# Patient Record
Sex: MALE | Race: WHITE | NOT HISPANIC OR LATINO | Employment: FULL TIME | URBAN - NONMETROPOLITAN AREA
[De-identification: names, ages, dates, MRNs, and addresses within clinical notes are randomized per-mention and may not be internally consistent; named-entity substitution may affect disease eponyms.]

---

## 2019-12-12 RX ORDER — NAPROXEN SODIUM 220 MG
220 TABLET ORAL DAILY
COMMUNITY
End: 2020-09-11 | Stop reason: HOSPADM

## 2019-12-12 RX ORDER — IRBESARTAN AND HYDROCHLOROTHIAZIDE 300; 12.5 MG/1; MG/1
1 TABLET, FILM COATED ORAL DAILY
COMMUNITY
End: 2020-09-11 | Stop reason: HOSPADM

## 2019-12-12 RX ORDER — AMLODIPINE BESYLATE 10 MG/1
10 TABLET ORAL NIGHTLY
COMMUNITY
End: 2020-09-11 | Stop reason: HOSPADM

## 2019-12-20 ENCOUNTER — HOSPITAL ENCOUNTER (OUTPATIENT)
Facility: HOSPITAL | Age: 59
Setting detail: HOSPITAL OUTPATIENT SURGERY
Discharge: HOME | End: 2019-12-20
Attending: INTERNAL MEDICINE | Admitting: INTERNAL MEDICINE

## 2019-12-20 ENCOUNTER — ANESTHESIA (OUTPATIENT)
Dept: GASTROENTEROLOGY | Facility: HOSPITAL | Age: 59
End: 2019-12-20

## 2019-12-20 ENCOUNTER — ANESTHESIA EVENT (OUTPATIENT)
Dept: GASTROENTEROLOGY | Facility: HOSPITAL | Age: 59
End: 2019-12-20

## 2019-12-20 VITALS
WEIGHT: 214 LBS | SYSTOLIC BLOOD PRESSURE: 126 MMHG | HEIGHT: 72 IN | OXYGEN SATURATION: 96 % | DIASTOLIC BLOOD PRESSURE: 78 MMHG | BODY MASS INDEX: 28.99 KG/M2 | TEMPERATURE: 97.8 F | HEART RATE: 91 BPM | RESPIRATION RATE: 20 BRPM

## 2019-12-20 DIAGNOSIS — R19.5 POSITIVE OCCULT STOOL BLOOD TEST: ICD-10-CM

## 2019-12-20 DIAGNOSIS — Z12.11 SCREEN FOR COLON CANCER: ICD-10-CM

## 2019-12-20 PROCEDURE — 25010000002 PROPOFOL 10 MG/ML EMULSION: Performed by: NURSE ANESTHETIST, CERTIFIED REGISTERED

## 2019-12-20 PROCEDURE — 88305 TISSUE EXAM BY PATHOLOGIST: CPT | Performed by: PATHOLOGY

## 2019-12-20 PROCEDURE — 88305 TISSUE EXAM BY PATHOLOGIST: CPT | Performed by: INTERNAL MEDICINE

## 2019-12-20 RX ORDER — PROPOFOL 10 MG/ML
VIAL (ML) INTRAVENOUS AS NEEDED
Status: DISCONTINUED | OUTPATIENT
Start: 2019-12-20 | End: 2019-12-20 | Stop reason: SURG

## 2019-12-20 RX ORDER — LIDOCAINE HYDROCHLORIDE 20 MG/ML
INJECTION, SOLUTION INTRAVENOUS AS NEEDED
Status: DISCONTINUED | OUTPATIENT
Start: 2019-12-20 | End: 2019-12-20 | Stop reason: SURG

## 2019-12-20 RX ORDER — DEXTROSE AND SODIUM CHLORIDE 5; .45 G/100ML; G/100ML
30 INJECTION, SOLUTION INTRAVENOUS CONTINUOUS PRN
Status: DISCONTINUED | OUTPATIENT
Start: 2019-12-20 | End: 2019-12-20 | Stop reason: HOSPADM

## 2019-12-20 RX ADMIN — PROPOFOL 20 MG: 10 INJECTION, EMULSION INTRAVENOUS at 14:28

## 2019-12-20 RX ADMIN — PROPOFOL 70 MG: 10 INJECTION, EMULSION INTRAVENOUS at 14:24

## 2019-12-20 RX ADMIN — DEXTROSE AND SODIUM CHLORIDE 30 ML/HR: 5; 450 INJECTION, SOLUTION INTRAVENOUS at 13:44

## 2019-12-20 RX ADMIN — PROPOFOL 20 MG: 10 INJECTION, EMULSION INTRAVENOUS at 14:29

## 2019-12-20 RX ADMIN — PROPOFOL 40 MG: 10 INJECTION, EMULSION INTRAVENOUS at 14:33

## 2019-12-20 RX ADMIN — PROPOFOL 20 MG: 10 INJECTION, EMULSION INTRAVENOUS at 14:38

## 2019-12-20 RX ADMIN — PROPOFOL 50 MG: 10 INJECTION, EMULSION INTRAVENOUS at 14:25

## 2019-12-20 RX ADMIN — LIDOCAINE HYDROCHLORIDE 60 MG: 20 INJECTION, SOLUTION INTRAVENOUS at 14:32

## 2019-12-20 RX ADMIN — PROPOFOL 20 MG: 10 INJECTION, EMULSION INTRAVENOUS at 14:27

## 2019-12-20 RX ADMIN — PROPOFOL 20 MG: 10 INJECTION, EMULSION INTRAVENOUS at 14:31

## 2019-12-20 RX ADMIN — PROPOFOL 20 MG: 10 INJECTION, EMULSION INTRAVENOUS at 14:32

## 2019-12-20 RX ADMIN — PROPOFOL 20 MG: 10 INJECTION, EMULSION INTRAVENOUS at 14:30

## 2019-12-20 RX ADMIN — PROPOFOL 20 MG: 10 INJECTION, EMULSION INTRAVENOUS at 14:35

## 2019-12-20 NOTE — ANESTHESIA POSTPROCEDURE EVALUATION
Patient: Mark Sharma    Procedure Summary     Date:  12/20/19 Room / Location:  [facility] ENDOSCOPY 2 / [facility] ENDOSCOPY    Anesthesia Start:  1419 Anesthesia Stop:  1445    Procedure:  COLONOSCOPY (N/A ) Diagnosis:       Screen for colon cancer      Positive occult stool blood test      (Screen for colon cancer [Z12.11])      (Positive occult stool blood test [R19.5])    Surgeon:  Dion Ambrocio DO Provider:  Collin Carter CRNA    Anesthesia Type:  MAC ASA Status:  2          Anesthesia Type: MAC    Vitals  No vitals data found for the desired time range.          Post Anesthesia Care and Evaluation    Patient location during evaluation: bedside  Patient participation: complete - patient participated  Level of consciousness: awake  Pain score: 0  Pain management: adequate  Airway patency: patent  Anesthetic complications: No anesthetic complications  PONV Status: none  Cardiovascular status: acceptable  Respiratory status: acceptable and spontaneous ventilation  Hydration status: acceptable

## 2019-12-20 NOTE — ANESTHESIA PREPROCEDURE EVALUATION
Anesthesia Evaluation     Patient summary reviewed   NPO Solid Status: > 8 hours  NPO Liquid Status: > 4 hours           Airway   Mallampati: II  TM distance: >3 FB  Neck ROM: full  No difficulty expected  Dental      Pulmonary - normal exam   Cardiovascular - normal exam    (+) hypertension,       Neuro/Psych  GI/Hepatic/Renal/Endo      Musculoskeletal     Abdominal    Substance History      OB/GYN          Other                        Anesthesia Plan    ASA 2     MAC     intravenous induction     Anesthetic plan, all risks, benefits, and alternatives have been provided, discussed and informed consent has been obtained with: patient.

## 2019-12-23 LAB
LAB AP CASE REPORT: NORMAL
PATH REPORT.FINAL DX SPEC: NORMAL
PATH REPORT.GROSS SPEC: NORMAL

## 2020-09-05 ENCOUNTER — APPOINTMENT (OUTPATIENT)
Dept: CARDIOLOGY | Facility: HOSPITAL | Age: 60
End: 2020-09-05

## 2020-09-05 ENCOUNTER — HOSPITAL ENCOUNTER (INPATIENT)
Facility: HOSPITAL | Age: 60
LOS: 6 days | Discharge: LEFT BEFORE DISCHARGE | End: 2020-09-11
Attending: FAMILY MEDICINE | Admitting: HOSPITALIST

## 2020-09-05 ENCOUNTER — APPOINTMENT (OUTPATIENT)
Dept: GENERAL RADIOLOGY | Facility: HOSPITAL | Age: 60
End: 2020-09-05

## 2020-09-05 DIAGNOSIS — J18.9 PNEUMONIA DUE TO ORGANISM: ICD-10-CM

## 2020-09-05 DIAGNOSIS — I35.0 AORTIC STENOSIS, SEVERE: ICD-10-CM

## 2020-09-05 DIAGNOSIS — Z74.09 IMPAIRED FUNCTIONAL MOBILITY, BALANCE, GAIT, AND ENDURANCE: ICD-10-CM

## 2020-09-05 DIAGNOSIS — J96.01 ACUTE RESPIRATORY FAILURE WITH HYPOXIA (HCC): Primary | ICD-10-CM

## 2020-09-05 DIAGNOSIS — Z78.9 IMPAIRED MOBILITY AND ADLS: ICD-10-CM

## 2020-09-05 DIAGNOSIS — Z74.09 IMPAIRED MOBILITY AND ADLS: ICD-10-CM

## 2020-09-05 DIAGNOSIS — J44.1 COPD EXACERBATION (HCC): ICD-10-CM

## 2020-09-05 DIAGNOSIS — R13.10 DYSPHAGIA, UNSPECIFIED TYPE: ICD-10-CM

## 2020-09-05 LAB
ALBUMIN SERPL-MCNC: 4.2 G/DL (ref 3.5–5.2)
ALBUMIN/GLOB SERPL: 1.8 G/DL
ALP SERPL-CCNC: 81 U/L (ref 39–117)
ALT SERPL W P-5'-P-CCNC: 25 U/L (ref 1–41)
ANION GAP SERPL CALCULATED.3IONS-SCNC: 12 MMOL/L (ref 5–15)
ARTERIAL PATENCY WRIST A: ABNORMAL
ARTERIAL PATENCY WRIST A: POSITIVE
AST SERPL-CCNC: 26 U/L (ref 1–40)
ATMOSPHERIC PRESS: 754 MMHG
ATMOSPHERIC PRESS: 755 MMHG
BACTERIA UR QL AUTO: ABNORMAL /HPF
BASE EXCESS BLDA CALC-SCNC: -6.7 MMOL/L (ref 0–2)
BASE EXCESS BLDA CALC-SCNC: -7.7 MMOL/L (ref 0–2)
BASOPHILS # BLD AUTO: 0.08 10*3/MM3 (ref 0–0.2)
BASOPHILS NFR BLD AUTO: 0.7 % (ref 0–1.5)
BDY SITE: ABNORMAL
BDY SITE: ABNORMAL
BH CV ECHO MEAS - AI DEC SLOPE: 116 CM/SEC^2
BH CV ECHO MEAS - AI MAX PG: 36.2 MMHG
BH CV ECHO MEAS - AI MAX VEL: 301 CM/SEC
BH CV ECHO MEAS - AI P1/2T: 760 MSEC
BH CV ECHO MEAS - AO MAX PG (FULL): 63.4 MMHG
BH CV ECHO MEAS - AO MAX PG: 66.3 MMHG
BH CV ECHO MEAS - AO MEAN PG (FULL): 39 MMHG
BH CV ECHO MEAS - AO MEAN PG: 41 MMHG
BH CV ECHO MEAS - AO V2 MAX: 407 CM/SEC
BH CV ECHO MEAS - AO V2 MEAN: 303 CM/SEC
BH CV ECHO MEAS - AO V2 VTI: 91.4 CM
BH CV ECHO MEAS - AVA(I,A): 0.74 CM^2
BH CV ECHO MEAS - AVA(I,D): 0.74 CM^2
BH CV ECHO MEAS - AVA(V,A): 0.72 CM^2
BH CV ECHO MEAS - AVA(V,D): 0.72 CM^2
BH CV ECHO MEAS - BSA(HAYCOCK): 2.2 M^2
BH CV ECHO MEAS - BSA: 2.1 M^2
BH CV ECHO MEAS - BZI_BMI: 27.3 KILOGRAMS/M^2
BH CV ECHO MEAS - BZI_METRIC_HEIGHT: 182.9 CM
BH CV ECHO MEAS - BZI_METRIC_WEIGHT: 91.2 KG
BH CV ECHO MEAS - EDV(CUBED): 142.2 ML
BH CV ECHO MEAS - EDV(MOD-SP2): 53.4 ML
BH CV ECHO MEAS - EDV(MOD-SP4): 112 ML
BH CV ECHO MEAS - EDV(TEICH): 130.7 ML
BH CV ECHO MEAS - EF(CUBED): 45.6 %
BH CV ECHO MEAS - EF(MOD-SP2): 22.3 %
BH CV ECHO MEAS - EF(MOD-SP4): 32.8 %
BH CV ECHO MEAS - EF(TEICH): 37.8 %
BH CV ECHO MEAS - ESV(CUBED): 77.3 ML
BH CV ECHO MEAS - ESV(MOD-SP2): 41.5 ML
BH CV ECHO MEAS - ESV(MOD-SP4): 75.3 ML
BH CV ECHO MEAS - ESV(TEICH): 81.3 ML
BH CV ECHO MEAS - FS: 18.4 %
BH CV ECHO MEAS - IVS/LVPW: 0.84
BH CV ECHO MEAS - IVSD: 1.2 CM
BH CV ECHO MEAS - LA DIMENSION: 3.2 CM
BH CV ECHO MEAS - LV DIASTOLIC VOL/BSA (35-75): 52.5 ML/M^2
BH CV ECHO MEAS - LV MASS(C)D: 287.8 GRAMS
BH CV ECHO MEAS - LV MASS(C)DI: 134.8 GRAMS/M^2
BH CV ECHO MEAS - LV MAX PG: 2.9 MMHG
BH CV ECHO MEAS - LV MEAN PG: 2 MMHG
BH CV ECHO MEAS - LV SYSTOLIC VOL/BSA (12-30): 35.3 ML/M^2
BH CV ECHO MEAS - LV V1 MAX: 84.9 CM/SEC
BH CV ECHO MEAS - LV V1 MEAN: 56.5 CM/SEC
BH CV ECHO MEAS - LV V1 VTI: 19.6 CM
BH CV ECHO MEAS - LVIDD: 5.2 CM
BH CV ECHO MEAS - LVIDS: 4.3 CM
BH CV ECHO MEAS - LVLD AP2: 8.2 CM
BH CV ECHO MEAS - LVLD AP4: 9.1 CM
BH CV ECHO MEAS - LVLS AP2: 8.4 CM
BH CV ECHO MEAS - LVLS AP4: 9.2 CM
BH CV ECHO MEAS - LVOT AREA (M): 3.5 CM^2
BH CV ECHO MEAS - LVOT AREA: 3.5 CM^2
BH CV ECHO MEAS - LVOT DIAM: 2.1 CM
BH CV ECHO MEAS - LVPWD: 1.4 CM
BH CV ECHO MEAS - MV A MAX VEL: 115 CM/SEC
BH CV ECHO MEAS - MV DEC SLOPE: 530 CM/SEC^2
BH CV ECHO MEAS - MV E MAX VEL: 77.6 CM/SEC
BH CV ECHO MEAS - MV E/A: 0.67
BH CV ECHO MEAS - MV P1/2T MAX VEL: 88 CM/SEC
BH CV ECHO MEAS - MV P1/2T: 48.6 MSEC
BH CV ECHO MEAS - MVA P1/2T LCG: 2.5 CM^2
BH CV ECHO MEAS - MVA(P1/2T): 4.5 CM^2
BH CV ECHO MEAS - PA MAX PG (FULL): 0.56 MMHG
BH CV ECHO MEAS - PA MAX PG: 3.3 MMHG
BH CV ECHO MEAS - PA V2 MAX: 90.9 CM/SEC
BH CV ECHO MEAS - RAP SYSTOLE: 10 MMHG
BH CV ECHO MEAS - RV MAX PG: 2.7 MMHG
BH CV ECHO MEAS - RV MEAN PG: 2 MMHG
BH CV ECHO MEAS - RV V1 MAX: 82.9 CM/SEC
BH CV ECHO MEAS - RV V1 MEAN: 60 CM/SEC
BH CV ECHO MEAS - RV V1 VTI: 17 CM
BH CV ECHO MEAS - RVDD: 2.5 CM
BH CV ECHO MEAS - RVSP: 37.2 MMHG
BH CV ECHO MEAS - SI(CUBED): 30.4 ML/M^2
BH CV ECHO MEAS - SI(LVOT): 31.8 ML/M^2
BH CV ECHO MEAS - SI(MOD-SP2): 5.6 ML/M^2
BH CV ECHO MEAS - SI(MOD-SP4): 17.2 ML/M^2
BH CV ECHO MEAS - SI(TEICH): 23.1 ML/M^2
BH CV ECHO MEAS - SV(CUBED): 64.9 ML
BH CV ECHO MEAS - SV(LVOT): 67.9 ML
BH CV ECHO MEAS - SV(MOD-SP2): 11.9 ML
BH CV ECHO MEAS - SV(MOD-SP4): 36.7 ML
BH CV ECHO MEAS - SV(TEICH): 49.4 ML
BH CV ECHO MEAS - TR MAX VEL: 261 CM/SEC
BILIRUB SERPL-MCNC: 1.5 MG/DL (ref 0–1.2)
BILIRUB UR QL STRIP: NEGATIVE
BUN SERPL-MCNC: 12 MG/DL (ref 6–20)
BUN/CREAT SERPL: 11.3 (ref 7–25)
CALCIUM SPEC-SCNC: 8.6 MG/DL (ref 8.6–10.5)
CHLORIDE SERPL-SCNC: 103 MMOL/L (ref 98–107)
CLARITY UR: CLEAR
CO2 SERPL-SCNC: 25 MMOL/L (ref 22–29)
COLOR UR: YELLOW
CREAT SERPL-MCNC: 1.06 MG/DL (ref 0.76–1.27)
D-LACTATE SERPL-SCNC: 1 MMOL/L (ref 0.5–2)
D-LACTATE SERPL-SCNC: 2.2 MMOL/L (ref 0.5–2)
DEPRECATED RDW RBC AUTO: 43.8 FL (ref 37–54)
EOSINOPHIL # BLD AUTO: 0.19 10*3/MM3 (ref 0–0.4)
EOSINOPHIL NFR BLD AUTO: 1.5 % (ref 0.3–6.2)
ERYTHROCYTE [DISTWIDTH] IN BLOOD BY AUTOMATED COUNT: 12.4 % (ref 12.3–15.4)
GAS FLOW AIRWAY: 15 LPM
GFR SERPL CREATININE-BSD FRML MDRD: 72 ML/MIN/1.73
GLOBULIN UR ELPH-MCNC: 2.4 GM/DL
GLUCOSE SERPL-MCNC: 191 MG/DL (ref 65–99)
GLUCOSE UR STRIP-MCNC: NEGATIVE MG/DL
HBA1C MFR BLD: 5.3 % (ref 4.8–5.6)
HCO3 BLDA-SCNC: 21 MMOL/L (ref 20–26)
HCO3 BLDA-SCNC: 21.2 MMOL/L (ref 20–26)
HCT VFR BLD AUTO: 45.5 % (ref 37.5–51)
HGB BLD-MCNC: 16 G/DL (ref 13–17.7)
HGB UR QL STRIP.AUTO: ABNORMAL
HOLD SPECIMEN: NORMAL
HOLD SPECIMEN: NORMAL
HYALINE CASTS UR QL AUTO: ABNORMAL /LPF
IMM GRANULOCYTES # BLD AUTO: 0.04 10*3/MM3 (ref 0–0.05)
IMM GRANULOCYTES NFR BLD AUTO: 0.3 % (ref 0–0.5)
INHALED O2 CONCENTRATION: 100 %
INHALED O2 CONCENTRATION: 100 %
KETONES UR QL STRIP: ABNORMAL
LACTATE HOLD SPECIMEN: NORMAL
LEUKOCYTE ESTERASE UR QL STRIP.AUTO: NEGATIVE
LYMPHOCYTES # BLD AUTO: 3.26 10*3/MM3 (ref 0.7–3.1)
LYMPHOCYTES NFR BLD AUTO: 26.5 % (ref 19.6–45.3)
Lab: ABNORMAL
Lab: ABNORMAL
MAXIMAL PREDICTED HEART RATE: 161 BPM
MCH RBC QN AUTO: 33.4 PG (ref 26.6–33)
MCHC RBC AUTO-ENTMCNC: 35.2 G/DL (ref 31.5–35.7)
MCV RBC AUTO: 95 FL (ref 79–97)
MODALITY: ABNORMAL
MODALITY: ABNORMAL
MONOCYTES # BLD AUTO: 0.82 10*3/MM3 (ref 0.1–0.9)
MONOCYTES NFR BLD AUTO: 6.7 % (ref 5–12)
NEUTROPHILS NFR BLD AUTO: 64.3 % (ref 42.7–76)
NEUTROPHILS NFR BLD AUTO: 7.9 10*3/MM3 (ref 1.7–7)
NITRITE UR QL STRIP: NEGATIVE
NRBC BLD AUTO-RTO: 0 /100 WBC (ref 0–0.2)
NT-PROBNP SERPL-MCNC: 3740 PG/ML (ref 0–900)
PCO2 BLDA: 50.6 MM HG (ref 35–45)
PCO2 BLDA: 53.7 MM HG (ref 35–45)
PEEP RESPIRATORY: 5 CM[H2O]
PH BLDA: 7.2 PH UNITS (ref 7.35–7.45)
PH BLDA: 7.23 PH UNITS (ref 7.35–7.45)
PH UR STRIP.AUTO: 5.5 [PH] (ref 5–9)
PLATELET # BLD AUTO: 256 10*3/MM3 (ref 140–450)
PMV BLD AUTO: 11.6 FL (ref 6–12)
PO2 BLDA: 64.6 MM HG (ref 83–108)
PO2 BLDA: 95.1 MM HG (ref 83–108)
POTASSIUM SERPL-SCNC: 3.4 MMOL/L (ref 3.5–5.2)
PROT SERPL-MCNC: 6.6 G/DL (ref 6–8.5)
PROT UR QL STRIP: NEGATIVE
RBC # BLD AUTO: 4.79 10*6/MM3 (ref 4.14–5.8)
RBC # UR: ABNORMAL /HPF
REF LAB TEST METHOD: ABNORMAL
SAO2 % BLDCOA: 86.6 % (ref 94–99)
SAO2 % BLDCOA: 95.9 % (ref 94–99)
SARS-COV-2 N GENE RESP QL NAA+PROBE: NOT DETECTED
SET MECH RESP RATE: 20
SODIUM SERPL-SCNC: 140 MMOL/L (ref 136–145)
SP GR UR STRIP: 1.02 (ref 1–1.03)
SQUAMOUS #/AREA URNS HPF: ABNORMAL /HPF
STRESS TARGET HR: 137 BPM
TROPONIN T SERPL-MCNC: 0.12 NG/ML (ref 0–0.03)
TROPONIN T SERPL-MCNC: 0.22 NG/ML (ref 0–0.03)
TROPONIN T SERPL-MCNC: <0.01 NG/ML (ref 0–0.03)
UROBILINOGEN UR QL STRIP: ABNORMAL
VENTILATOR MODE: ABNORMAL
VENTILATOR MODE: AC
VT ON VENT VENT: 550 ML
WBC # BLD AUTO: 12.29 10*3/MM3 (ref 3.4–10.8)
WBC UR QL AUTO: ABNORMAL /HPF
WHOLE BLOOD HOLD SPECIMEN: NORMAL

## 2020-09-05 PROCEDURE — 94799 UNLISTED PULMONARY SVC/PX: CPT

## 2020-09-05 PROCEDURE — 25010000002 FUROSEMIDE PER 20 MG: Performed by: FAMILY MEDICINE

## 2020-09-05 PROCEDURE — 0BH17EZ INSERTION OF ENDOTRACHEAL AIRWAY INTO TRACHEA, VIA NATURAL OR ARTIFICIAL OPENING: ICD-10-PCS | Performed by: FAMILY MEDICINE

## 2020-09-05 PROCEDURE — 25010000002 CEFTRIAXONE: Performed by: FAMILY MEDICINE

## 2020-09-05 PROCEDURE — 94002 VENT MGMT INPAT INIT DAY: CPT

## 2020-09-05 PROCEDURE — 25010000002 METHYLPREDNISOLONE PER 125 MG: Performed by: FAMILY MEDICINE

## 2020-09-05 PROCEDURE — 25010000002 FUROSEMIDE PER 20 MG: Performed by: HOSPITALIST

## 2020-09-05 PROCEDURE — 25010000002 MIDAZOLAM PER 1 MG: Performed by: FAMILY MEDICINE

## 2020-09-05 PROCEDURE — 5A1945Z RESPIRATORY VENTILATION, 24-96 CONSECUTIVE HOURS: ICD-10-PCS | Performed by: INTERNAL MEDICINE

## 2020-09-05 PROCEDURE — 87635 SARS-COV-2 COVID-19 AMP PRB: CPT | Performed by: FAMILY MEDICINE

## 2020-09-05 PROCEDURE — 93306 TTE W/DOPPLER COMPLETE: CPT

## 2020-09-05 PROCEDURE — 87040 BLOOD CULTURE FOR BACTERIA: CPT | Performed by: FAMILY MEDICINE

## 2020-09-05 PROCEDURE — 84484 ASSAY OF TROPONIN QUANT: CPT | Performed by: INTERNAL MEDICINE

## 2020-09-05 PROCEDURE — 25010000002 MIDAZOLAM PER 1 MG

## 2020-09-05 PROCEDURE — 31500 INSERT EMERGENCY AIRWAY: CPT

## 2020-09-05 PROCEDURE — 93010 ELECTROCARDIOGRAM REPORT: CPT | Performed by: INTERNAL MEDICINE

## 2020-09-05 PROCEDURE — 87070 CULTURE OTHR SPECIMN AEROBIC: CPT | Performed by: HOSPITALIST

## 2020-09-05 PROCEDURE — 94760 N-INVAS EAR/PLS OXIMETRY 1: CPT

## 2020-09-05 PROCEDURE — 81001 URINALYSIS AUTO W/SCOPE: CPT | Performed by: HOSPITALIST

## 2020-09-05 PROCEDURE — 25010000002 ENOXAPARIN PER 10 MG: Performed by: INTERNAL MEDICINE

## 2020-09-05 PROCEDURE — 83605 ASSAY OF LACTIC ACID: CPT | Performed by: FAMILY MEDICINE

## 2020-09-05 PROCEDURE — 71045 X-RAY EXAM CHEST 1 VIEW: CPT

## 2020-09-05 PROCEDURE — 82803 BLOOD GASES ANY COMBINATION: CPT

## 2020-09-05 PROCEDURE — 25010000002 MIDAZOLAM 50 MG/10ML SOLUTION: Performed by: INTERNAL MEDICINE

## 2020-09-05 PROCEDURE — 25010000002 ONDANSETRON PER 1 MG

## 2020-09-05 PROCEDURE — 93306 TTE W/DOPPLER COMPLETE: CPT | Performed by: INTERNAL MEDICINE

## 2020-09-05 PROCEDURE — 93005 ELECTROCARDIOGRAM TRACING: CPT | Performed by: FAMILY MEDICINE

## 2020-09-05 PROCEDURE — 85025 COMPLETE CBC W/AUTO DIFF WBC: CPT | Performed by: FAMILY MEDICINE

## 2020-09-05 PROCEDURE — 84484 ASSAY OF TROPONIN QUANT: CPT | Performed by: FAMILY MEDICINE

## 2020-09-05 PROCEDURE — C9803 HOPD COVID-19 SPEC COLLECT: HCPCS

## 2020-09-05 PROCEDURE — 36600 WITHDRAWAL OF ARTERIAL BLOOD: CPT

## 2020-09-05 PROCEDURE — 25010000002 PROPOFOL 10 MG/ML EMULSION: Performed by: FAMILY MEDICINE

## 2020-09-05 PROCEDURE — 87205 SMEAR GRAM STAIN: CPT | Performed by: HOSPITALIST

## 2020-09-05 PROCEDURE — 83036 HEMOGLOBIN GLYCOSYLATED A1C: CPT | Performed by: INTERNAL MEDICINE

## 2020-09-05 PROCEDURE — 80053 COMPREHEN METABOLIC PANEL: CPT | Performed by: FAMILY MEDICINE

## 2020-09-05 PROCEDURE — 99291 CRITICAL CARE FIRST HOUR: CPT

## 2020-09-05 PROCEDURE — 94640 AIRWAY INHALATION TREATMENT: CPT

## 2020-09-05 PROCEDURE — 84484 ASSAY OF TROPONIN QUANT: CPT | Performed by: HOSPITALIST

## 2020-09-05 PROCEDURE — 83880 ASSAY OF NATRIURETIC PEPTIDE: CPT | Performed by: FAMILY MEDICINE

## 2020-09-05 PROCEDURE — 25010000002 SUCCINYLCHOLINE PER 20 MG: Performed by: FAMILY MEDICINE

## 2020-09-05 PROCEDURE — 25010000002 AZITHROMYCIN 500 MG/250 ML: Performed by: FAMILY MEDICINE

## 2020-09-05 RX ORDER — ALBUTEROL SULFATE 90 UG/1
2 AEROSOL, METERED RESPIRATORY (INHALATION)
Status: DISCONTINUED | OUTPATIENT
Start: 2020-09-05 | End: 2020-09-08

## 2020-09-05 RX ORDER — FUROSEMIDE 10 MG/ML
40 INJECTION INTRAMUSCULAR; INTRAVENOUS ONCE
Status: COMPLETED | OUTPATIENT
Start: 2020-09-05 | End: 2020-09-05

## 2020-09-05 RX ORDER — ONDANSETRON 2 MG/ML
INJECTION INTRAMUSCULAR; INTRAVENOUS
Status: COMPLETED
Start: 2020-09-05 | End: 2020-09-05

## 2020-09-05 RX ORDER — SODIUM CHLORIDE, SODIUM LACTATE, POTASSIUM CHLORIDE, CALCIUM CHLORIDE 600; 310; 30; 20 MG/100ML; MG/100ML; MG/100ML; MG/100ML
30 INJECTION, SOLUTION INTRAVENOUS CONTINUOUS
Status: DISCONTINUED | OUTPATIENT
Start: 2020-09-05 | End: 2020-09-09

## 2020-09-05 RX ORDER — ALBUTEROL SULFATE 2.5 MG/3ML
7.5 SOLUTION RESPIRATORY (INHALATION) CONTINUOUS
Status: DISCONTINUED | OUTPATIENT
Start: 2020-09-05 | End: 2020-09-05

## 2020-09-05 RX ORDER — SODIUM CHLORIDE 0.9 % (FLUSH) 0.9 %
10 SYRINGE (ML) INJECTION AS NEEDED
Status: DISCONTINUED | OUTPATIENT
Start: 2020-09-05 | End: 2020-09-11 | Stop reason: HOSPADM

## 2020-09-05 RX ORDER — IPRATROPIUM BROMIDE AND ALBUTEROL SULFATE 2.5; .5 MG/3ML; MG/3ML
SOLUTION RESPIRATORY (INHALATION)
Status: COMPLETED
Start: 2020-09-05 | End: 2020-09-05

## 2020-09-05 RX ORDER — PANTOPRAZOLE SODIUM 40 MG/1
40 TABLET, DELAYED RELEASE ORAL DAILY
COMMUNITY

## 2020-09-05 RX ORDER — FAMOTIDINE 10 MG/ML
20 INJECTION, SOLUTION INTRAVENOUS EVERY 12 HOURS SCHEDULED
Status: DISCONTINUED | OUTPATIENT
Start: 2020-09-05 | End: 2020-09-11 | Stop reason: HOSPADM

## 2020-09-05 RX ORDER — MIDAZOLAM HYDROCHLORIDE 1 MG/ML
INJECTION INTRAMUSCULAR; INTRAVENOUS
Status: COMPLETED
Start: 2020-09-05 | End: 2020-09-05

## 2020-09-05 RX ORDER — IPRATROPIUM BROMIDE AND ALBUTEROL SULFATE 2.5; .5 MG/3ML; MG/3ML
3 SOLUTION RESPIRATORY (INHALATION) ONCE
Status: DISCONTINUED | OUTPATIENT
Start: 2020-09-05 | End: 2020-09-05

## 2020-09-05 RX ORDER — ONDANSETRON 2 MG/ML
4 INJECTION INTRAMUSCULAR; INTRAVENOUS ONCE
Status: COMPLETED | OUTPATIENT
Start: 2020-09-05 | End: 2020-09-05

## 2020-09-05 RX ORDER — SUCCINYLCHOLINE CHLORIDE 20 MG/ML
100 INJECTION INTRAMUSCULAR; INTRAVENOUS ONCE
Status: COMPLETED | OUTPATIENT
Start: 2020-09-05 | End: 2020-09-05

## 2020-09-05 RX ORDER — METHYLPREDNISOLONE SODIUM SUCCINATE 125 MG/2ML
125 INJECTION, POWDER, LYOPHILIZED, FOR SOLUTION INTRAMUSCULAR; INTRAVENOUS ONCE
Status: COMPLETED | OUTPATIENT
Start: 2020-09-05 | End: 2020-09-05

## 2020-09-05 RX ORDER — NOREPINEPHRINE BIT/0.9 % NACL 8 MG/250ML
.02-.3 INFUSION BOTTLE (ML) INTRAVENOUS
Status: DISCONTINUED | OUTPATIENT
Start: 2020-09-05 | End: 2020-09-07

## 2020-09-05 RX ORDER — MIDAZOLAM HYDROCHLORIDE 1 MG/ML
2 INJECTION INTRAMUSCULAR; INTRAVENOUS ONCE
Status: COMPLETED | OUTPATIENT
Start: 2020-09-05 | End: 2020-09-05

## 2020-09-05 RX ORDER — FUROSEMIDE 10 MG/ML
80 INJECTION INTRAMUSCULAR; INTRAVENOUS ONCE
Status: DISCONTINUED | OUTPATIENT
Start: 2020-09-05 | End: 2020-09-06

## 2020-09-05 RX ORDER — IPRATROPIUM BROMIDE AND ALBUTEROL SULFATE 2.5; .5 MG/3ML; MG/3ML
3 SOLUTION RESPIRATORY (INHALATION)
Status: DISCONTINUED | OUTPATIENT
Start: 2020-09-05 | End: 2020-09-05

## 2020-09-05 RX ORDER — SODIUM CHLORIDE 0.9 % (FLUSH) 0.9 %
10 SYRINGE (ML) INJECTION EVERY 12 HOURS SCHEDULED
Status: DISCONTINUED | OUTPATIENT
Start: 2020-09-05 | End: 2020-09-11 | Stop reason: HOSPADM

## 2020-09-05 RX ORDER — ETOMIDATE 2 MG/ML
20 INJECTION INTRAVENOUS ONCE
Status: COMPLETED | OUTPATIENT
Start: 2020-09-05 | End: 2020-09-05

## 2020-09-05 RX ADMIN — ALBUTEROL SULFATE 2 PUFF: 90 AEROSOL, METERED RESPIRATORY (INHALATION) at 18:57

## 2020-09-05 RX ADMIN — MIDAZOLAM HYDROCHLORIDE 7 MG/HR: 1 INJECTION, SOLUTION INTRAMUSCULAR; INTRAVENOUS at 15:00

## 2020-09-05 RX ADMIN — ONDANSETRON 4 MG: 2 INJECTION INTRAMUSCULAR; INTRAVENOUS at 04:50

## 2020-09-05 RX ADMIN — FAMOTIDINE 20 MG: 10 INJECTION INTRAVENOUS at 20:39

## 2020-09-05 RX ADMIN — ETOMIDATE 20 MG: 2 INJECTION, SOLUTION INTRAVENOUS at 04:49

## 2020-09-05 RX ADMIN — MIDAZOLAM HYDROCHLORIDE 2 MG: 2 INJECTION, SOLUTION INTRAMUSCULAR; INTRAVENOUS at 05:52

## 2020-09-05 RX ADMIN — ENOXAPARIN SODIUM 40 MG: 40 INJECTION SUBCUTANEOUS at 09:58

## 2020-09-05 RX ADMIN — MIDAZOLAM HYDROCHLORIDE 2 MG: 1 INJECTION INTRAMUSCULAR; INTRAVENOUS at 07:28

## 2020-09-05 RX ADMIN — AZITHROMYCIN 500 MG: 500 INJECTION, POWDER, LYOPHILIZED, FOR SOLUTION INTRAVENOUS at 06:20

## 2020-09-05 RX ADMIN — SODIUM CHLORIDE, PRESERVATIVE FREE 10 ML: 5 INJECTION INTRAVENOUS at 22:06

## 2020-09-05 RX ADMIN — IPRATROPIUM BROMIDE 2 PUFF: 17 AEROSOL, METERED RESPIRATORY (INHALATION) at 18:57

## 2020-09-05 RX ADMIN — MIDAZOLAM HYDROCHLORIDE 2 MG: 2 INJECTION, SOLUTION INTRAMUSCULAR; INTRAVENOUS at 07:28

## 2020-09-05 RX ADMIN — SODIUM CHLORIDE, POTASSIUM CHLORIDE, SODIUM LACTATE AND CALCIUM CHLORIDE 100 ML/HR: 600; 310; 30; 20 INJECTION, SOLUTION INTRAVENOUS at 17:08

## 2020-09-05 RX ADMIN — MIDAZOLAM HYDROCHLORIDE 10 MG/HR: 1 INJECTION, SOLUTION INTRAMUSCULAR; INTRAVENOUS at 20:35

## 2020-09-05 RX ADMIN — MIDAZOLAM HYDROCHLORIDE 1 MG/HR: 1 INJECTION, SOLUTION INTRAMUSCULAR; INTRAVENOUS at 08:00

## 2020-09-05 RX ADMIN — IPRATROPIUM BROMIDE AND ALBUTEROL SULFATE 3 ML: 2.5; .5 SOLUTION RESPIRATORY (INHALATION) at 04:50

## 2020-09-05 RX ADMIN — FUROSEMIDE 40 MG: 10 INJECTION, SOLUTION INTRAMUSCULAR; INTRAVENOUS at 07:31

## 2020-09-05 RX ADMIN — CEFTRIAXONE 1 G: 1 INJECTION, POWDER, FOR SOLUTION INTRAMUSCULAR; INTRAVENOUS at 05:53

## 2020-09-05 RX ADMIN — SODIUM CHLORIDE, POTASSIUM CHLORIDE, SODIUM LACTATE AND CALCIUM CHLORIDE 100 ML/HR: 600; 310; 30; 20 INJECTION, SOLUTION INTRAVENOUS at 08:12

## 2020-09-05 RX ADMIN — SUCCINYLCHOLINE CHLORIDE 100 MG: 20 INJECTION, SOLUTION INTRAMUSCULAR; INTRAVENOUS at 04:50

## 2020-09-05 RX ADMIN — PROPOFOL 40 MCG/KG/MIN: 10 INJECTION, EMULSION INTRAVENOUS at 04:58

## 2020-09-05 RX ADMIN — SODIUM CHLORIDE 1000 ML: 900 INJECTION, SOLUTION INTRAVENOUS at 05:30

## 2020-09-05 RX ADMIN — FUROSEMIDE 40 MG: 10 INJECTION, SOLUTION INTRAMUSCULAR; INTRAVENOUS at 09:58

## 2020-09-05 RX ADMIN — Medication 0.26 MCG/KG/MIN: at 07:29

## 2020-09-05 RX ADMIN — METHYLPREDNISOLONE SODIUM SUCCINATE 125 MG: 125 INJECTION, POWDER, FOR SOLUTION INTRAMUSCULAR; INTRAVENOUS at 04:40

## 2020-09-05 NOTE — NURSING NOTE
Spoke with next of kin, the PT's son, Kamran. Kamran stated that he lives in [place], but the patient wanted his brother Ciaran to make medical decisions for him and he wishes to give consent for Ciaran to be our person of contact.. Spoke with the patient's sister and s/o who all agreed that the patient wanted Ciaran to make medical decisions for him. Ciaran stated they agreed to make him the medical POA and were in the process of paperwork, but have not completed it at this time.

## 2020-09-05 NOTE — ED PROVIDER NOTES
"Subjective     History provided by:  Patient   used: No    Patient is a 59 years old male with past medical history of hypertension, COPD who presented here today because of respiratory distress.  Patient said that he started he started about 2 weeks ago and progressively getting worse.  Denies any fever chills or sweating.  Denies any change in past.  Smokes about 1 pack a day for years and then stopped about a month ago.    Review of Systems   Respiratory: Positive for shortness of breath.    All other systems reviewed and are negative.      Past Medical History:   Diagnosis Date   • Hypertension        Allergies   Allergen Reactions   • Metoprolol Rash       Past Surgical History:   Procedure Laterality Date   • APPENDECTOMY     • COLONOSCOPY N/A 12/20/2019    Procedure: COLONOSCOPY;  Surgeon: Dion Ambrocio DO;  Location: [facility] ENDOSCOPY;  Service: Gastroenterology       No family history on file.    Social History     Socioeconomic History   • Marital status: Single     Spouse name: Not on file   • Number of children: Not on file   • Years of education: Not on file   • Highest education level: Not on file   Tobacco Use   • Smoking status: Former Smoker     Years: 2.00     Types: Cigars   • Smokeless tobacco: Never Used   Substance and Sexual Activity   • Alcohol use: Yes     Alcohol/week: 2.0 standard drinks     Types: 2 Cans of beer per week     Comment: 2 beer/wk maybe 6-8 on wknd   • Drug use: Not Currently   • Sexual activity: Defer         BP (!) 88/63   Pulse 108   Resp (!) (P) 32   Ht 182.9 cm (72\")   Wt 90.7 kg (200 lb)   SpO2 94%   BMI 27.12 kg/m²   Objective   Physical Exam   Constitutional: He is oriented to person, place, and time. He appears toxic. He appears distressed.   HENT:   Head: Normocephalic and atraumatic.   Mouth/Throat: Oropharynx is clear and moist.   Eyes: Pupils are equal, round, and reactive to light. EOM are normal.   Neck: Normal range of motion. " Neck supple.   Cardiovascular: Regular rhythm, normal heart sounds, intact distal pulses and normal pulses. Tachycardia present.   Pulmonary/Chest: Accessory muscle usage present. He is in respiratory distress. He has wheezes.   Abdominal: Soft. Bowel sounds are normal.   Musculoskeletal: Normal range of motion.        Right lower leg: Normal.        Left lower leg: Normal.   Neurological: He is alert and oriented to person, place, and time.   Skin: Skin is warm. Capillary refill takes less than 2 seconds.   Nursing note and vitals reviewed.      Intubation  Date/Time: 9/5/2020 5:56 AM  Performed by: Vic Rodriguez MD  Authorized by: Vic Rodriguez MD     Consent:     Consent obtained:  Verbal    Consent given by:  Patient    Risks discussed:  Aspiration, brain injury, dental trauma, laryngeal injury, hypoxia, death, pneumothorax and bleeding    Alternatives discussed:  Alternative treatment  Pre-procedure details:     Patient status:  Awake    Mallampati score:  I    Pretreatment medications:  None  Procedure details:     Preoxygenation:  Nonrebreather mask    CPR in progress: no      Intubation method:  Oral    Oral intubation technique:  Video-assisted    Laryngoscope blade:  Mac 3    Tube size (mm):  7.0    Tube type:  Cuffed    Number of attempts:  1    Ventilation between attempts: no      Cricoid pressure: no      Tube visualized through cords: yes    Placement assessment:     ETT to lip:  22    ETT to teeth:  21    Breath sounds:  Equal    Placement verification: chest rise, CXR verification, direct visualization and ETCO2 detector      CXR findings:  ETT in proper place  Post-procedure details:     Patient tolerance of procedure:  Tolerated well, no immediate complications               ED Course  ED Course as of Sep 05 0632   Sat Sep 05, 2020   0630 Patient blood pressure was dropping patient was started on Levophed.  And patient is on propofol for sedation.  She is history of a history of  possibly pneumonia patient was started on antibiotic Zithromax and Rocephin.Dr. Osborn was called who accepted patient.    [MO]      ED Course User Index  [MO] Vic Rodriguez MD      Labs Reviewed   COMPREHENSIVE METABOLIC PANEL - Abnormal; Notable for the following components:       Result Value    Glucose 191 (*)     Potassium 3.4 (*)     Total Bilirubin 1.5 (*)     All other components within normal limits    Narrative:     GFR Normal >60  Chronic Kidney Disease <60  Kidney Failure <15     BNP (IN-HOUSE) - Abnormal; Notable for the following components:    proBNP 3,740.0 (*)     All other components within normal limits    Narrative:     Among patients with dyspnea, NT-proBNP is highly sensitive for the detection of acute congestive heart failure. In addition NT-proBNP of <300 pg/ml effectively rules out acute congestive heart failure with 99% negative predictive value.    Results may be falsely decreased if patient taking Biotin.     CBC WITH AUTO DIFFERENTIAL - Abnormal; Notable for the following components:    WBC 12.29 (*)     MCH 33.4 (*)     Neutrophils, Absolute 7.90 (*)     Lymphocytes, Absolute 3.26 (*)     All other components within normal limits   BLOOD GAS, ARTERIAL - Abnormal; Notable for the following components:    pH, Arterial 7.200 (*)     pCO2, Arterial 53.7 (*)     pO2, Arterial 64.6 (*)     Base Excess, Arterial -7.7 (*)     O2 Saturation, Arterial 86.6 (*)     All other components within normal limits   LACTIC ACID, PLASMA - Abnormal; Notable for the following components:    Lactate 2.2 (*)     All other components within normal limits   BLOOD GAS, ARTERIAL - Abnormal; Notable for the following components:    pH, Arterial 7.231 (*)     pCO2, Arterial 50.6 (*)     Base Excess, Arterial -6.7 (*)     All other components within normal limits   TROPONIN (IN-HOUSE) - Normal    Narrative:     Troponin T Reference Range:  <= 0.03 ng/mL-   Negative for AMI  >0.03 ng/mL-     Abnormal for  myocardial necrosis.  Clinicians would have to utilize clinical acumen, EKG, Troponin and serial changes to determine if it is an Acute Myocardial Infarction or myocardial injury due to an underlying chronic condition.       Results may be falsely decreased if patient taking Biotin.     BLOOD CULTURE   BLOOD CULTURE   RAINBOW DRAW    Narrative:     The following orders were created for panel order [place] Draw.  Procedure                               Abnormality         Status                     ---------                               -----------         ------                     Light Blue Top[[id number]]                                   Final result               Green Top (Gel)[[id number]]                                  Final result               Lavender Top[[id number]]                                     Final result               Gold Top - SST[[id number]]                                   Final result                 Please view results for these tests on the individual orders.   BLOOD GAS, ARTERIAL   LACTIC ACID REFLEX TIMER   CBC AND DIFFERENTIAL    Narrative:     The following orders were created for panel order CBC & Differential.  Procedure                               Abnormality         Status                     ---------                               -----------         ------                     CBC Auto Differential[[id number]]        Abnormal            Final result                 Please view results for these tests on the individual orders.   LIGHT BLUE TOP   GREEN TOP   LAVENDER TOP   GOLD TOP - SST       XR Chest 1 View   Final Result   Extensive pulmonary opacities likely edema and   effusions         Electronically signed by:  Wally Orona MD  9/5/2020 5:32 AM CDT   Workstation: [id number]                                           [facility]    Final diagnoses:   Acute respiratory failure with hypoxia (CMS/HCC)   Pneumonia due to organism   COPD exacerbation (CMS/HCC)            Vic Rodriguez  MD Sung  09/05/20 0632

## 2020-09-05 NOTE — PROGRESS NOTES
.      [facility]  INPATIENT PROGRESS NOTE    Length of Stay: 0  Date of Admission: 9/5/2020  Primary Care Physician: Tyler Singh MD    Subjective     Review of chart taking over from Night shift Physician    HPI:*Briefly this is a 59-year-old gentleman who presented the ED carrying the following problem list     1.  Hypertension  2.  History of BPH  3.  History of appendectomy  4.  History of tobacco use predominately a cigar exposure no history of cigarette use     Patient presents with a week history of progressive shortness of breath.  Of note patient was seen by his PCP on 8/22 complaining of 2-week history of epigastric pain.  Regardless patient brought in with increased respiratory rate and oxygen urgently intubated.  History is obtained from ED physician.  Patient denied any history of fevers, chills cough.  Denies any viral type symptomatology.  Patient's chest x-ray with bilateral process.  His white count was mildly elevated he had no fever although the patient post intubation is requiring vasopressors.  Of note he does have some EKG changes inferiorly bilaterally with initial troponin normal.    Subjective: Patient seen and evaluated on rounds. Chart reviewed. Sedated on on vent. Discussed case with nurse    Review of Systems   Unable to obtain due to sedated on vent    Objective    Temp:  [99.1 °F (37.3 °C)] 99.1 °F (37.3 °C)  Heart Rate:  [] 80  Resp:  [26-32] 32  BP: ()/() 94/62  FiO2 (%):  [100 %] 100 %    Physical Exam   Constitutional: He appears well-developed and well-nourished.   HENT:   Head: Atraumatic.   Right Ear: External ear normal.   Left Ear: External ear normal.   Nose: Nose normal.   Neck: No JVD present.   Cardiovascular: Regular rhythm, normal heart sounds and intact distal pulses.   tachycardia   Pulmonary/Chest:   Decreased throughout, rhonchi noted, no wheezes no rales   Abdominal: Soft. Bowel sounds  are normal.   Musculoskeletal: He exhibits no edema or deformity.   Neurological:   Sedated on the vent   Skin: Skin is warm and dry.   Psychiatric:   Unable to assess sedated on the vent   Nursing note and vitals reviewed.          Results Review:  I have reviewed the labs, radiology results, and diagnostic studies.    Laboratory Data:   Results from last 7 days   Lab Units 09/05/20  0437   SODIUM mmol/L 140   POTASSIUM mmol/L 3.4*   CHLORIDE mmol/L 103   CO2 mmol/L 25.0   BUN mg/dL 12   CREATININE mg/dL 1.06   GLUCOSE mg/dL 191*   CALCIUM mg/dL 8.6   BILIRUBIN mg/dL 1.5*   ALK PHOS U/L 81   ALT (SGPT) U/L 25   AST (SGOT) U/L 26   ANION GAP mmol/L 12.0     Estimated Creatinine Clearance: 96.9 mL/min (by C-G formula based on SCr of 1.06 mg/dL).          Results from last 7 days   Lab Units 09/05/20  0438   WBC 10*3/mm3 12.29*   HEMOGLOBIN g/dL 16.0   HEMATOCRIT % 45.5   PLATELETS 10*3/mm3 256           Culture Data:   No results found for: BLOODCX  No results found for: URINECX  No results found for: RESPCX  No results found for: WOUNDCX  No results found for: STOOLCX  No components found for: BODYFLD    Radiology Data:   Imaging Results (Last 24 Hours)     Procedure Component Value Units Date/Time    XR Chest 1 View [[id number]] Collected:  09/05/20 0502     Updated:  09/05/20 0533    Narrative:       PORTABLE CHEST X-RAY    CLINICAL HISTORY: respiratory distress    COMPARISON: None.    FINDINGS: Portable AP view of the chest was obtained with  overlying monitor leads in place. ET tube terminates at the level  of the mid thoracic trachea. Nasogastric tube extends below the  diaphragm but the tip is not imaged. There are diffuse  groundglass and interstitial opacities likely severe edema.  Superimposed pneumonia cannot be entirely excluded. There are  small bilateral effusions. Borderline cardiomegaly.      Impression:       Extensive pulmonary opacities likely edema and  effusions      Electronically signed by:   Wally Orona MD  9/5/2020 5:32 AM CDT  Workstation: [id number]          I have reviewed the patient's current medications.     Assessment/Plan     Active Hospital Problems    Diagnosis   • Acute respiratory failure with hypoxia (CMS/HCC)       Plan:    1.  Acute hypoxic respiratory failure with bilateral pulmonary infiltrates  -Initiated broad-spectrum antibiotics in the ED will continue  - Marked concern for underlying recent cardiovascular event with 2 weeks of epigastric pain/possibility of prior at least impaired fasting glucose if not diabetes and age.  Will check echocardiogram and 1 dose of loop diuretic and have cardiology to see  --  covid is negative  Weaning down on O2 requirements     2.  Abnormal EKG  - See impression inferiorly  -We will rule out overnight  -As above cannot rule out recent myocardial event  -Check echocardiogram for abnormal LV function with question rec  -Rule out overnight with troponin again at 1000 hrs. and 1400 hrs. with initial troponin normal    --repeat  troponins are trending up. Most likely strain from respiratory status. Will consult  Cardiology. . Will monitor for now    3.  Elevated random blood sugar  - Not known to have impaired fasting glucose or diabetes  -Likely related to stress  -Check A1c    Will monitor       4.  Hypertension  -Hold antihypertensive medication with patient hypertensive currently  CODE STATUS patient is a full code

## 2020-09-05 NOTE — H&P
[facility] Medicine Admission      Date of Admission: 9/5/2020      Primary Care Physician: Tyler Singh MD      Chief Complaint: Came in in extremis    HPI:*Briefly this is a 59-year-old gentleman who presented the ED carrying the following problem list    1.  Hypertension  2.  History of BPH  3.  History of appendectomy  4.  History of tobacco use predominately a cigar exposure no history of cigarette use    Patient presents with a week history of progressive shortness of breath.  Of note patient was seen by his PCP on 8/22 complaining of 2-week history of epigastric pain.  Regardless patient brought in with increased respiratory rate and oxygen urgently intubated.  History is obtained from ED physician.  Patient denied any history of fevers, chills cough.  Denies any viral type symptomatology.  Patient's chest x-ray with bilateral process.  His white count was mildly elevated he had no fever although the patient post intubation is requiring vasopressors.  Of note he does have some EKG changes inferiorly bilaterally with initial troponin normal.        Concurrent Medical History:  has a past medical history of Hypertension.    Past Surgical History:  has a past surgical history that includes Appendectomy and Colonoscopy (N/A, 12/20/2019).    Family History: family history is not on file.     Social History:  reports that he has quit smoking. His smoking use included cigars. He quit after 2.00 years of use. He has never used smokeless tobacco. He reports that he drinks about 2.0 standard drinks of alcohol per week. He reports that he has current or past drug history.    Allergies:   Allergies   Allergen Reactions   • Metoprolol Rash       Medications:   Prior to Admission medications    Medication Sig Start Date End Date Taking? Authorizing Provider   amLODIPine (NORVASC) 10 MG tablet Take 10 mg by mouth Every Night.    Provider, MD David      irbesartan-hydrochlorothiazide (AVALIDE) 300-12.5 MG tablet Take 1 tablet by mouth Daily.    Provider, MD David   Multiple Vitamins-Minerals (MULTIVITAL PO) Take 1 tablet/day by mouth Daily.    ProviderDavid MD   naproxen sodium (ALEVE) 220 MG tablet Take 220 mg by mouth Daily.    ProviderDavid MD       Review of Systems:  Review of Systems   Unable to perform ROS: Intubated      Otherwise complete ROS is negative except as mentioned above.    Physical Exam:   Heart Rate:  [108-132] 108  Resp:  [26-32] 32  BP: ()/() 88/63  FiO2 (%):  [100 %] 100 %  Physical Exam   Constitutional: He appears well-developed and well-nourished.   HENT:   Head: Normocephalic and atraumatic.   Eyes: Pupils are equal, round, and reactive to light. EOM are normal.   Neck: Normal range of motion. Neck supple.   Cardiovascular: Normal rate and regular rhythm.   Pulmonary/Chest:   Just upper respiratory sounds and decreased breath sounds at both bases   Abdominal: Soft. Bowel sounds are normal.   Musculoskeletal: Normal range of motion.   Neurological:   Sedated on vent   Skin: Skin is warm and dry. Capillary refill takes less than 2 seconds.   Psychiatric: He has a normal mood and affect.   Nursing note and vitals reviewed.        Results Reviewed:  I have personally reviewed current lab, radiology, and data and agree with results.  Lab Results (last 24 hours)     Procedure Component Value Units Date/Time    Blood Gas, Arterial [[id number]]  (Abnormal) Collected:  09/05/20 0613    Specimen:  Arterial Blood Updated:  09/05/20 0626     Site Left Radial     Lenny's Test N/A     pH, Arterial 7.231 pH units      Comment: 84 Value below reference range        pCO2, Arterial 50.6 mm Hg      Comment: 83 Value above reference range        pO2, Arterial 95.1 mm Hg      HCO3, Arterial 21.2 mmol/L      Base Excess, Arterial -6.7 mmol/L      Comment: 84 Value below reference range        O2 Saturation, Arterial 95.9 %       Barometric Pressure for Blood Gas 755 mmHg      Modality Ventilator     FIO2 100 %      Ventilator Mode AC     Set Tidal Volume 550     Set Mech Resp Rate 20.0     PEEP 5.0     Collected by VC     Comment: Meter: [id number]     :  [id number]       Lactic Acid, Plasma [[id number]]  (Abnormal) Collected:  09/05/20 0525    Specimen:  Blood Updated:  09/05/20 0604     Lactate 2.2 mmol/L     Lactic Acid, Reflex Timer (This will reflex a repeat order 3-3:15 hours after ordered.) [[id number]] Collected:  09/05/20 0525    Specimen:  Blood Updated:  09/05/20 0604    Scenic Draw [[id number]] Collected:  09/05/20 0437    Specimen:  Blood Updated:  09/05/20 0545    Narrative:       The following orders were created for panel order Scenic Draw.  Procedure                               Abnormality         Status                     ---------                               -----------         ------                     Light Blue Top[[id number]]                                   Final result               Green Top (Gel)[[id number]]                                  Final result               Lavender Top[[id number]]                                     Final result               Gold Top - SST[[id number]]                                   Final result                 Please view results for these tests on the individual orders.    Light Blue Top [[id number]] Collected:  09/05/20 0437    Specimen:  Blood Updated:  09/05/20 0545     Extra Tube hold for add-on     Comment: Auto resulted       Green Top (Gel) [[id number]] Collected:  09/05/20 0437    Specimen:  Blood Updated:  09/05/20 0545     Extra Tube Hold for add-ons.     Comment: Auto resulted.       Lavender Top [[id number]] Collected:  09/05/20 0438    Specimen:  Blood Updated:  09/05/20 0545     Extra Tube hold for add-on     Comment: Auto resulted       Gold Top - SST [[id number]] Collected:  09/05/20 0437    Specimen:  Blood Updated:  09/05/20 0545     Extra Tube Hold  for add-ons.     Comment: Auto resulted.       Blood Culture - Blood, Arm, Left [[id number]] Collected:  09/05/20 0525    Specimen:  Blood from Arm, Left Updated:  09/05/20 0538    Comprehensive Metabolic Panel [[id number]]  (Abnormal) Collected:  09/05/20 0437    Specimen:  Blood Updated:  09/05/20 0518     Glucose 191 mg/dL      BUN 12 mg/dL      Creatinine 1.06 mg/dL      Sodium 140 mmol/L      Potassium 3.4 mmol/L      Chloride 103 mmol/L      CO2 25.0 mmol/L      Calcium 8.6 mg/dL      Total Protein 6.6 g/dL      Albumin 4.20 g/dL      ALT (SGPT) 25 U/L      AST (SGOT) 26 U/L      Alkaline Phosphatase 81 U/L      Total Bilirubin 1.5 mg/dL      eGFR Non African Amer 72 mL/min/1.73      Globulin 2.4 gm/dL      A/G Ratio 1.8 g/dL      BUN/Creatinine Ratio 11.3     Anion Gap 12.0 mmol/L     Narrative:       GFR Normal >60  Chronic Kidney Disease <60  Kidney Failure <15      Troponin [[id number]]  (Normal) Collected:  09/05/20 0437    Specimen:  Blood Updated:  09/05/20 0518     Troponin T <0.010 ng/mL     Narrative:       Troponin T Reference Range:  <= 0.03 ng/mL-   Negative for AMI  >0.03 ng/mL-     Abnormal for myocardial necrosis.  Clinicians would have to utilize clinical acumen, EKG, Troponin and serial changes to determine if it is an Acute Myocardial Infarction or myocardial injury due to an underlying chronic condition.       Results may be falsely decreased if patient taking Biotin.      BNP [[id number]]  (Abnormal) Collected:  09/05/20 0437    Specimen:  Blood Updated:  09/05/20 0516     proBNP 3,740.0 pg/mL     Narrative:       Among patients with dyspnea, NT-proBNP is highly sensitive for the detection of acute congestive heart failure. In addition NT-proBNP of <300 pg/ml effectively rules out acute congestive heart failure with 99% negative predictive value.    Results may be falsely decreased if patient taking Biotin.      CBC & Differential [[id number]] Collected:  09/05/20 0438    Specimen:  Blood  Updated:  09/05/20 0455    Narrative:       The following orders were created for panel order CBC & Differential.  Procedure                               Abnormality         Status                     ---------                               -----------         ------                     CBC Auto Differential[[id number]]        Abnormal            Final result                 Please view results for these tests on the individual orders.    CBC Auto Differential [[id number]]  (Abnormal) Collected:  09/05/20 0438    Specimen:  Blood Updated:  09/05/20 0455     WBC 12.29 10*3/mm3      RBC 4.79 10*6/mm3      Hemoglobin 16.0 g/dL      Hematocrit 45.5 %      MCV 95.0 fL      MCH 33.4 pg      MCHC 35.2 g/dL      RDW 12.4 %      RDW-SD 43.8 fl      MPV 11.6 fL      Platelets 256 10*3/mm3      Neutrophil % 64.3 %      Lymphocyte % 26.5 %      Monocyte % 6.7 %      Eosinophil % 1.5 %      Basophil % 0.7 %      Immature Grans % 0.3 %      Neutrophils, Absolute 7.90 10*3/mm3      Lymphocytes, Absolute 3.26 10*3/mm3      Monocytes, Absolute 0.82 10*3/mm3      Eosinophils, Absolute 0.19 10*3/mm3      Basophils, Absolute 0.08 10*3/mm3      Immature Grans, Absolute 0.04 10*3/mm3      nRBC 0.0 /100 WBC     Blood Gas, Arterial [[id number]]  (Abnormal) Collected:  09/05/20 0430    Specimen:  Arterial Blood Updated:  09/05/20 0438     Site Left Radial     Lenny's Test Positive     pH, Arterial 7.200 pH units      Comment: 85 Value below critical limit        pCO2, Arterial 53.7 mm Hg      Comment: 83 Value above reference range        pO2, Arterial 64.6 mm Hg      Comment: 84 Value below reference range        HCO3, Arterial 21.0 mmol/L      Base Excess, Arterial -7.7 mmol/L      Comment: 84 Value below reference range        O2 Saturation, Arterial 86.6 %      Comment: 84 Value below reference range        Barometric Pressure for Blood Gas 754 mmHg      Modality NRB     FIO2 100 %      Flow Rate 15.0 lpm      Ventilator Mode NA      Collected by RONY     Comment: Meter: [id number]     :  [id number]           Imaging Results (Last 24 Hours)     Procedure Component Value Units Date/Time    XR Chest 1 View [[id number]] Collected:  09/05/20 0502     Updated:  09/05/20 0533    Narrative:       PORTABLE CHEST X-RAY    CLINICAL HISTORY: respiratory distress    COMPARISON: None.    FINDINGS: Portable AP view of the chest was obtained with  overlying monitor leads in place. ET tube terminates at the level  of the mid thoracic trachea. Nasogastric tube extends below the  diaphragm but the tip is not imaged. There are diffuse  groundglass and interstitial opacities likely severe edema.  Superimposed pneumonia cannot be entirely excluded. There are  small bilateral effusions. Borderline cardiomegaly.      Impression:       Extensive pulmonary opacities likely edema and  effusions      Electronically signed by:  Wally Orona MD  9/5/2020 5:32 AM CDT  Workstation: [id number]            Assessment:    There are no active hospital problems to display for this patient.    Impression    1.  Acute hypoxic respiratory failure with bilateral pulmonary infiltrates  -Initiated broad-spectrum antibiotics in the ED will continue  - Marked concern for underlying recent cardiovascular event with 2 weeks of epigastric pain/possibility of prior at least impaired fasting glucose if not diabetes and age.  Will check echocardiogram and 1 dose of loop diuretic and have cardiology to see    2.  Abnormal EKG  - See impression inferiorly  -We will rule out overnight  -As above cannot rule out recent myocardial event  -Check echocardiogram for abnormal LV function with question rec  -Rule out overnight with troponin again at 1000 hrs. and 1400 hrs. with initial troponin normal  -Have cardiology to see    3.  Elevated random blood sugar  - Not known to have impaired fasting glucose or diabetes  -Likely related to stress  -Check A1c    4.  Hypertension  -Hold  antihypertensive medication with patient hypertensive currently  CODE STATUS patient is a full code    Prophylaxis  - Subcu Lovenox    Shandra with ED physician as well as ED nursing patient admitted to ICU expect greater than 48-hour stay please see orders.  Briefly patient admitted with acute hypoxic event found to have bilateral infiltrates with marked concern from previous history of recent event.  Will continue with broad-spectrum antibiotics and rule out for COVID however marked concern of underlying cardiovascular event checking echocardiogram and having cardiology to see                          Urbano Osborn MD

## 2020-09-06 LAB
ALBUMIN SERPL-MCNC: 3.3 G/DL (ref 3.5–5.2)
ALBUMIN/GLOB SERPL: 1.7 G/DL
ALP SERPL-CCNC: 62 U/L (ref 39–117)
ALT SERPL W P-5'-P-CCNC: 19 U/L (ref 1–41)
ANION GAP SERPL CALCULATED.3IONS-SCNC: 10 MMOL/L (ref 5–15)
ARTERIAL PATENCY WRIST A: POSITIVE
AST SERPL-CCNC: 27 U/L (ref 1–40)
ATMOSPHERIC PRESS: 752 MMHG
BASE EXCESS BLDA CALC-SCNC: 2.5 MMOL/L (ref 0–2)
BASOPHILS # BLD AUTO: 0.02 10*3/MM3 (ref 0–0.2)
BASOPHILS NFR BLD AUTO: 0.1 % (ref 0–1.5)
BDY SITE: ABNORMAL
BILIRUB SERPL-MCNC: 0.7 MG/DL (ref 0–1.2)
BUN SERPL-MCNC: 18 MG/DL (ref 6–20)
BUN/CREAT SERPL: 18.8 (ref 7–25)
CALCIUM SPEC-SCNC: 8.1 MG/DL (ref 8.6–10.5)
CHLORIDE SERPL-SCNC: 105 MMOL/L (ref 98–107)
CO2 SERPL-SCNC: 25 MMOL/L (ref 22–29)
CREAT SERPL-MCNC: 0.96 MG/DL (ref 0.76–1.27)
DEPRECATED RDW RBC AUTO: 43.3 FL (ref 37–54)
EOSINOPHIL # BLD AUTO: 0 10*3/MM3 (ref 0–0.4)
EOSINOPHIL NFR BLD AUTO: 0 % (ref 0.3–6.2)
ERYTHROCYTE [DISTWIDTH] IN BLOOD BY AUTOMATED COUNT: 12.6 % (ref 12.3–15.4)
GFR SERPL CREATININE-BSD FRML MDRD: 80 ML/MIN/1.73
GLOBULIN UR ELPH-MCNC: 2 GM/DL
GLUCOSE SERPL-MCNC: 101 MG/DL (ref 65–99)
HCO3 BLDA-SCNC: 26.3 MMOL/L (ref 20–26)
HCT VFR BLD AUTO: 37.5 % (ref 37.5–51)
HGB BLD-MCNC: 13.6 G/DL (ref 13–17.7)
IMM GRANULOCYTES # BLD AUTO: 0.05 10*3/MM3 (ref 0–0.05)
IMM GRANULOCYTES NFR BLD AUTO: 0.4 % (ref 0–0.5)
INHALED O2 CONCENTRATION: 50 %
LYMPHOCYTES # BLD AUTO: 0.93 10*3/MM3 (ref 0.7–3.1)
LYMPHOCYTES NFR BLD AUTO: 6.8 % (ref 19.6–45.3)
Lab: ABNORMAL
MAGNESIUM SERPL-MCNC: 2 MG/DL (ref 1.6–2.6)
MCH RBC QN AUTO: 33.7 PG (ref 26.6–33)
MCHC RBC AUTO-ENTMCNC: 36.3 G/DL (ref 31.5–35.7)
MCV RBC AUTO: 92.8 FL (ref 79–97)
MODALITY: ABNORMAL
MONOCYTES # BLD AUTO: 0.9 10*3/MM3 (ref 0.1–0.9)
MONOCYTES NFR BLD AUTO: 6.5 % (ref 5–12)
NEUTROPHILS NFR BLD AUTO: 11.86 10*3/MM3 (ref 1.7–7)
NEUTROPHILS NFR BLD AUTO: 86.2 % (ref 42.7–76)
NRBC BLD AUTO-RTO: 0 /100 WBC (ref 0–0.2)
PAW @ PEAK INSP FLOW SETTING VENT: 28 CMH2O
PCO2 BLDA: 37.5 MM HG (ref 35–45)
PEEP RESPIRATORY: 5 CM[H2O]
PH BLDA: 7.46 PH UNITS (ref 7.35–7.45)
PHOSPHATE SERPL-MCNC: 4.1 MG/DL (ref 2.5–4.5)
PLATELET # BLD AUTO: 188 10*3/MM3 (ref 140–450)
PMV BLD AUTO: 12 FL (ref 6–12)
PO2 BLDA: 85.6 MM HG (ref 83–108)
POTASSIUM SERPL-SCNC: 3.6 MMOL/L (ref 3.5–5.2)
PROT SERPL-MCNC: 5.3 G/DL (ref 6–8.5)
RBC # BLD AUTO: 4.04 10*6/MM3 (ref 4.14–5.8)
SAO2 % BLDCOA: 97.5 % (ref 94–99)
SET MECH RESP RATE: 20
SODIUM SERPL-SCNC: 140 MMOL/L (ref 136–145)
TROPONIN T SERPL-MCNC: 0.24 NG/ML (ref 0–0.03)
VENTILATOR MODE: AC
VT ON VENT VENT: 600 ML
WBC # BLD AUTO: 13.76 10*3/MM3 (ref 3.4–10.8)

## 2020-09-06 PROCEDURE — 94799 UNLISTED PULMONARY SVC/PX: CPT

## 2020-09-06 PROCEDURE — 80053 COMPREHEN METABOLIC PANEL: CPT | Performed by: INTERNAL MEDICINE

## 2020-09-06 PROCEDURE — 87147 CULTURE TYPE IMMUNOLOGIC: CPT | Performed by: HOSPITALIST

## 2020-09-06 PROCEDURE — 94003 VENT MGMT INPAT SUBQ DAY: CPT

## 2020-09-06 PROCEDURE — 25010000002 PROPOFOL 10 MG/ML EMULSION: Performed by: FAMILY MEDICINE

## 2020-09-06 PROCEDURE — 87150 DNA/RNA AMPLIFIED PROBE: CPT | Performed by: HOSPITALIST

## 2020-09-06 PROCEDURE — 25010000002 AZITHROMYCIN PER 500 MG: Performed by: INTERNAL MEDICINE

## 2020-09-06 PROCEDURE — 25010000002 FUROSEMIDE PER 20 MG: Performed by: INTERNAL MEDICINE

## 2020-09-06 PROCEDURE — 25010000002 MIDAZOLAM PER 1 MG: Performed by: INTERNAL MEDICINE

## 2020-09-06 PROCEDURE — 25010000002 ENOXAPARIN PER 10 MG: Performed by: INTERNAL MEDICINE

## 2020-09-06 PROCEDURE — 84100 ASSAY OF PHOSPHORUS: CPT | Performed by: HOSPITALIST

## 2020-09-06 PROCEDURE — 82803 BLOOD GASES ANY COMBINATION: CPT

## 2020-09-06 PROCEDURE — 25010000002 MIDAZOLAM 50 MG/10ML SOLUTION: Performed by: INTERNAL MEDICINE

## 2020-09-06 PROCEDURE — 25010000002 CEFTRIAXONE PER 250 MG: Performed by: INTERNAL MEDICINE

## 2020-09-06 PROCEDURE — 84484 ASSAY OF TROPONIN QUANT: CPT | Performed by: INTERNAL MEDICINE

## 2020-09-06 PROCEDURE — 99255 IP/OBS CONSLTJ NEW/EST HI 80: CPT | Performed by: INTERNAL MEDICINE

## 2020-09-06 PROCEDURE — 36600 WITHDRAWAL OF ARTERIAL BLOOD: CPT

## 2020-09-06 PROCEDURE — 83735 ASSAY OF MAGNESIUM: CPT | Performed by: HOSPITALIST

## 2020-09-06 PROCEDURE — 87040 BLOOD CULTURE FOR BACTERIA: CPT | Performed by: HOSPITALIST

## 2020-09-06 PROCEDURE — 85025 COMPLETE CBC W/AUTO DIFF WBC: CPT | Performed by: HOSPITALIST

## 2020-09-06 RX ORDER — POTASSIUM CHLORIDE 1.5 G/1.77G
40 POWDER, FOR SOLUTION ORAL 2 TIMES DAILY
Status: COMPLETED | OUTPATIENT
Start: 2020-09-06 | End: 2020-09-07

## 2020-09-06 RX ORDER — CHLORHEXIDINE GLUCONATE 0.12 MG/ML
15 RINSE ORAL EVERY 12 HOURS SCHEDULED
Status: DISCONTINUED | OUTPATIENT
Start: 2020-09-06 | End: 2020-09-09

## 2020-09-06 RX ORDER — FUROSEMIDE 10 MG/ML
40 INJECTION INTRAMUSCULAR; INTRAVENOUS DAILY
Status: DISCONTINUED | OUTPATIENT
Start: 2020-09-06 | End: 2020-09-07

## 2020-09-06 RX ADMIN — POTASSIUM CHLORIDE 40 MEQ: 1.5 POWDER, FOR SOLUTION ORAL at 20:29

## 2020-09-06 RX ADMIN — AZITHROMYCIN MONOHYDRATE 500 MG: 500 INJECTION, POWDER, LYOPHILIZED, FOR SOLUTION INTRAVENOUS at 05:31

## 2020-09-06 RX ADMIN — CHLORHEXIDINE GLUCONATE 0.12% ORAL RINSE 15 ML: 1.2 LIQUID ORAL at 20:29

## 2020-09-06 RX ADMIN — SODIUM CHLORIDE, POTASSIUM CHLORIDE, SODIUM LACTATE AND CALCIUM CHLORIDE 100 ML/HR: 600; 310; 30; 20 INJECTION, SOLUTION INTRAVENOUS at 02:34

## 2020-09-06 RX ADMIN — PROPOFOL 50 MCG/KG/MIN: 10 INJECTION, EMULSION INTRAVENOUS at 00:56

## 2020-09-06 RX ADMIN — IPRATROPIUM BROMIDE 2 PUFF: 17 AEROSOL, METERED RESPIRATORY (INHALATION) at 07:57

## 2020-09-06 RX ADMIN — IPRATROPIUM BROMIDE 2 PUFF: 17 AEROSOL, METERED RESPIRATORY (INHALATION) at 14:50

## 2020-09-06 RX ADMIN — SODIUM CHLORIDE, PRESERVATIVE FREE 10 ML: 5 INJECTION INTRAVENOUS at 08:59

## 2020-09-06 RX ADMIN — SODIUM CHLORIDE, PRESERVATIVE FREE 10 ML: 5 INJECTION INTRAVENOUS at 22:29

## 2020-09-06 RX ADMIN — MIDAZOLAM HYDROCHLORIDE 10 MG/HR: 1 INJECTION, SOLUTION INTRAMUSCULAR; INTRAVENOUS at 08:59

## 2020-09-06 RX ADMIN — CEFTRIAXONE 1 G: 1 INJECTION, POWDER, FOR SOLUTION INTRAMUSCULAR; INTRAVENOUS at 05:15

## 2020-09-06 RX ADMIN — PROPOFOL 50 MCG/KG/MIN: 10 INJECTION, EMULSION INTRAVENOUS at 05:11

## 2020-09-06 RX ADMIN — PROPOFOL 50 MCG/KG/MIN: 10 INJECTION, EMULSION INTRAVENOUS at 13:24

## 2020-09-06 RX ADMIN — PROPOFOL 30 MCG/KG/MIN: 10 INJECTION, EMULSION INTRAVENOUS at 18:17

## 2020-09-06 RX ADMIN — IPRATROPIUM BROMIDE 2 PUFF: 17 AEROSOL, METERED RESPIRATORY (INHALATION) at 18:55

## 2020-09-06 RX ADMIN — SODIUM CHLORIDE, POTASSIUM CHLORIDE, SODIUM LACTATE AND CALCIUM CHLORIDE 100 ML/HR: 600; 310; 30; 20 INJECTION, SOLUTION INTRAVENOUS at 13:24

## 2020-09-06 RX ADMIN — ALBUTEROL SULFATE 2 PUFF: 90 AEROSOL, METERED RESPIRATORY (INHALATION) at 11:44

## 2020-09-06 RX ADMIN — POTASSIUM CHLORIDE 40 MEQ: 1.5 POWDER, FOR SOLUTION ORAL at 10:20

## 2020-09-06 RX ADMIN — MIDAZOLAM HYDROCHLORIDE 10 MG/HR: 1 INJECTION, SOLUTION INTRAMUSCULAR; INTRAVENOUS at 20:54

## 2020-09-06 RX ADMIN — MIDAZOLAM HYDROCHLORIDE 10 MG/HR: 1 INJECTION, SOLUTION INTRAMUSCULAR; INTRAVENOUS at 02:41

## 2020-09-06 RX ADMIN — ALBUTEROL SULFATE 2 PUFF: 90 AEROSOL, METERED RESPIRATORY (INHALATION) at 18:56

## 2020-09-06 RX ADMIN — IPRATROPIUM BROMIDE 2 PUFF: 17 AEROSOL, METERED RESPIRATORY (INHALATION) at 11:44

## 2020-09-06 RX ADMIN — FAMOTIDINE 20 MG: 10 INJECTION INTRAVENOUS at 20:29

## 2020-09-06 RX ADMIN — ALBUTEROL SULFATE 2 PUFF: 90 AEROSOL, METERED RESPIRATORY (INHALATION) at 14:50

## 2020-09-06 RX ADMIN — SODIUM CHLORIDE, POTASSIUM CHLORIDE, SODIUM LACTATE AND CALCIUM CHLORIDE 100 ML/HR: 600; 310; 30; 20 INJECTION, SOLUTION INTRAVENOUS at 23:45

## 2020-09-06 RX ADMIN — ALBUTEROL SULFATE 2 PUFF: 90 AEROSOL, METERED RESPIRATORY (INHALATION) at 07:56

## 2020-09-06 RX ADMIN — PROPOFOL 30 MCG/KG/MIN: 10 INJECTION, EMULSION INTRAVENOUS at 23:04

## 2020-09-06 RX ADMIN — FUROSEMIDE 40 MG: 10 INJECTION, SOLUTION INTRAMUSCULAR; INTRAVENOUS at 10:20

## 2020-09-06 RX ADMIN — ENOXAPARIN SODIUM 40 MG: 40 INJECTION SUBCUTANEOUS at 08:59

## 2020-09-06 RX ADMIN — FAMOTIDINE 20 MG: 10 INJECTION INTRAVENOUS at 08:59

## 2020-09-06 RX ADMIN — PROPOFOL 50 MCG/KG/MIN: 10 INJECTION, EMULSION INTRAVENOUS at 08:58

## 2020-09-06 NOTE — CONSULTS
[facility] Cardiology  INPATIENT CONSULT NOTE  Mark Sharma  59 y.o. male    Referring Provider: Vic Rodriguez, *    Reason for the consult: Elevated troponin      Chief complaint: Dyspnea    History of present Illness:  Patient came in with increasing shortness of air.  He denies any chest pain.  He was struggling breathing to the point that he was put on a ventilator.  He had fluid overload.  He is received IV Lasix with good diuresis.  2D echo was done and this revealed severe aortic stenosis.  He has a heavily calcified valve.  There is no prior history of him knowing this.  He is intubated and cannot give any further history.          Allergies:   Allergies   Allergen Reactions   • Metoprolol Rash         Past Medical History:   Diagnosis Date   • Hypertension          Past Surgical History:   Procedure Laterality Date   • APPENDECTOMY     • COLONOSCOPY N/A 12/20/2019    Procedure: COLONOSCOPY;  Surgeon: Dion Ambrocio DO;  Location: [facility] ENDOSCOPY;  Service: Gastroenterology         No family history on file.      Social History     Socioeconomic History   • Marital status: Single     Spouse name: Not on file   • Number of children: Not on file   • Years of education: Not on file   • Highest education level: Not on file   Tobacco Use   • Smoking status: Former Smoker     Years: 2.00     Types: Cigars   • Smokeless tobacco: Never Used   Substance and Sexual Activity   • Alcohol use: Yes     Alcohol/week: 2.0 standard drinks     Types: 2 Cans of beer per week     Comment: 2 beer/wk maybe 6-8 on wknd   • Drug use: Not Currently   • Sexual activity: Defer         Current Facility-Administered Medications   Medication Dose Route Frequency Provider Last Rate Last Dose   • albuterol sulfate HFA (PROVENTIL HFA;VENTOLIN HFA;PROAIR HFA) inhaler 2 puff  2 puff Inhalation 4x Daily - RT Madi Griffith MD   2 puff at 09/06/20 0756   • AZITHROMYCIN 500 MG/250 ML  0.9% NS IVPB (vial-mate)  500 mg Intravenous Q24H Urbano Osborn MD   500 mg at 09/06/20 0531   • cefTRIAXone (ROCEPHIN) 1 g/100 mL 0.9% NS (MBP)  1 g Intravenous Q24H Urbano Osborn MD   1 g at 09/06/20 0515   • enoxaparin (LOVENOX) syringe 40 mg  40 mg Subcutaneous Q24H Urbano Osborn MD   40 mg at 09/05/20 0958   • famotidine (PEPCID) injection 20 mg  20 mg Intravenous Q12H Madi Griffith MD   20 mg at 09/05/20 2039   • furosemide (LASIX) injection 40 mg  40 mg Intravenous Daily Veronica Gonzalez MD       • ipratropium (ATROVENT HFA) inhaler 2 puff  2 puff Inhalation 4x Daily - RT Madi Griffith MD   2 puff at 09/06/20 0757   • lactated ringers infusion  100 mL/hr Intravenous Continuous Urbano Osborn  mL/hr at 09/06/20 0234 100 mL/hr at 09/06/20 0234   • midazolam (VERSED) 50mg/100mL normal saline infusion  1-10 mg/hr Intravenous Titrated Urbano Osborn MD 20 mL/hr at 09/06/20 0241 10 mg/hr at 09/06/20 0241   • norepinephrine (LEVOPHED) 8 mg/250 mL (32 mcg/mL) in sodium chloride 0.9% infusion (premix)  0.02-0.3 mcg/kg/min Intravenous Titrated Vic Rodriguez MD   Stopped at 09/05/20 1045   • potassium chloride (KLOR-CON) packet 40 mEq  40 mEq Oral BID Veronica Gonzalez MD       • propofol (DIPRIVAN) infusion 10 mg/mL 100 mL  5-50 mcg/kg/min Intravenous Titrated Vic Rodriguez MD 27.2 mL/hr at 09/06/20 0511 50 mcg/kg/min at 09/06/20 0511   • sodium chloride 0.9 % flush 10 mL  10 mL Intravenous PRN Vic Rodriguez MD       • sodium chloride 0.9 % flush 10 mL  10 mL Intravenous Q12H Urbano Osborn MD   10 mL at 09/05/20 2206   • sodium chloride 0.9 % flush 10 mL  10 mL Intravenous PRN Urbano Osborn MD             Review of Systems:     Constitution:  Denies any fatigue, fever or chills.    HENT:  Denies any headache, hearing impairment.    Eyes:  Denies any blurring of vision, or photophobia.      "  Cardiovascular:  As per history of present illness.     Respiratory:  Denies any COPD, shortness of breath.     Endocrine:  No history of hyperlipidemia, diabetes.       Musculoskeletal:  No history of arthritis with musculoskeletal problems.    Gastrointestinal:  No nausea, vomiting, or melena.    Genitourinary:  No dysuria or hematuria.    Neurological:  No history of seizure disorder, stroke, or memory problems.    Psychiatric/Behavioral:  No history of depression, bipolar disorder or schizophrenia.     Hematological:  No history of easy bruising.            OBJECTIVE:    /64   Pulse 78   Temp 98.5 °F (36.9 °C) (Axillary)   Resp 20   Ht 182.9 cm (72.01\")   Wt 91.9 kg (202 lb 9.6 oz)   SpO2 95%   BMI 27.47 kg/m²       Physical Exam:     Constitutional:  Well developed and nourished, on ventilator sedated.  Skin:  Warm and dry.     Head:  Normocephalic and atraumatic.     Eyes:  Pupils are equal, round, and reactive to light.     Neck:  Neck supple. No bruit in the carotids.  No elevation of JVD.    Cardiovascular:  Lorimor in the fifth intercostal space, regular rate, and rhythm.  S1 greater than S2, no S3 or S4, no gallop.  There is a 2/6 systolic ejection murmur at the upper sternal border    Pulmonary/Chest:  Air entry is equal on both sides.  No wheezing or crackles.      Abdominal:  Soft. No hepatosplenomegaly, bowel sounds are present.    Musculoskeletal:  No kyphoscoliosis.    Neurological: Sedated.  Cranial nerves are intact.  No motor or sensory deficit.    Extremities: Trace edema, no radial femoral delay.    Psychiatric:  The patient has a normal mood and affect.  Behavior is normal.        Lab Results (last 24 hours)     Procedure Component Value Units Date/Time    Troponin [[id number]]  (Abnormal) Collected:  09/05/20 0932    Specimen:  Blood Updated:  09/05/20 1026     Troponin T 0.116 ng/mL     Narrative:       Troponin T Reference Range:  <= 0.03 ng/mL-   Negative for AMI  >0.03 ng/mL-   "   Abnormal for myocardial necrosis.  Clinicians would have to utilize clinical acumen, EKG, Troponin and serial changes to determine if it is an Acute Myocardial Infarction or myocardial injury due to an underlying chronic condition.       Results may be falsely decreased if patient taking Biotin.      Lactic Acid, Reflex [[id number]]  (Normal) Collected:  09/05/20 0932    Specimen:  Blood Updated:  09/05/20 1007     Lactate 1.0 mmol/L     Troponin [[id number]]  (Abnormal) Collected:  09/05/20 1335    Specimen:  Blood Updated:  09/05/20 1416     Troponin T 0.221 ng/mL     Narrative:       Troponin T Reference Range:  <= 0.03 ng/mL-   Negative for AMI  >0.03 ng/mL-     Abnormal for myocardial necrosis.  Clinicians would have to utilize clinical acumen, EKG, Troponin and serial changes to determine if it is an Acute Myocardial Infarction or myocardial injury due to an underlying chronic condition.       Results may be falsely decreased if patient taking Biotin.      Urinalysis With Culture If Indicated - Urine, Catheter [[id number]]  (Abnormal) Collected:  09/05/20 1553    Specimen:  Urine, Catheter Updated:  09/05/20 1600     Color, UA Yellow     Appearance, UA Clear     pH, UA 5.5     Specific Gravity, UA 1.020     Glucose, UA Negative     Ketones, UA Trace     Bilirubin, UA Negative     Blood, UA Moderate (2+)     Protein, UA Negative     Leuk Esterase, UA Negative     Nitrite, UA Negative     Urobilinogen, UA 0.2 E.U./dL    Urinalysis, Microscopic Only - Urine, Catheter [[id number]]  (Abnormal) Collected:  09/05/20 1553    Specimen:  Urine, Catheter Updated:  09/05/20 1600     RBC, UA 21-30 /HPF      WBC, UA 0-2 /HPF      Bacteria, UA None Seen /HPF      Squamous Epithelial Cells, UA None Seen /HPF      Hyaline Casts, UA None Seen /LPF      Methodology Automated Microscopy    Respiratory Culture - Sputum, NT Suction [[id number]] Collected:  09/05/20 1610    Specimen:  Sputum from NT Suction Updated:  09/05/20 1649      Gram Stain Moderate (3+) WBCs per low power field      Rare (1+) Epithelial cells per low power field      Mixed bacterial herlinda    Troponin [[id number]]  (Abnormal) Collected:  09/06/20 0219    Specimen:  Blood Updated:  09/06/20 0337     Troponin T 0.237 ng/mL     Narrative:       Troponin T Reference Range:  <= 0.03 ng/mL-   Negative for AMI  >0.03 ng/mL-     Abnormal for myocardial necrosis.  Clinicians would have to utilize clinical acumen, EKG, Troponin and serial changes to determine if it is an Acute Myocardial Infarction or myocardial injury due to an underlying chronic condition.       Results may be falsely decreased if patient taking Biotin.      Comprehensive Metabolic Panel [[id number]]  (Abnormal) Collected:  09/06/20 0219    Specimen:  Blood Updated:  09/06/20 0355     Glucose 101 mg/dL      BUN 18 mg/dL      Creatinine 0.96 mg/dL      Sodium 140 mmol/L      Potassium 3.6 mmol/L      Chloride 105 mmol/L      CO2 25.0 mmol/L      Calcium 8.1 mg/dL      Total Protein 5.3 g/dL      Albumin 3.30 g/dL      ALT (SGPT) 19 U/L      AST (SGOT) 27 U/L      Alkaline Phosphatase 62 U/L      Total Bilirubin 0.7 mg/dL      eGFR Non African Amer 80 mL/min/1.73      Globulin 2.0 gm/dL      A/G Ratio 1.7 g/dL      BUN/Creatinine Ratio 18.8     Anion Gap 10.0 mmol/L     Narrative:       GFR Normal >60  Chronic Kidney Disease <60  Kidney Failure <15      CBC & Differential [[id number]] Collected:  09/06/20 0219    Specimen:  Blood Updated:  09/06/20 0328    Narrative:       The following orders were created for panel order CBC & Differential.  Procedure                               Abnormality         Status                     ---------                               -----------         ------                     CBC Auto Differential[[id number]]        Abnormal            Final result                 Please view results for these tests on the individual orders.    Magnesium [[id number]]  (Normal) Collected:  09/06/20 0219     Specimen:  Blood Updated:  09/06/20 0341     Magnesium 2.0 mg/dL     Phosphorus [[id number]]  (Normal) Collected:  09/06/20 0219    Specimen:  Blood Updated:  09/06/20 0341     Phosphorus 4.1 mg/dL     CBC Auto Differential [[id number]]  (Abnormal) Collected:  09/06/20 0219    Specimen:  Blood Updated:  09/06/20 0328     WBC 13.76 10*3/mm3      RBC 4.04 10*6/mm3      Hemoglobin 13.6 g/dL      Hematocrit 37.5 %      MCV 92.8 fL      MCH 33.7 pg      MCHC 36.3 g/dL      RDW 12.6 %      RDW-SD 43.3 fl      MPV 12.0 fL      Platelets 188 10*3/mm3      Neutrophil % 86.2 %      Lymphocyte % 6.8 %      Monocyte % 6.5 %      Eosinophil % 0.0 %      Basophil % 0.1 %      Immature Grans % 0.4 %      Neutrophils, Absolute 11.86 10*3/mm3      Lymphocytes, Absolute 0.93 10*3/mm3      Monocytes, Absolute 0.90 10*3/mm3      Eosinophils, Absolute 0.00 10*3/mm3      Basophils, Absolute 0.02 10*3/mm3      Immature Grans, Absolute 0.05 10*3/mm3      nRBC 0.0 /100 WBC     Blood Gas, Arterial [[id number]]  (Abnormal) Collected:  09/06/20 0410    Specimen:  Arterial Blood Updated:  09/06/20 0418     Site Right Radial     Lenny's Test Positive     pH, Arterial 7.455 pH units      Comment: 83 Value above reference range        pCO2, Arterial 37.5 mm Hg      pO2, Arterial 85.6 mm Hg      HCO3, Arterial 26.3 mmol/L      Comment: 83 Value above reference range        Base Excess, Arterial 2.5 mmol/L      Comment: 83 Value above reference range        O2 Saturation, Arterial 97.5 %      Barometric Pressure for Blood Gas 752 mmHg      Modality Ventilator     FIO2 50 %      Ventilator Mode AC     Set Tidal Volume 600     Set Mech Resp Rate 20.0     PEEP 5.0     PIP 28 cmH2O      Comment: Meter: [id number]     :  [id number]        Collected by NC    Blood Culture - Blood, Hand, Right [[id number]] Collected:  09/06/20 0545    Specimen:  Blood from Hand, Right Updated:  09/06/20 0554                 A/P:  1.Aortic Stenosis  prob  severe  2.Troponin elevation -secondary to #1 and hypoxemia.  3.Hypoxemia  By echo he had severe aortic stenosis.  I think the troponin elevation and even possibly the hypoxemia is secondary to this.  He had good diuresis and I think his fluid overload was also once again related to the aortic stenosis.  We will continue mild diuresis.  Obviously this is not something that is new and we will slowly have him wean off the ventilator while continuing his diuresis.  Long-term he will need a left and right heart catheterization.  This is nonemergent.          This document has been electronically signed by Veronica Gonzalez MD on September 6, 2020 08:41           Part of this note may be an electronic transcription/translation of spoken language to printed text using the Dragon Dictation System.

## 2020-09-06 NOTE — PLAN OF CARE
Pt is stable on current settings    Problem: Ventilation, Mechanical Invasive (Adult)  Goal: Signs and Symptoms of Listed Potential Problems Will be Absent, Minimized or Managed (Ventilation, Mechanical Invasive)  Outcome: Ongoing (interventions implemented as appropriate)  Flowsheets (Taken 9/6/2020 1649)  Problems Assessed (Mechanical Ventilation, Invasive): inability to wean  Problems Present (Mech Vent, Invasive): inability to wean     Problem: Ventilation, Mechanical Invasive (Adult)  Intervention: Prevent Airway Displacement/Mechanical Dysfunction  Flowsheets (Taken 9/6/2020 1649)  Airway Safety Measures: manual resuscitator/mask/valve in room; suction at bedside  Intervention: Prevent Ventilator-Induced Lung Injury  Flowsheets (Taken 9/6/2020 1649)  Lung Protection Measures: plateau/inspiratory pressure monitored; ventilator synchrony promoted; ventilator waveforms monitored  Intervention: Optimize Oxygenation/Ventilation  Flowsheets (Taken 9/6/2020 1649)  Airway/Ventilation Management: airway patency maintained; calming measures promoted; humidification applied; pulmonary hygiene promoted

## 2020-09-06 NOTE — PROGRESS NOTES
.      [facility] Medicine Services  INPATIENT PROGRESS NOTE    Length of Stay: 1  Date of Admission: 9/5/2020  Primary Care Physician: Tyler Singh MD      HPI:    a 59-year-old gentleman who presented the ED carrying the following problem list   .  Hypertension  History of BPH  History of appendectomy History of tobacco use predominately a cigar exposure no history of cigarette use     Patient presents with a week history of progressive shortness of breath.  Of note patient was seen by his PCP on 8/22 complaining of 2-week history of epigastric pain.  Regardless patient brought in with increased respiratory rate and oxygen urgently intubated.  History is obtained from ED physician.  Patient denied any history of fevers, chills cough.  Denies any viral type symptomatology.  Patient's chest x-ray with bilateral process.  His white count was mildly elevated he had no fever although the patient post intubation is requiring vasopressors.  Of note he does have some EKG changes inferiorly bilaterally with initial troponin normal.      Subjective:  Patient seen and evaluated on rounds today in the ICU. Remains on the vent. Has been able to wean down on the FiO2. Per nursing is responding to the lasix. Sedation is appropriate.     Review of Systems     All pertinent negatives and positives are as above. All other systems have been reviewed and are negative unless otherwise stated.     Objective    Temp:  [98.5 °F (36.9 °C)-99.2 °F (37.3 °C)] 98.5 °F (36.9 °C)  Heart Rate:  [77-93] 81  Resp:  [20-22] 20  BP: ()/(56-84) 104/64  FiO2 (%):  [35 %-60 %] 35 %    Physical Exam  *Constitutional:  well-developed and well-nourished. Sedated on the ventilator  HENT:   Head: Atraumatic. Normocephalic  Right Ear: External ear normal.   Left Ear: External ear normal.   Nose: Nose normal.   Neck: No JVD present.   Cardiovascular: Regular rhythm, normal heart sounds and intact distal  pulses.   tachycardia   Pulmonary/Chest:   Decreased throughout, rhonchi noted, no wheezes no rales   Abdominal: Soft. Bowel sounds are normal.   Musculoskeletal: He exhibits no edema or deformity.   Neurological:   Sedated on the vent   Skin: Skin is warm and dry.   Psychiatric:   Unable to assess sedated on the vent   Nursing note and vitals reviewed.      Results Review:  I have reviewed the labs, radiology results, and diagnostic studies.    Laboratory Data:   Results from last 7 days   Lab Units 09/06/20 0219 09/05/20  0437   SODIUM mmol/L 140 140   POTASSIUM mmol/L 3.6 3.4*   CHLORIDE mmol/L 105 103   CO2 mmol/L 25.0 25.0   BUN mg/dL 18 12   CREATININE mg/dL 0.96 1.06   GLUCOSE mg/dL 101* 191*   CALCIUM mg/dL 8.1* 8.6   BILIRUBIN mg/dL 0.7 1.5*   ALK PHOS U/L 62 81   ALT (SGPT) U/L 19 25   AST (SGOT) U/L 27 26   ANION GAP mmol/L 10.0 12.0     Estimated Creatinine Clearance: 107.7 mL/min (by C-G formula based on SCr of 0.96 mg/dL).  Results from last 7 days   Lab Units 09/06/20  0219   MAGNESIUM mg/dL 2.0   PHOSPHORUS mg/dL 4.1         Results from last 7 days   Lab Units 09/06/20 0219 09/05/20  0438   WBC 10*3/mm3 13.76* 12.29*   HEMOGLOBIN g/dL 13.6 16.0   HEMATOCRIT % 37.5 45.5   PLATELETS 10*3/mm3 188 256           Culture Data:   Blood Culture   Date Value Ref Range Status   09/05/2020 No growth at 24 hours  Preliminary     No results found for: URINECX  Respiratory Culture   Date Value Ref Range Status   09/05/2020   Preliminary    Rare Normal Respiratory Christen: NO S.aureus/MRSA or Pseudomonas aeruginosa     No results found for: WOUNDCX  No results found for: STOOLCX  No components found for: BODYFLD    Radiology Data:   Imaging Results (Last 24 Hours)     ** No results found for the last 24 hours. **          I have reviewed the patient's current medications.     Assessment/Plan     Active Hospital Problems    Diagnosis   • Acute respiratory failure with hypoxia (CMS/HCC)       Plan:   **    Respiratory    Acute hypoxic respiratory failure with bilateral pulmonary infiltrates  Currently on rocephin and azithromycin  -Initiated broad-spectrum antibiotics in the ED will continue  - will continue ventilator today and consider weaning tomorrow.   -covid is negative  Blood cultures negative to date  Lung Cx respiratory herlinda    Cardiovascular  Elevated troponin-  Appreciate Dr Gonzalez seeing the patient. Appears that he has sever aortic stenosis which is contributing to the hypoxia and elevation of the troponin. Will monitor for now and will need a cath once the acute events are resolve.      Neurologic-   Sedated on the vent    GI  On pepcid for prophylaxis    Renal   Responding well to lasix  BUN abd creatinine stable will monitor    F/E/Nutrition  If not able to come off the vent consider feedings. (but is weaning nicely at this point)         critical care time 75 minutes spent in the evaluation of patient, formulation and implementation of treatment plan and discussion with care team, staff and others providers.       The patient was evaluated during the global COVID-19 pandemic, and the diagnosis was suspected/considered upon their initial presentation.  Evaluation, treatment, and testing were consistent with current guidelines for patients who present with complaints or symptoms that may be related to COVID-19.

## 2020-09-06 NOTE — PLAN OF CARE
Problem: Ventilation, Mechanical Invasive (Adult)  Goal: Signs and Symptoms of Listed Potential Problems Will be Absent, Minimized or Managed (Ventilation, Mechanical Invasive)  Outcome: Ongoing (interventions implemented as appropriate)  Flowsheets (Taken 9/5/2020 1625 by Mohsen Mckeon, RUTH)  Problems Assessed (Mechanical Ventilation, Invasive): all  Problems Present (Mech Vent, Invasive): inability to wean;situational response   Pt tolerating current vent settings, no signs of distress at this time. Will continue to monitor.

## 2020-09-07 ENCOUNTER — APPOINTMENT (OUTPATIENT)
Dept: GENERAL RADIOLOGY | Facility: HOSPITAL | Age: 60
End: 2020-09-07

## 2020-09-07 PROBLEM — I50.21 ACUTE SYSTOLIC CHF (CONGESTIVE HEART FAILURE) (HCC): Status: ACTIVE | Noted: 2020-09-07

## 2020-09-07 PROBLEM — I35.0 AORTIC STENOSIS, SEVERE: Status: ACTIVE | Noted: 2020-09-07

## 2020-09-07 LAB
ALBUMIN SERPL-MCNC: 3.3 G/DL (ref 3.5–5.2)
ALBUMIN/GLOB SERPL: 1.7 G/DL
ALP SERPL-CCNC: 60 U/L (ref 39–117)
ALT SERPL W P-5'-P-CCNC: 17 U/L (ref 1–41)
ANION GAP SERPL CALCULATED.3IONS-SCNC: 7 MMOL/L (ref 5–15)
ARTERIAL PATENCY WRIST A: POSITIVE
AST SERPL-CCNC: 23 U/L (ref 1–40)
ATMOSPHERIC PRESS: 746 MMHG
BACTERIA BLD CULT: ABNORMAL
BACTERIA SPEC RESP CULT: NORMAL
BASE EXCESS BLDA CALC-SCNC: 3.5 MMOL/L (ref 0–2)
BASOPHILS # BLD AUTO: 0.04 10*3/MM3 (ref 0–0.2)
BASOPHILS NFR BLD AUTO: 0.4 % (ref 0–1.5)
BDY SITE: ABNORMAL
BILIRUB SERPL-MCNC: 0.8 MG/DL (ref 0–1.2)
BUN SERPL-MCNC: 16 MG/DL (ref 6–20)
BUN/CREAT SERPL: 18.2 (ref 7–25)
CALCIUM SPEC-SCNC: 8 MG/DL (ref 8.6–10.5)
CHLORIDE SERPL-SCNC: 106 MMOL/L (ref 98–107)
CO2 SERPL-SCNC: 26 MMOL/L (ref 22–29)
CREAT SERPL-MCNC: 0.88 MG/DL (ref 0.76–1.27)
DEPRECATED RDW RBC AUTO: 43.3 FL (ref 37–54)
EOSINOPHIL # BLD AUTO: 0.03 10*3/MM3 (ref 0–0.4)
EOSINOPHIL NFR BLD AUTO: 0.3 % (ref 0.3–6.2)
ERYTHROCYTE [DISTWIDTH] IN BLOOD BY AUTOMATED COUNT: 12.4 % (ref 12.3–15.4)
GFR SERPL CREATININE-BSD FRML MDRD: 89 ML/MIN/1.73
GLOBULIN UR ELPH-MCNC: 1.9 GM/DL
GLUCOSE SERPL-MCNC: 97 MG/DL (ref 65–99)
GRAM STN SPEC: NORMAL
HCO3 BLDA-SCNC: 27.3 MMOL/L (ref 20–26)
HCT VFR BLD AUTO: 38.2 % (ref 37.5–51)
HGB BLD-MCNC: 13.2 G/DL (ref 13–17.7)
IMM GRANULOCYTES # BLD AUTO: 0.03 10*3/MM3 (ref 0–0.05)
IMM GRANULOCYTES NFR BLD AUTO: 0.3 % (ref 0–0.5)
INHALED O2 CONCENTRATION: 35 %
LYMPHOCYTES # BLD AUTO: 1.47 10*3/MM3 (ref 0.7–3.1)
LYMPHOCYTES NFR BLD AUTO: 14.7 % (ref 19.6–45.3)
Lab: ABNORMAL
MAGNESIUM SERPL-MCNC: 2 MG/DL (ref 1.6–2.6)
MCH RBC QN AUTO: 33.1 PG (ref 26.6–33)
MCHC RBC AUTO-ENTMCNC: 34.6 G/DL (ref 31.5–35.7)
MCV RBC AUTO: 95.7 FL (ref 79–97)
MODALITY: ABNORMAL
MONOCYTES # BLD AUTO: 0.92 10*3/MM3 (ref 0.1–0.9)
MONOCYTES NFR BLD AUTO: 9.2 % (ref 5–12)
NEUTROPHILS NFR BLD AUTO: 7.49 10*3/MM3 (ref 1.7–7)
NEUTROPHILS NFR BLD AUTO: 75.1 % (ref 42.7–76)
NRBC BLD AUTO-RTO: 0 /100 WBC (ref 0–0.2)
PCO2 BLDA: 38.1 MM HG (ref 35–45)
PEEP RESPIRATORY: 5 CM[H2O]
PH BLDA: 7.46 PH UNITS (ref 7.35–7.45)
PHOSPHATE SERPL-MCNC: 2.9 MG/DL (ref 2.5–4.5)
PLATELET # BLD AUTO: 158 10*3/MM3 (ref 140–450)
PMV BLD AUTO: 11.9 FL (ref 6–12)
PO2 BLDA: 73.3 MM HG (ref 83–108)
POTASSIUM SERPL-SCNC: 3.7 MMOL/L (ref 3.5–5.2)
PROT SERPL-MCNC: 5.2 G/DL (ref 6–8.5)
RBC # BLD AUTO: 3.99 10*6/MM3 (ref 4.14–5.8)
SAO2 % BLDCOA: 95.3 % (ref 94–99)
SET MECH RESP RATE: 16
SODIUM SERPL-SCNC: 139 MMOL/L (ref 136–145)
VENTILATOR MODE: AC
VT ON VENT VENT: 600 ML
WBC # BLD AUTO: 9.98 10*3/MM3 (ref 3.4–10.8)

## 2020-09-07 PROCEDURE — 94799 UNLISTED PULMONARY SVC/PX: CPT

## 2020-09-07 PROCEDURE — 36600 WITHDRAWAL OF ARTERIAL BLOOD: CPT

## 2020-09-07 PROCEDURE — 25010000002 PROPOFOL 10 MG/ML EMULSION: Performed by: FAMILY MEDICINE

## 2020-09-07 PROCEDURE — 25010000002 AZITHROMYCIN PER 500 MG: Performed by: INTERNAL MEDICINE

## 2020-09-07 PROCEDURE — 80053 COMPREHEN METABOLIC PANEL: CPT | Performed by: HOSPITALIST

## 2020-09-07 PROCEDURE — 82803 BLOOD GASES ANY COMBINATION: CPT

## 2020-09-07 PROCEDURE — 85025 COMPLETE CBC W/AUTO DIFF WBC: CPT | Performed by: HOSPITALIST

## 2020-09-07 PROCEDURE — 84100 ASSAY OF PHOSPHORUS: CPT | Performed by: HOSPITALIST

## 2020-09-07 PROCEDURE — 25010000002 THIAMINE PER 100 MG: Performed by: INTERNAL MEDICINE

## 2020-09-07 PROCEDURE — 25010000002 ENOXAPARIN PER 10 MG: Performed by: INTERNAL MEDICINE

## 2020-09-07 PROCEDURE — 25010000002 FUROSEMIDE PER 20 MG: Performed by: NURSE PRACTITIONER

## 2020-09-07 PROCEDURE — 99233 SBSQ HOSP IP/OBS HIGH 50: CPT | Performed by: NURSE PRACTITIONER

## 2020-09-07 PROCEDURE — 25010000002 FUROSEMIDE PER 20 MG: Performed by: INTERNAL MEDICINE

## 2020-09-07 PROCEDURE — 25010000002 VANCOMYCIN 5 G RECONSTITUTED SOLUTION: Performed by: INTERNAL MEDICINE

## 2020-09-07 PROCEDURE — 25010000002 MIDAZOLAM PER 1 MG: Performed by: INTERNAL MEDICINE

## 2020-09-07 PROCEDURE — 83735 ASSAY OF MAGNESIUM: CPT | Performed by: HOSPITALIST

## 2020-09-07 PROCEDURE — 25010000002 CEFTRIAXONE PER 250 MG: Performed by: INTERNAL MEDICINE

## 2020-09-07 PROCEDURE — 74018 RADEX ABDOMEN 1 VIEW: CPT

## 2020-09-07 PROCEDURE — 94003 VENT MGMT INPAT SUBQ DAY: CPT

## 2020-09-07 RX ORDER — NICOTINE 21 MG/24HR
1 PATCH, TRANSDERMAL 24 HOURS TRANSDERMAL
Status: DISCONTINUED | OUTPATIENT
Start: 2020-09-07 | End: 2020-09-11 | Stop reason: HOSPADM

## 2020-09-07 RX ORDER — FENTANYL CITRATE 50 UG/ML
25 INJECTION, SOLUTION INTRAMUSCULAR; INTRAVENOUS
Status: DISCONTINUED | OUTPATIENT
Start: 2020-09-07 | End: 2020-09-11 | Stop reason: HOSPADM

## 2020-09-07 RX ORDER — FUROSEMIDE 10 MG/ML
20 INJECTION INTRAMUSCULAR; INTRAVENOUS EVERY 6 HOURS
Status: DISCONTINUED | OUTPATIENT
Start: 2020-09-07 | End: 2020-09-08

## 2020-09-07 RX ADMIN — MIDAZOLAM HYDROCHLORIDE 10 MG/HR: 1 INJECTION, SOLUTION INTRAMUSCULAR; INTRAVENOUS at 03:51

## 2020-09-07 RX ADMIN — PROPOFOL 30 MCG/KG/MIN: 10 INJECTION, EMULSION INTRAVENOUS at 11:08

## 2020-09-07 RX ADMIN — SODIUM CHLORIDE, PRESERVATIVE FREE 10 ML: 5 INJECTION INTRAVENOUS at 08:03

## 2020-09-07 RX ADMIN — AZITHROMYCIN MONOHYDRATE 500 MG: 500 INJECTION, POWDER, LYOPHILIZED, FOR SOLUTION INTRAVENOUS at 06:32

## 2020-09-07 RX ADMIN — CHLORHEXIDINE GLUCONATE 0.12% ORAL RINSE 15 ML: 1.2 LIQUID ORAL at 08:03

## 2020-09-07 RX ADMIN — NICOTINE 1 PATCH: 14 PATCH, EXTENDED RELEASE TRANSDERMAL at 11:21

## 2020-09-07 RX ADMIN — IPRATROPIUM BROMIDE 2 PUFF: 17 AEROSOL, METERED RESPIRATORY (INHALATION) at 14:52

## 2020-09-07 RX ADMIN — PROPOFOL 20 MCG/KG/MIN: 10 INJECTION, EMULSION INTRAVENOUS at 19:18

## 2020-09-07 RX ADMIN — SODIUM CHLORIDE, PRESERVATIVE FREE 10 ML: 5 INJECTION INTRAVENOUS at 20:03

## 2020-09-07 RX ADMIN — MIDAZOLAM HYDROCHLORIDE 10 MG/HR: 1 INJECTION, SOLUTION INTRAMUSCULAR; INTRAVENOUS at 21:18

## 2020-09-07 RX ADMIN — ALBUTEROL SULFATE 2 PUFF: 90 AEROSOL, METERED RESPIRATORY (INHALATION) at 18:59

## 2020-09-07 RX ADMIN — CEFTRIAXONE 1 G: 1 INJECTION, POWDER, FOR SOLUTION INTRAMUSCULAR; INTRAVENOUS at 05:59

## 2020-09-07 RX ADMIN — FUROSEMIDE 20 MG: 10 INJECTION, SOLUTION INTRAMUSCULAR; INTRAVENOUS at 20:01

## 2020-09-07 RX ADMIN — IPRATROPIUM BROMIDE 2 PUFF: 17 AEROSOL, METERED RESPIRATORY (INHALATION) at 10:56

## 2020-09-07 RX ADMIN — ENOXAPARIN SODIUM 40 MG: 40 INJECTION SUBCUTANEOUS at 08:03

## 2020-09-07 RX ADMIN — MIDAZOLAM HYDROCHLORIDE 10 MG/HR: 1 INJECTION, SOLUTION INTRAMUSCULAR; INTRAVENOUS at 15:48

## 2020-09-07 RX ADMIN — THIAMINE HYDROCHLORIDE 100 MG: 100 INJECTION, SOLUTION INTRAMUSCULAR; INTRAVENOUS at 11:08

## 2020-09-07 RX ADMIN — CHLORHEXIDINE GLUCONATE 0.12% ORAL RINSE 15 ML: 1.2 LIQUID ORAL at 20:01

## 2020-09-07 RX ADMIN — IPRATROPIUM BROMIDE 2 PUFF: 17 AEROSOL, METERED RESPIRATORY (INHALATION) at 18:59

## 2020-09-07 RX ADMIN — FAMOTIDINE 20 MG: 10 INJECTION INTRAVENOUS at 20:01

## 2020-09-07 RX ADMIN — FAMOTIDINE 20 MG: 10 INJECTION INTRAVENOUS at 08:03

## 2020-09-07 RX ADMIN — SODIUM CHLORIDE, POTASSIUM CHLORIDE, SODIUM LACTATE AND CALCIUM CHLORIDE 30 ML/HR: 600; 310; 30; 20 INJECTION, SOLUTION INTRAVENOUS at 11:08

## 2020-09-07 RX ADMIN — FUROSEMIDE 40 MG: 10 INJECTION, SOLUTION INTRAMUSCULAR; INTRAVENOUS at 08:03

## 2020-09-07 RX ADMIN — IPRATROPIUM BROMIDE 2 PUFF: 17 AEROSOL, METERED RESPIRATORY (INHALATION) at 07:07

## 2020-09-07 RX ADMIN — ALBUTEROL SULFATE 2 PUFF: 90 AEROSOL, METERED RESPIRATORY (INHALATION) at 10:56

## 2020-09-07 RX ADMIN — FUROSEMIDE 20 MG: 10 INJECTION, SOLUTION INTRAMUSCULAR; INTRAVENOUS at 13:50

## 2020-09-07 RX ADMIN — POTASSIUM CHLORIDE 40 MEQ: 1.5 POWDER, FOR SOLUTION ORAL at 20:01

## 2020-09-07 RX ADMIN — ALBUTEROL SULFATE 2 PUFF: 90 AEROSOL, METERED RESPIRATORY (INHALATION) at 14:52

## 2020-09-07 RX ADMIN — POTASSIUM CHLORIDE 40 MEQ: 1.5 POWDER, FOR SOLUTION ORAL at 08:03

## 2020-09-07 RX ADMIN — VANCOMYCIN HYDROCHLORIDE 1750 MG: 5 INJECTION, POWDER, LYOPHILIZED, FOR SOLUTION INTRAVENOUS at 13:49

## 2020-09-07 RX ADMIN — ALBUTEROL SULFATE 2 PUFF: 90 AEROSOL, METERED RESPIRATORY (INHALATION) at 07:07

## 2020-09-07 RX ADMIN — MIDAZOLAM HYDROCHLORIDE 10 MG/HR: 1 INJECTION, SOLUTION INTRAMUSCULAR; INTRAVENOUS at 11:08

## 2020-09-07 NOTE — PLAN OF CARE
VSS,pt remains intubated/sedated. Pt failed SBT this AM. Sedation turned off and pt was able to respond and follow commands. Trial lasted 20min until patients resp rate increased to 32. Sedation turned back on and pt is resting comfortable. UO increased to 600ml this shift.

## 2020-09-07 NOTE — PLAN OF CARE
Problem: Ventilation, Mechanical Invasive (Adult)  Goal: Signs and Symptoms of Listed Potential Problems Will be Absent, Minimized or Managed (Ventilation, Mechanical Invasive)  Outcome: Ongoing (interventions implemented as appropriate)  Flowsheets (Taken 9/7/2020 7507)  Problems Assessed (Mechanical Ventilation, Invasive): all  Problems Present (Mech Vent, Invasive): situational response   Pt tolerating current vent settings, no signs of distress noted. Will consult with nursing about AM SBT.

## 2020-09-07 NOTE — CONSULTS
Adult Nutrition  Assessment    Patient Name:  Mark Sharma  YOB: 1960  MRN: [id number]  Admit Date:  9/5/2020    Assessment Date:  9/7/2020    Comments:  Pt admitted with dx Respiratory Insufficiency.  Pt currently NPO and intubated.  Receiving Diprivan at 17.1cc/hr which is providing ~ 451 forest/day.  RN reports a small amount of bleed from NG/OG tube which she suspects is from the process of being intubated.  Lab reviewed.  Pt with dx Heart Failure  Will need diet ed prior to discharge.  Consult received regarding Tube Feeding Assessment.  For tube feeding recommend Isosource HN with goal rate of 75cc/hr.    Reason for Assessment     Row Name 09/07/20 1147          Reason for Assessment    Reason For Assessment  physician consult;per organizational policy Low Sodium diet ed; Tube Feeding Assessment      Diagnosis  cardiac disease;other (see comments) dx Heart Failure; Respiratory Insufficiency     Identified At Risk by Screening Criteria  need for education             Labs/Tests/Procedures/Meds     Row Name 09/07/20 1150          Labs/Procedures/Meds    Lab Results Reviewed  reviewed        Medications    Pertinent Medications Reviewed  reviewed         Physical Findings     Row Name 09/07/20 1151          Physical Findings    Overall Physical Appearance  on ventilator support     Tubes  nasogastric tube;oroduodenal tube         Estimated/Assessed Needs     Row Name 09/07/20 1151          Calculation Measurements    Weight Used For Calculations  89.2 kg (196 lb 10.4 oz)        Estimated/Assessed Needs    Additional Documentation  Calorie Requirements (Group);Fluid Requirements (Group);Royal-St. Jeor Equation (Group);Protein Requirements (Group)        Calorie Requirements    Estimated Calorie Requirement (kcal/day)  2617     Estimated Calorie Need Method  Royal-St Jeor        Royal-St. Jeor Equation    RMR (Royal-St. Jeor Equation)  1745.127        Protein Requirements    Weight Used  For Protein Calculations  89.2 kg (196 lb 10.4 oz)     Est Protein Requirement Amount (gms/kg)  1.3 gm protein     Estimated Protein Requirements (gms/day)  115.96        Fluid Requirements    Estimated Fluid Requirements (mL/day)  2676     RDA Method (mL)  2676         Nutrition Prescription Ordered     Row Name 09/07/20 1212          Nutrition Prescription PO    Current PO Diet  NPO                 Electronically signed by:  Glenda Wilson RD  09/07/20 12:13

## 2020-09-07 NOTE — PROGRESS NOTES
[facility] Medicine Services  INPATIENT PROGRESS NOTE    Length of Stay: 2  Date of Admission: 9/5/2020  Primary Care Physician: Tyler Singh MD    Subjective   Chief Complaint: Respiratory failure.    HPI: Patient is seen for follow-up.  He is 59-year-old male with history of nicotine dependence, alcoholism, BPH who presented to the emergency room  with a week history of progressive shortness of breath.  Of note patient was seen by his PCP on 8/22 complaining of 2-week history of epigastric pain.  He had worsening respiratory distress and was urgently intubated.     He remains intubated, sedated and restrained.    Review of Systems  Unable to review as the patient is intubated and sedated.  Objective    Temp:  [98.9 °F (37.2 °C)-99.9 °F (37.7 °C)] 99.3 °F (37.4 °C)  Heart Rate:  [] 80  Resp:  [16-24] 19  BP: (103-127)/(63-80) 120/77  FiO2 (%):  [35 %] 35 %    Vent Settings:  FiO2 (%):  [35 %] 35 %  S RR:  [16-20] 16  PEEP/CPAP (cm H2O):  [5 cm H20] 5 cm H20  OK SUP:  [0 cm H20-8 cm H20] 0 cm H20  MAP (cm H2O):  [9-18] 18    Physical Exam   Constitutional: He appears well-developed and well-nourished. No distress. He is sedated, intubated and restrained.   HENT:   Head: Normocephalic and atraumatic.   Eyes: No scleral icterus.   Neck: Neck supple. No JVD present. No thyromegaly present.   Cardiovascular: Normal rate, regular rhythm and normal heart sounds. Exam reveals no gallop and no friction rub.   No murmur heard.  Pulmonary/Chest: Effort normal. He is intubated. He has decreased breath sounds. He has no wheezes. He has rhonchi. He has no rales. He exhibits no tenderness.   Abdominal: Soft. Bowel sounds are normal. He exhibits no distension and no mass. There is no tenderness. There is no rebound and no guarding.   Musculoskeletal: He exhibits no edema, tenderness or deformity.   Neurological: He exhibits normal muscle tone.   Skin: Skin is warm and dry. No  rash noted. He is not diaphoretic. No erythema. No pallor.   Nursing note and vitals reviewed.        Medication Review:    Current Facility-Administered Medications:   •  albuterol sulfate HFA (PROVENTIL HFA;VENTOLIN HFA;PROAIR HFA) inhaler 2 puff, 2 puff, Inhalation, 4x Daily - RT, Madi Griffith MD, 2 puff at 09/07/20 0707  •  AZITHROMYCIN 500 MG/250 ML 0.9% NS IVPB (vial-mate), 500 mg, Intravenous, Q24H, Urbano Osborn MD, 500 mg at 09/07/20 0632  •  cefTRIAXone (ROCEPHIN) 1 g/100 mL 0.9% NS (MBP), 1 g, Intravenous, Q24H, Urbano Osborn MD, 1 g at 09/07/20 0559  •  chlorhexidine (PERIDEX) 0.12 % solution 15 mL, 15 mL, Mouth/Throat, Q12H, Madi Griffith MD, 15 mL at 09/07/20 0803  •  enoxaparin (LOVENOX) syringe 40 mg, 40 mg, Subcutaneous, Q24H, Urbano Osborn MD, 40 mg at 09/07/20 0803  •  famotidine (PEPCID) injection 20 mg, 20 mg, Intravenous, Q12H, Madi Griffith MD, 20 mg at 09/07/20 0803  •  fentaNYL citrate (PF) (SUBLIMAZE) injection 25 mcg, 25 mcg, Intravenous, Q2H PRN, Iker Parker MD  •  furosemide (LASIX) injection 20 mg, 20 mg, Intravenous, Q6H, Marija Sneed APRN  •  ipratropium (ATROVENT HFA) inhaler 2 puff, 2 puff, Inhalation, 4x Daily - RT, Madi Griffith MD, 2 puff at 09/07/20 0707  •  lactated ringers infusion, 30 mL/hr, Intravenous, Continuous, Marija Sneed APRN, Last Rate: 30 mL/hr at 09/07/20 0921, 30 mL/hr at 09/07/20 0921  •  midazolam (VERSED) 50mg/100mL normal saline infusion, 1-10 mg/hr, Intravenous, Titrated, Urbano Osborn MD, Last Rate: 20 mL/hr at 09/07/20 0351, 10 mg/hr at 09/07/20 0351  •  potassium chloride (KLOR-CON) packet 40 mEq, 40 mEq, Oral, BID, Veronica Gonzalez MD, 40 mEq at 09/07/20 0803  •  propofol (DIPRIVAN) infusion 10 mg/mL 100 mL, 5-50 mcg/kg/min, Intravenous, Titrated, Vic Rodriguez MD, Last Rate: 16.33 mL/hr at 09/06/20  2304, 30 mcg/kg/min at 09/06/20 2304  •  sodium chloride 0.9 % flush 10 mL, 10 mL, Intravenous, PRN, OzVic regalado MD  •  sodium chloride 0.9 % flush 10 mL, 10 mL, Intravenous, Q12H, Urbano Osborn MD, 10 mL at 09/07/20 0803  •  sodium chloride 0.9 % flush 10 mL, 10 mL, Intravenous, PRN, Urbano Osborn MD  •  thiamine (B-1) 100 mg in sodium chloride 0.9 % 100 mL IVPB, 100 mg, Intravenous, Daily, Iker Parker MD    Results Review:  I have reviewed the labs, radiology results, and diagnostic studies.    Laboratory Data:   Results from last 7 days   Lab Units 09/07/20 0322 09/06/20 0219 09/05/20  0437   SODIUM mmol/L 139 140 140   POTASSIUM mmol/L 3.7 3.6 3.4*   CHLORIDE mmol/L 106 105 103   CO2 mmol/L 26.0 25.0 25.0   BUN mg/dL 16 18 12   CREATININE mg/dL 0.88 0.96 1.06   GLUCOSE mg/dL 97 101* 191*   CALCIUM mg/dL 8.0* 8.1* 8.6   BILIRUBIN mg/dL 0.8 0.7 1.5*   ALK PHOS U/L 60 62 81   ALT (SGPT) U/L 17 19 25   AST (SGOT) U/L 23 27 26   ANION GAP mmol/L 7.0 10.0 12.0     Estimated Creatinine Clearance: 114 mL/min (by C-G formula based on SCr of 0.88 mg/dL).  Results from last 7 days   Lab Units 09/07/20 0322 09/06/20 0219   MAGNESIUM mg/dL 2.0 2.0   PHOSPHORUS mg/dL 2.9 4.1         Results from last 7 days   Lab Units 09/07/20  0322 09/06/20 0219 09/05/20  0438   WBC 10*3/mm3 9.98 13.76* 12.29*   HEMOGLOBIN g/dL 13.2 13.6 16.0   HEMATOCRIT % 38.2 37.5 45.5   PLATELETS 10*3/mm3 158 188 256           Culture Data:   Blood Culture   Date Value Ref Range Status   09/06/2020 Abnormal Stain (C)  Preliminary   09/05/2020 No growth at 2 days  Preliminary     No results found for: URINECX  Respiratory Culture   Date Value Ref Range Status   09/05/2020   Final    Rare Normal Respiratory Christen: NO S.aureus/MRSA or Pseudomonas aeruginosa     No results found for: WOUNDCX  No results found for: STOOLCX  No components found for: BODYFLD    Radiology Data:   Imaging Results (Last 24 Hours)     ** No  results found for the last 24 hours. **          ABG:  Results from last 7 days   Lab Units 09/07/20  0707   PH, ARTERIAL pH units 7.464*   PO2 ART mm Hg 73.3*   PCO2, ARTERIAL mm Hg 38.1   HCO3 ART mmol/L 27.3*       I have reviewed the patient's current medications.     Assessment/Plan     Hospital Problem List:  Principal Problem:    Acute respiratory failure with hypoxia (CMS/[facility])  Active Problems:    Aortic stenosis, severe    Acute systolic CHF (congestive heart failure) (CMS/[facility])    Acute respiratory failure with hypoxia (secondary to bilateral infiltrate/acute systolic heart failure): Continue ventilatory support, antimicrobial therapy, inhalers with daily SBT.    Acute systolic congestive heart failure (likely due to severe aortic stenosis): Continue diuretics with strict I's and O's, daily weights, salt and fluid restriction.  Patient will require right heart and left heart catheterization post extubation.  Elevated troponin has been attributed to acute illness.   Repeat chest x-ray in a.m.  Input by cardiologist appreciated.  Echocardiogram done 9/5/2020 showed:  · Left ventricular wall thickness is consistent with mild concentric hypertrophy.  · There is severe calcification of the aortic valve.  · Mild aortic valve regurgitation is present.  · Severe aortic valve stenosis is present.  · EF appears decreased at approx 36-40%.     Sepsis secondary to bilateral pneumonia: Patient did screen positive for sepsis.  Continue IV antibiotics.  Blood culture shows gram-positive cocci in clusters and respiratory culture showed mixed bacterial herlinda.  Reactive leukocytosis has resolved.    Nicotine dependence: Begin nicotine patch.    History of alcoholism: Patient is sedated and not in withdrawal.  Begin prophylactic thiamine.    Nutrition: Consult dietitian for NG tube feeding.    Continue GI and DVT prophylaxis.    Discharge Planning: In progress.    Iker Parker MD   09/07/20   10:31

## 2020-09-07 NOTE — PROGRESS NOTES
"[facility] Progress Note   LOS: 2 days   Patient Care Team:  Tyler Singh MD as PCP - General (Family Medicine)    Chief Complaint:  dyspnea    Subjective     Interval History:   Patient intubated and sedated. Chart reviewed. Spoke to nurse.   I&O is postive overnight. According to RN, he diureses in the AM with the Lasix 40mg but then drops off after that. Will adjust Lasix to aide in more gentle, continuous diuresis to avoid hypovolemia in the setting of severe aortic stenosis. Holding CHF medications one more day.     Objective     Vital Sign Min/Max for last 24 hours  Temp  Min: 98.9 °F (37.2 °C)  Max: 99.9 °F (37.7 °C)   BP  Min: 103/67  Max: 127/74   Pulse  Min: 76  Max: 101   Resp  Min: 16  Max: 24   SpO2  Min: 94 %  Max: 97 %   No data recorded   Weight  Min: 89.2 kg (196 lb 10.4 oz)  Max: 89.2 kg (196 lb 10.4 oz)     Flowsheet Rows      First Filed Value   Admission Height  182.9 cm (72\") Documented at 09/05/2020 0427   Admission Weight  90.7 kg (200 lb) Documented at 09/05/2020 0427            09/05/20  1115 09/06/20  0400 09/07/20  0400   Weight: 91.3 kg (201 lb 4.5 oz) 91.9 kg (202 lb 9.6 oz) 89.2 kg (196 lb 10.4 oz)       Physical Exam:  Physical Exam   Constitutional: He appears well-developed and well-nourished. No distress.   HENT:   Head: Normocephalic.   Eyes: Conjunctivae are normal.   Neck: JVD (12cm) present.   Cardiovascular: Normal rate, regular rhythm, S1 normal, S2 normal, normal heart sounds and intact distal pulses. Exam reveals no gallop and no friction rub.   No murmur heard.  Pulmonary/Chest: Effort normal and breath sounds normal. No respiratory distress. He has no wheezes. He has no rales.   Abdominal: Soft. Bowel sounds are normal. He exhibits no distension.   Musculoskeletal: Normal range of motion. He exhibits no edema.   Skin: Skin is warm and dry. He is not diaphoretic. No erythema.   Psychiatric: He has a normal mood and affect. His behavior is normal.   Nursing note " and vitals reviewed.       Results Review:     Results from last 7 days   Lab Units 09/07/20 0322 09/06/20 0219 09/05/20  0437   SODIUM mmol/L 139 140 140   POTASSIUM mmol/L 3.7 3.6 3.4*   CHLORIDE mmol/L 106 105 103   CO2 mmol/L 26.0 25.0 25.0   BUN mg/dL 16 18 12   CREATININE mg/dL 0.88 0.96 1.06   CALCIUM mg/dL 8.0* 8.1* 8.6   BILIRUBIN mg/dL 0.8 0.7 1.5*   ALK PHOS U/L 60 62 81   ALT (SGPT) U/L 17 19 25   AST (SGOT) U/L 23 27 26   GLUCOSE mg/dL 97 101* 191*       Estimated Creatinine Clearance: 114 mL/min (by C-G formula based on SCr of 0.88 mg/dL).    Results from last 7 days   Lab Units 09/07/20 0322 09/06/20 0219   MAGNESIUM mg/dL 2.0 2.0   PHOSPHORUS mg/dL 2.9 4.1             Results from last 7 days   Lab Units 09/07/20 0322 09/06/20 0219 09/05/20  0438   WBC 10*3/mm3 9.98 13.76* 12.29*   HEMOGLOBIN g/dL 13.2 13.6 16.0   PLATELETS 10*3/mm3 158 188 256           Lab Results   Component Value Date    PROBNP 3,740.0 (H) 09/05/2020       I/O last 3 completed shifts:  In: 5666.3 [I.V.:5066.3; IV Piggyback:600]  Out: 4275 [Urine:3375; Emesis/NG output:900]    Cardiographics:  ECG/EMG Results (last 24 hours)     ** No results found for the last 24 hours. **        Results for orders placed during the hospital encounter of 09/05/20   Adult Transthoracic Echo Complete W/ Cont if Necessary Per Protocol    Narrative · Left ventricular wall thickness is consistent with mild concentric   hypertrophy.  · There is severe calcification of the aortic valve.  · Mild aortic valve regurgitation is present.  · Severe aortic valve stenosis is present.  · EF appears decreased at approx 36-40%.          Xr Chest 1 View    Result Date: 9/5/2020  Extensive pulmonary opacities likely edema and effusions Electronically signed by:  Wally Orona MD  9/5/2020 5:32 AM CDT Workstation: [id number]      Medication Review:     Current Facility-Administered Medications:   •  albuterol sulfate HFA (PROVENTIL HFA;VENTOLIN HFA;PROAIR HFA)  inhaler 2 puff, 2 puff, Inhalation, 4x Daily - RT, Madi Griffith MD, 2 puff at 09/07/20 0707  •  AZITHROMYCIN 500 MG/250 ML 0.9% NS IVPB (vial-mate), 500 mg, Intravenous, Q24H, Urbano Osborn MD, 500 mg at 09/07/20 0632  •  cefTRIAXone (ROCEPHIN) 1 g/100 mL 0.9% NS (MBP), 1 g, Intravenous, Q24H, Urbano Osborn MD, 1 g at 09/07/20 0559  •  chlorhexidine (PERIDEX) 0.12 % solution 15 mL, 15 mL, Mouth/Throat, Q12H, Madi Griffith MD, 15 mL at 09/07/20 0803  •  enoxaparin (LOVENOX) syringe 40 mg, 40 mg, Subcutaneous, Q24H, Urbano Osborn MD, 40 mg at 09/07/20 0803  •  famotidine (PEPCID) injection 20 mg, 20 mg, Intravenous, Q12H, Madi Griffith MD, 20 mg at 09/07/20 0803  •  furosemide (LASIX) injection 40 mg, 40 mg, Intravenous, Daily, CarlosVeronica MD, 40 mg at 09/07/20 0803  •  ipratropium (ATROVENT HFA) inhaler 2 puff, 2 puff, Inhalation, 4x Daily - RT, Madi Griffith MD, 2 puff at 09/07/20 0707  •  lactated ringers infusion, 100 mL/hr, Intravenous, Continuous, Urbano Osborn MD, Last Rate: 100 mL/hr at 09/06/20 2345, 100 mL/hr at 09/06/20 2345  •  midazolam (VERSED) 50mg/100mL normal saline infusion, 1-10 mg/hr, Intravenous, Titrated, Urbano Osborn MD, Last Rate: 20 mL/hr at 09/07/20 0351, 10 mg/hr at 09/07/20 0351  •  norepinephrine (LEVOPHED) 8 mg/250 mL (32 mcg/mL) in sodium chloride 0.9% infusion (premix), 0.02-0.3 mcg/kg/min, Intravenous, Titrated, Vic Rodriguez MD, Stopped at 09/05/20 1045  •  potassium chloride (KLOR-CON) packet 40 mEq, 40 mEq, Oral, BID, Veronica Gonzalez MD, 40 mEq at 09/07/20 0803  •  propofol (DIPRIVAN) infusion 10 mg/mL 100 mL, 5-50 mcg/kg/min, Intravenous, Titrated, Vic Rodriguez MD, Last Rate: 16.33 mL/hr at 09/06/20 2304, 30 mcg/kg/min at 09/06/20 2304  •  sodium chloride 0.9 % flush 10 mL, 10 mL, Intravenous, PRN, Vic Rodriguez,  MD  •  sodium chloride 0.9 % flush 10 mL, 10 mL, Intravenous, Q12H, Urbano Osborn MD, 10 mL at 09/07/20 0803  •  sodium chloride 0.9 % flush 10 mL, 10 mL, Intravenous, PRN, Urbano Osborn MD    Assessment/Plan       Acute respiratory failure with hypoxia (CMS/HCC)  - vent management per Hospitalist team.        Acute systolic CHF (congestive heart failure) (CMS/HCC)  first presentation of moderate systolic heart failure. Etiology: NICM/ICM. LVEF 36%.  NYHA stage C. FC-IV.   Patient's fluid overload, LV dysfunction is all secondary to aortic stenoses.  The patient I think is probably over diuresis and therefore why his blood pressure is low.    Most feel that ACE and arms are not indicated in a patient with the severity of aortic stenosis due to the pressure difference between the left ventricle and the aorta.  Patient is currently Patient is currently volume overloaded. and with  Moderate perfusion. The patient's hemodynamics are currently acceptable.   -BB:  On hold at this time. -ACE/ARB/ENTRESTO: on hold for hemodynamics  - DIGOXIN: n/a  - IMDUR/HYDRALAZINE: n/a  -DIURETIC: Change lasix to 20mg q6hr IV for better continues diuresis. BP still soft with Versed and Proprofol   -MRA: The patient is FC-NYHA Class IV and MRA is indicated. On hold for hemodynamics    -Sodium restriction: 2000mg day   -ICD: Not indicated as the patient has severe aortic stenosis and LV function should return to normal once this is fixed.  -AHF: not indicated  -LVAD: not indicated    - will need MARGO and LHC at some point when more medically stable to continue work up for aortic stenosis. His CHRISSY is 0.74.   - cut back on IVFs to KVO. Less likely a sepsis component and more acute CHF. I&O was    - Hold TF until tomorrow to avoid more volume during diureses.          Aortic stenosis, severe  - will need RHC /LHC to complete work up. This is not done during his acute decompensation. Will await improvement in his course and proceed.  Further orders to come.         Plan for disposition:Where: Continue current location     Patient seen and examined and changes were made according to above.          This document has been electronically signed by FRANCISCA Denney on September 7, 2020 08:16

## 2020-09-07 NOTE — PLAN OF CARE
Problem: Patient Care Overview  Goal: Plan of Care Review  Outcome: Ongoing (interventions implemented as appropriate)  Flowsheets (Taken 9/7/2020 1226)  Progress: no change  Plan of Care Reviewed With: other (see comments) (RN)  Outcome Summary: initial assessment  Note:   Initiate tube feeding as appropriate.

## 2020-09-07 NOTE — PROGRESS NOTES
"Pharmacokinetics by Pharmacy - Vancomycin Initial Consult    Mark Sharma is a 59 y.o. male being initiated on vancomycin for bacteremia. Patient is also receiving azithromycin.    Objective:     [Ht: 182.9 cm (72.01\"); Wt: 89.2 kg (196 lb 10.4 oz)]     Lab Results   Component Value Date    WBC 9.98 09/07/2020    WBC 13.76 (H) 09/06/2020    WBC 12.29 (H) 09/05/2020    WBC 8.6 07/15/2019    WBC 9.2 07/16/2018      Lab Results   Component Value Date    LACTATE 1.0 09/05/2020    LACTATE 2.2 (C) 09/05/2020      Temp Readings from Last 1 Encounters:   09/07/20 99.1 °F (37.3 °C) (Axillary)     Estimated Creatinine Clearance: 114 mL/min (by C-G formula based on SCr of 0.88 mg/dL).   Lab Results   Component Value Date    CREATININE 0.88 09/07/2020    CREATININE 0.96 09/06/2020    CREATININE 1.06 09/05/2020       Baseline culture results:  Microbiology Results (last 10 days)       Procedure Component Value - Date/Time    Blood Culture - Blood, Hand, Right [[id number]]  (Abnormal) Collected:  09/06/20 0545    Lab Status:  Preliminary result Specimen:  Blood from Hand, Right Updated:  09/07/20 0936     Blood Culture Abnormal Stain     Gram Stain Aerobic Bottle Gram positive cocci in clusters     Comment: 1 positive for gram positive cocci of 4 drawn       Blood Culture ID, PCR - Blood, Hand, Right [[id number]]  (Abnormal) Collected:  09/06/20 0545    Lab Status:  Final result Specimen:  Blood from Hand, Right Updated:  09/07/20 0936     BCID, PCR Staphylococcus species, not aureus. mecA (methicillin resistance gene) detected. Identification by BCID PCR.    Respiratory Culture - Sputum, NT Suction [[id number]] Collected:  09/05/20 1610    Lab Status:  Final result Specimen:  Sputum from NT Suction Updated:  09/07/20 0651     Respiratory Culture Rare Normal Respiratory Christen: NO S.aureus/MRSA or Pseudomonas aeruginosa     Gram Stain Moderate (3+) WBCs per low power field      Rare (1+) Epithelial cells per low power field    "   Mixed bacterial herlinda    COVID PRE-OP / PRE-PROCEDURE SCREENING ORDER (NO ISOLATION) - Swab, Nasopharynx [[id number]] Collected:  09/05/20 0640    Lab Status:  Final result Specimen:  Swab from Nasopharynx Updated:  09/05/20 1003    Narrative:       The following orders were created for panel order COVID PRE-OP / PRE-PROCEDURE SCREENING ORDER (NO ISOLATION) - Swab, Nasopharynx.  Procedure                               Abnormality         Status                     ---------                               -----------         ------                     COVID-19, [facility] IN-HOUS...[[id number]]  Normal              Final result                 Please view results for these tests on the individual orders.    COVID-19, [facility] IN-HOUSE, NP SWAB IN TRANSPORT MEDIA 8-10 HR TAT - Swab, Nasopharynx [[id number]]  (Normal) Collected:  09/05/20 0640    Lab Status:  Final result Specimen:  Swab from Nasopharynx Updated:  09/05/20 1003     COVID19 Not Detected    Narrative:       Testing performed by Real Time RT-PCR  This test has not been approved by the [employer] but is authorized under the Emergency Use Act (EUA)    Fact sheet for providers: https://www.fda.gov/media/338706/download    Fact sheet for patients: https://www.fda.gov/media/368041/download        Blood Culture - Blood, Arm, Left [[id number]] Collected:  09/05/20 0525    Lab Status:  Preliminary result Specimen:  Blood from Arm, Left Updated:  09/07/20 0545     Blood Culture No growth at 2 days          Respiratory Culture   Date Value Ref Range Status   09/05/2020   Final    Rare Normal Respiratory Herlinda: NO S.aureus/MRSA or Pseudomonas aeruginosa         Assessment  WBC trended into normal limits today  SCr WNL  Afebrile    Labs in progress:  9/6 BCx1 1/4 bottles gram positive cocci clusters / PCR staph spp. Not aureus, mecA    Vancomycin 1500 mg IV q12h gives estimated AUC of 484, trough of 11.  Using actual weight.  CrCl >100 ml/min.    Will follow cultures    Utilizing  AUC dosing   Goal AUC for bacteremia: 400-600    Plan  1. Give vancomycin 1750mg IV x 1 followed by vancomycin 1500mg IV Q12H  2. Will order vancomycin peak 9/8 1600 and vancomycin trough 9/9 0100  3. Pharmacy will monitor renal function and adjust dose accordingly.    Magdiel Langston, PharmD  09/07/20 13:11

## 2020-09-07 NOTE — PLAN OF CARE
Problem: Ventilation, Mechanical Invasive (Adult)  Goal: Signs and Symptoms of Listed Potential Problems Will be Absent, Minimized or Managed (Ventilation, Mechanical Invasive)  Outcome: Ongoing (interventions implemented as appropriate)     Problem: Ventilation, Mechanical Invasive (Adult)  Intervention: Prevent Airway Displacement/Mechanical Dysfunction  Flowsheets (Taken 9/7/2020 1652)  Airway Safety Measures: manual resuscitator/mask/valve in room; suction at bedside  Intervention: Prevent Airway-Related Skin/Tissue Breakdown  Flowsheets (Taken 9/7/2020 1652)  Device Skin Pressure Protection: skin-to-device areas padded  Intervention: Prevent Ventilator-Induced Lung Injury  Flowsheets (Taken 9/7/2020 1652)  Lung Protection Measures: lung compliance monitored; optimal PEEP applied; plateau/inspiratory pressure monitored

## 2020-09-08 ENCOUNTER — APPOINTMENT (OUTPATIENT)
Dept: GENERAL RADIOLOGY | Facility: HOSPITAL | Age: 60
End: 2020-09-08

## 2020-09-08 LAB
ALBUMIN SERPL-MCNC: 3.3 G/DL (ref 3.5–5.2)
ALBUMIN/GLOB SERPL: 1.4 G/DL
ALP SERPL-CCNC: 67 U/L (ref 39–117)
ALT SERPL W P-5'-P-CCNC: 21 U/L (ref 1–41)
ANION GAP SERPL CALCULATED.3IONS-SCNC: 7 MMOL/L (ref 5–15)
ARTERIAL PATENCY WRIST A: ABNORMAL
ARTERIAL PATENCY WRIST A: POSITIVE
AST SERPL-CCNC: 24 U/L (ref 1–40)
ATMOSPHERIC PRESS: 747 MMHG
ATMOSPHERIC PRESS: 748 MMHG
BACTERIA SPEC AEROBE CULT: ABNORMAL
BASE EXCESS BLDA CALC-SCNC: 1.4 MMOL/L (ref 0–2)
BASE EXCESS BLDA CALC-SCNC: 3.1 MMOL/L (ref 0–2)
BASOPHILS # BLD AUTO: 0.04 10*3/MM3 (ref 0–0.2)
BASOPHILS NFR BLD AUTO: 0.4 % (ref 0–1.5)
BDY SITE: ABNORMAL
BDY SITE: ABNORMAL
BILIRUB SERPL-MCNC: 1 MG/DL (ref 0–1.2)
BUN SERPL-MCNC: 17 MG/DL (ref 6–20)
BUN/CREAT SERPL: 20.5 (ref 7–25)
CALCIUM SPEC-SCNC: 8.3 MG/DL (ref 8.6–10.5)
CHLORIDE SERPL-SCNC: 106 MMOL/L (ref 98–107)
CO2 SERPL-SCNC: 27 MMOL/L (ref 22–29)
CREAT SERPL-MCNC: 0.83 MG/DL (ref 0.76–1.27)
DEPRECATED RDW RBC AUTO: 43.4 FL (ref 37–54)
EOSINOPHIL # BLD AUTO: 0.16 10*3/MM3 (ref 0–0.4)
EOSINOPHIL NFR BLD AUTO: 1.7 % (ref 0.3–6.2)
ERYTHROCYTE [DISTWIDTH] IN BLOOD BY AUTOMATED COUNT: 12.4 % (ref 12.3–15.4)
GAS FLOW AIRWAY: 2 LPM
GFR SERPL CREATININE-BSD FRML MDRD: 95 ML/MIN/1.73
GLOBULIN UR ELPH-MCNC: 2.3 GM/DL
GLUCOSE SERPL-MCNC: 119 MG/DL (ref 65–99)
GRAM STN SPEC: ABNORMAL
HCO3 BLDA-SCNC: 24.3 MMOL/L (ref 20–26)
HCO3 BLDA-SCNC: 26.6 MMOL/L (ref 20–26)
HCT VFR BLD AUTO: 41 % (ref 37.5–51)
HGB BLD-MCNC: 14.2 G/DL (ref 13–17.7)
IMM GRANULOCYTES # BLD AUTO: 0.03 10*3/MM3 (ref 0–0.05)
IMM GRANULOCYTES NFR BLD AUTO: 0.3 % (ref 0–0.5)
INHALED O2 CONCENTRATION: 35 %
ISOLATED FROM: ABNORMAL
LYMPHOCYTES # BLD AUTO: 1.48 10*3/MM3 (ref 0.7–3.1)
LYMPHOCYTES NFR BLD AUTO: 16.1 % (ref 19.6–45.3)
Lab: ABNORMAL
Lab: ABNORMAL
MAGNESIUM SERPL-MCNC: 2.1 MG/DL (ref 1.6–2.6)
MCH RBC QN AUTO: 33 PG (ref 26.6–33)
MCHC RBC AUTO-ENTMCNC: 34.6 G/DL (ref 31.5–35.7)
MCV RBC AUTO: 95.3 FL (ref 79–97)
MODALITY: ABNORMAL
MODALITY: ABNORMAL
MONOCYTES # BLD AUTO: 0.83 10*3/MM3 (ref 0.1–0.9)
MONOCYTES NFR BLD AUTO: 9 % (ref 5–12)
NEUTROPHILS NFR BLD AUTO: 6.68 10*3/MM3 (ref 1.7–7)
NEUTROPHILS NFR BLD AUTO: 72.5 % (ref 42.7–76)
NRBC BLD AUTO-RTO: 0 /100 WBC (ref 0–0.2)
NT-PROBNP SERPL-MCNC: 4314 PG/ML (ref 0–900)
PCO2 BLDA: 32.8 MM HG (ref 35–45)
PCO2 BLDA: 36.4 MM HG (ref 35–45)
PEEP RESPIRATORY: 5 CM[H2O]
PH BLDA: 7.47 PH UNITS (ref 7.35–7.45)
PH BLDA: 7.48 PH UNITS (ref 7.35–7.45)
PHOSPHATE SERPL-MCNC: 4.1 MG/DL (ref 2.5–4.5)
PLATELET # BLD AUTO: 174 10*3/MM3 (ref 140–450)
PMV BLD AUTO: 11.3 FL (ref 6–12)
PO2 BLDA: 104 MM HG (ref 83–108)
PO2 BLDA: 75.1 MM HG (ref 83–108)
POTASSIUM SERPL-SCNC: 3.8 MMOL/L (ref 3.5–5.2)
PROT SERPL-MCNC: 5.6 G/DL (ref 6–8.5)
PSV: 8 CMH2O
RBC # BLD AUTO: 4.3 10*6/MM3 (ref 4.14–5.8)
SAO2 % BLDCOA: 96 % (ref 94–99)
SAO2 % BLDCOA: 98.8 % (ref 94–99)
SODIUM SERPL-SCNC: 140 MMOL/L (ref 136–145)
VENTILATOR MODE: ABNORMAL
VENTILATOR MODE: ABNORMAL
WBC # BLD AUTO: 9.22 10*3/MM3 (ref 3.4–10.8)

## 2020-09-08 PROCEDURE — 94799 UNLISTED PULMONARY SVC/PX: CPT

## 2020-09-08 PROCEDURE — 97162 PT EVAL MOD COMPLEX 30 MIN: CPT

## 2020-09-08 PROCEDURE — 25010000002 THIAMINE PER 100 MG: Performed by: INTERNAL MEDICINE

## 2020-09-08 PROCEDURE — 25010000002 MIDAZOLAM PER 1 MG: Performed by: INTERNAL MEDICINE

## 2020-09-08 PROCEDURE — 25010000002 VANCOMYCIN 5 G RECONSTITUTED SOLUTION: Performed by: INTERNAL MEDICINE

## 2020-09-08 PROCEDURE — 25010000002 FUROSEMIDE PER 20 MG: Performed by: INTERNAL MEDICINE

## 2020-09-08 PROCEDURE — 25010000002 FUROSEMIDE PER 20 MG: Performed by: NURSE PRACTITIONER

## 2020-09-08 PROCEDURE — 25010000002 PROPOFOL 10 MG/ML EMULSION: Performed by: FAMILY MEDICINE

## 2020-09-08 PROCEDURE — 25010000002 CEFTRIAXONE PER 250 MG: Performed by: INTERNAL MEDICINE

## 2020-09-08 PROCEDURE — 94640 AIRWAY INHALATION TREATMENT: CPT

## 2020-09-08 PROCEDURE — 83880 ASSAY OF NATRIURETIC PEPTIDE: CPT | Performed by: NURSE PRACTITIONER

## 2020-09-08 PROCEDURE — 36600 WITHDRAWAL OF ARTERIAL BLOOD: CPT

## 2020-09-08 PROCEDURE — 25010000002 ENOXAPARIN PER 10 MG: Performed by: INTERNAL MEDICINE

## 2020-09-08 PROCEDURE — 84100 ASSAY OF PHOSPHORUS: CPT | Performed by: HOSPITALIST

## 2020-09-08 PROCEDURE — 83735 ASSAY OF MAGNESIUM: CPT | Performed by: HOSPITALIST

## 2020-09-08 PROCEDURE — 82803 BLOOD GASES ANY COMBINATION: CPT

## 2020-09-08 PROCEDURE — 85025 COMPLETE CBC W/AUTO DIFF WBC: CPT | Performed by: HOSPITALIST

## 2020-09-08 PROCEDURE — 80053 COMPREHEN METABOLIC PANEL: CPT | Performed by: HOSPITALIST

## 2020-09-08 PROCEDURE — 25010000002 AZITHROMYCIN PER 500 MG: Performed by: INTERNAL MEDICINE

## 2020-09-08 PROCEDURE — 99232 SBSQ HOSP IP/OBS MODERATE 35: CPT | Performed by: INTERNAL MEDICINE

## 2020-09-08 PROCEDURE — 71045 X-RAY EXAM CHEST 1 VIEW: CPT

## 2020-09-08 PROCEDURE — 92610 EVALUATE SWALLOWING FUNCTION: CPT | Performed by: SPEECH-LANGUAGE PATHOLOGIST

## 2020-09-08 PROCEDURE — 94003 VENT MGMT INPAT SUBQ DAY: CPT

## 2020-09-08 RX ORDER — LORAZEPAM 2 MG/ML
4 INJECTION INTRAMUSCULAR
Status: DISCONTINUED | OUTPATIENT
Start: 2020-09-08 | End: 2020-09-11 | Stop reason: HOSPADM

## 2020-09-08 RX ORDER — BISOPROLOL FUMARATE 5 MG/1
2.5 TABLET ORAL
Status: DISCONTINUED | OUTPATIENT
Start: 2020-09-08 | End: 2020-09-11 | Stop reason: HOSPADM

## 2020-09-08 RX ORDER — LORAZEPAM 2 MG/ML
2 INJECTION INTRAMUSCULAR
Status: DISCONTINUED | OUTPATIENT
Start: 2020-09-08 | End: 2020-09-11 | Stop reason: HOSPADM

## 2020-09-08 RX ORDER — IPRATROPIUM BROMIDE AND ALBUTEROL SULFATE 2.5; .5 MG/3ML; MG/3ML
3 SOLUTION RESPIRATORY (INHALATION)
Status: DISCONTINUED | OUTPATIENT
Start: 2020-09-08 | End: 2020-09-11 | Stop reason: HOSPADM

## 2020-09-08 RX ORDER — LORAZEPAM 2 MG/1
2 TABLET ORAL
Status: DISCONTINUED | OUTPATIENT
Start: 2020-09-08 | End: 2020-09-11 | Stop reason: HOSPADM

## 2020-09-08 RX ORDER — BUDESONIDE AND FORMOTEROL FUMARATE DIHYDRATE 160; 4.5 UG/1; UG/1
2 AEROSOL RESPIRATORY (INHALATION)
Status: DISCONTINUED | OUTPATIENT
Start: 2020-09-08 | End: 2020-09-11 | Stop reason: HOSPADM

## 2020-09-08 RX ORDER — LORAZEPAM 2 MG/ML
1 INJECTION INTRAMUSCULAR
Status: DISCONTINUED | OUTPATIENT
Start: 2020-09-08 | End: 2020-09-11 | Stop reason: HOSPADM

## 2020-09-08 RX ORDER — FUROSEMIDE 10 MG/ML
40 INJECTION INTRAMUSCULAR; INTRAVENOUS EVERY 12 HOURS
Status: DISCONTINUED | OUTPATIENT
Start: 2020-09-08 | End: 2020-09-10

## 2020-09-08 RX ORDER — LORAZEPAM 0.5 MG/1
1 TABLET ORAL
Status: DISCONTINUED | OUTPATIENT
Start: 2020-09-08 | End: 2020-09-11 | Stop reason: HOSPADM

## 2020-09-08 RX ORDER — LORAZEPAM 2 MG/1
4 TABLET ORAL
Status: DISCONTINUED | OUTPATIENT
Start: 2020-09-08 | End: 2020-09-11 | Stop reason: HOSPADM

## 2020-09-08 RX ORDER — FUROSEMIDE 10 MG/ML
40 INJECTION INTRAMUSCULAR; INTRAVENOUS EVERY 12 HOURS
Status: DISCONTINUED | OUTPATIENT
Start: 2020-09-08 | End: 2020-09-08

## 2020-09-08 RX ADMIN — FUROSEMIDE 40 MG: 10 INJECTION, SOLUTION INTRAMUSCULAR; INTRAVENOUS at 08:18

## 2020-09-08 RX ADMIN — NICOTINE 1 PATCH: 14 PATCH, EXTENDED RELEASE TRANSDERMAL at 08:11

## 2020-09-08 RX ADMIN — AZITHROMYCIN MONOHYDRATE 500 MG: 500 INJECTION, POWDER, LYOPHILIZED, FOR SOLUTION INTRAVENOUS at 05:30

## 2020-09-08 RX ADMIN — FUROSEMIDE 40 MG: 10 INJECTION, SOLUTION INTRAMUSCULAR; INTRAVENOUS at 20:12

## 2020-09-08 RX ADMIN — THIAMINE HYDROCHLORIDE 100 MG: 100 INJECTION, SOLUTION INTRAMUSCULAR; INTRAVENOUS at 08:10

## 2020-09-08 RX ADMIN — FAMOTIDINE 20 MG: 10 INJECTION INTRAVENOUS at 08:10

## 2020-09-08 RX ADMIN — MIDAZOLAM HYDROCHLORIDE 10 MG/HR: 1 INJECTION, SOLUTION INTRAMUSCULAR; INTRAVENOUS at 02:56

## 2020-09-08 RX ADMIN — IPRATROPIUM BROMIDE 2 PUFF: 17 AEROSOL, METERED RESPIRATORY (INHALATION) at 07:27

## 2020-09-08 RX ADMIN — FUROSEMIDE 20 MG: 10 INJECTION, SOLUTION INTRAMUSCULAR; INTRAVENOUS at 01:49

## 2020-09-08 RX ADMIN — CHLORHEXIDINE GLUCONATE 0.12% ORAL RINSE 15 ML: 1.2 LIQUID ORAL at 08:10

## 2020-09-08 RX ADMIN — PROPOFOL 20 MCG/KG/MIN: 10 INJECTION, EMULSION INTRAVENOUS at 02:56

## 2020-09-08 RX ADMIN — IPRATROPIUM BROMIDE AND ALBUTEROL SULFATE 3 ML: 2.5; .5 SOLUTION RESPIRATORY (INHALATION) at 10:34

## 2020-09-08 RX ADMIN — IPRATROPIUM BROMIDE AND ALBUTEROL SULFATE 3 ML: 2.5; .5 SOLUTION RESPIRATORY (INHALATION) at 14:15

## 2020-09-08 RX ADMIN — ENOXAPARIN SODIUM 40 MG: 40 INJECTION SUBCUTANEOUS at 08:10

## 2020-09-08 RX ADMIN — Medication 2.5 MG: at 10:23

## 2020-09-08 RX ADMIN — SODIUM CHLORIDE, POTASSIUM CHLORIDE, SODIUM LACTATE AND CALCIUM CHLORIDE 30 ML/HR: 600; 310; 30; 20 INJECTION, SOLUTION INTRAVENOUS at 23:15

## 2020-09-08 RX ADMIN — ALBUTEROL SULFATE 2 PUFF: 90 AEROSOL, METERED RESPIRATORY (INHALATION) at 07:27

## 2020-09-08 RX ADMIN — FAMOTIDINE 20 MG: 10 INJECTION INTRAVENOUS at 20:12

## 2020-09-08 RX ADMIN — CEFTRIAXONE SODIUM 1 G: 1 INJECTION, POWDER, FOR SOLUTION INTRAMUSCULAR; INTRAVENOUS at 12:43

## 2020-09-08 RX ADMIN — SODIUM CHLORIDE, PRESERVATIVE FREE 10 ML: 5 INJECTION INTRAVENOUS at 20:13

## 2020-09-08 RX ADMIN — VANCOMYCIN HYDROCHLORIDE 1500 MG: 5 INJECTION, POWDER, LYOPHILIZED, FOR SOLUTION INTRAVENOUS at 01:49

## 2020-09-08 RX ADMIN — CHLORHEXIDINE GLUCONATE 0.12% ORAL RINSE 15 ML: 1.2 LIQUID ORAL at 20:12

## 2020-09-08 NOTE — PLAN OF CARE
Problem: Patient Care Overview  Goal: Plan of Care Review  Outcome: Ongoing (interventions implemented as appropriate)  Flowsheets (Taken 9/8/2020 0363)  Progress: improving  Plan of Care Reviewed With: patient  Outcome Summary: Swallow: WFL. No s/s of aspiration for regular liquids and solids.  Pt's dentures not available but friend to bring in tomorrow therefore SLP will initiate soft diet with chopped meat until dentures arrive. Will upgrade to regular when dentures available.

## 2020-09-08 NOTE — THERAPY EVALUATION
Patient Name: Mark Sharma  : 1960    MRN: [id number]                              Today's Date: 2020       Admit Date: 2020    Visit Dx:     ICD-10-CM ICD-9-CM   1. Acute respiratory failure with hypoxia (CMS/HCC) J96.01 518.81   2. Pneumonia due to organism J18.9 486   3. COPD exacerbation (CMS/[facility]) J44.1 491.21   4. Dysphagia, unspecified type R13.10 787.20   5. Impaired functional mobility, balance, gait, and endurance Z74.09 V49.89     Patient Active Problem List   Diagnosis   • Acute respiratory failure with hypoxia (CMS/[facility])   • Aortic stenosis, severe   • Acute systolic CHF (congestive heart failure) (CMS/[facility])     Past Medical History:   Diagnosis Date   • Hypertension      Past Surgical History:   Procedure Laterality Date   • APPENDECTOMY     • COLONOSCOPY N/A 2019    Procedure: COLONOSCOPY;  Surgeon: Dion Ambrocio DO;  Location: [facility] ENDOSCOPY;  Service: Gastroenterology     General Information     Row Name 20 1450          PT Evaluation Time/Intention    Document Type  evaluation  -LR     Mode of Treatment  individual therapy;physical therapy  -LR     Row Name 20 1450          General Information    Patient Profile Reviewed?  yes  -LR     Prior Level of Function  independent:;all household mobility;community mobility;gait;transfer;bed mobility;ADL's  -LR     Existing Precautions/Restrictions  fall  -LR     Row Name 20 1450          Relationship/Environment    Lives With  significant other  -LR     Row Name 20 1450          Resource/Environmental Concerns    Current Living Arrangements  home/apartment/condo  -LR     Row Name 20 1450          Home Main Entrance    Number of Stairs, Main Entrance  other (see comments) Ramp  -LR     Row Name 20 1450          Stairs Within Home, Primary    Number of Stairs, Within Home, Primary  none  -LR     Row Name 20 1450          Cognitive Assessment/Intervention- PT/OT    Orientation Status  (Cognition)  oriented x 4  -LR     Row Name 09/08/20 1450          Safety Issues, Functional Mobility    Safety Issues Affecting Function (Mobility)  ability to follow commands;awareness of need for assistance;at risk behavior observed;safety precaution awareness;safety precautions follow-through/compliance  -LR     Impairments Affecting Function (Mobility)  balance;coordination;endurance/activity tolerance;strength;shortness of breath  -LR       User Key  (r) = Recorded By, (t) = Taken By, (c) = Cosigned By    Initials Name Provider Type    LR Bhupinder Gasca Physical Therapist        Mobility     Row Name 09/08/20 1450          Bed Mobility Assessment/Treatment    Bed Mobility Assessment/Treatment  supine-sit  -LR     Supine-Sit [place] (Bed Mobility)  contact guard  -LR     Assistive Device (Bed Mobility)  bed rails;head of bed elevated  -LR     Row Name 09/08/20 1450          Bed-Chair Transfer    Bed-Chair [place] (Transfers)  minimum assist (75% patient effort)  -LR     Row Name 09/08/20 1450          Sit-Stand Transfer    Sit-Stand [place] (Transfers)  contact guard Multiple attempts  -LR     Row Name 09/08/20 1450          Gait/Stairs Assessment/Training    Gait/Stairs Assessment/Training  gait/ambulation independence;gait/ambulation assistive device  -LR     [place] Level (Gait)  minimum assist (75% patient effort)  -LR     Distance in Feet (Gait)  5'x1  -LR       User Key  (r) = Recorded By, (t) = Taken By, (c) = Cosigned By    Initials Name Provider Type    Bhupinder Fierro Physical Therapist        Obj/Interventions     Row Name 09/08/20 1518          General ROM    GENERAL ROM COMMENTS  BLE grossly WFL  -LR     Row Name 09/08/20 1518          MMT (Manual Muscle Testing)    General MMT Comments  BLE grossly 4+/5, Bilateral knee buckling instanding initally  -LR     Row Name 09/08/20 1518          Static Sitting Balance    Level of [place] (Unsupported Sitting, Static Balance)   supervision  -LR     Sitting Position (Unsupported Sitting, Static Balance)  sitting on edge of bed  -LR     Time Able to Maintain Position (Unsupported Sitting, Static Balance)  more than 5 minutes  -     Row Name 09/08/20 1518          Dynamic Standing Balance    Level of [place], Reaches Outside Midline (Standing, Dynamic Balance)  minimal assist, 75% patient effort;contact guard assist  -LR     Time Able to Maintain Position, Reaches Outside Midline (Standing, Dynamic Balance)  1 to 2 minutes  -     Row Name 09/08/20 1518          Sensory Assessment/Intervention    Sensory General Assessment  no sensation deficits identified  -LR       User Key  (r) = Recorded By, (t) = Taken By, (c) = Cosigned By    Initials Name Provider Type    LR Bhupinder Gasca Physical Therapist        Goals/Plan     Row Name 09/08/20 1450          Bed Mobility Goal 1 (PT)    Activity/Assistive Device (Bed Mobility Goal 1, PT)  sit to supine;supine to sit  -LR     [place] Level/Cues Needed (Bed Mobility Goal 1, PT)  independent  -LR     Time Frame (Bed Mobility Goal 1, PT)  by discharge  -LR     Progress/Outcomes (Bed Mobility Goal 1, PT)  goal not met  -     Row Name 09/08/20 1450          Transfer Goal 1 (PT)    Activity/Assistive Device (Transfer Goal 1, PT)  sit-to-stand/stand-to-sit;bed-to-chair/chair-to-bed  -LR     [place] Level/Cues Needed (Transfer Goal 1, PT)  independent  -LR     Time Frame (Transfer Goal 1, PT)  by discharge  -LR     Progress/Outcome (Transfer Goal 1, PT)  goal not met  -     Row Name 09/08/20 1450          Gait Training Goal 1 (PT)    Activity/Assistive Device (Gait Training Goal 1, PT)  gait (walking locomotion);assistive device use;backward stepping  -LR     [place] Level (Gait Training Goal 1, PT)  independent  -LR     Distance (Gait Goal 1, PT)  300'x2  -LR     Time Frame (Gait Training Goal 1, PT)  by discharge  -LR     Progress/Outcome (Gait Training Goal 1, PT)  goal not met   -LR     Row Name 09/08/20 1452          Patient Education Goal (PT)    Activity (Patient Education Goal, PT)  Patient will improve Tinetti balance and gait assessment to >24/28 to indicate low fall risk.  -LR     [place]/Cues/Accuracy (Memory Goal 2, PT)  demonstrates adequately  -LR     Time Frame (Patient Education Goal, PT)  by discharge  -LR     Progress/Outcome (Patient Education Goal, PT)  goal not met  -LR       User Key  (r) = Recorded By, (t) = Taken By, (c) = Cosigned By    Initials Name Provider Type    LR Bhupinder Gacsa Physical Therapist        Clinical Impression     Row Name 09/08/20 6974          Pain Scale: Numbers Pre/Post-Treatment    Pain Scale: Numbers, Pretreatment  0/10 - no pain  -LR     Pain Scale: Numbers, Post-Treatment  0/10 - no pain  -LR     Row Name 09/08/20 8110          Plan of Care Review    Plan of Care Reviewed With  patient;significant other  -LR     Outcome Summary  PT eval completed on this date. Patient motivated to return home and get back to being independent. Bed mobility: CGA for supine>sit transfer Transfer: CGA for sit<>Stand transfer to improve forward trunk lean and steadiness MinAx1 for bed>chair transfer with HHA. Gait: Patient ambulated 5'x1 with HHA. Balance: Tinetti balance and gait completed 17/28 which indicates high fall risk. Patient would benefit from skilled PT to address deficits in functional mobility. Recommend Home with assist and HHPT.   -LR     Row Name 09/08/20 9595          Physical Therapy Clinical Impression    Patient/Family Goals Statement (PT Clinical Impression)  Patient wants to return to [facility]  -LR     Criteria for Skilled Interventions Met (PT Clinical Impression)  yes;treatment indicated  -LR     Rehab Potential (PT Clinical Summary)  good, to achieve stated therapy goals  -LR     Predicted Duration of Therapy (PT)  Until d/c or until all goals met.  -LR     Row Name 09/08/20 6692          Vital Signs    Pre Systolic BP Rehab  129  -LR      Pre Treatment Diastolic BP  84  -LR     Intra Systolic BP Rehab  151  -LR     Intra Treatment Diastolic BP  95  -LR     Post Systolic BP Rehab  141  -LR     Post Treatment Diastolic BP  91  -LR     Pretreatment Heart Rate (beats/min)  94  -LR     Intratreatment Heart Rate (beats/min)  108  -LR     Pre SpO2 (%)  100  -LR     O2 Delivery Pre Treatment  room air  -LR     Intra SpO2 (%)  94  -LR     O2 Delivery Intra Treatment  room air  -LR     Post SpO2 (%)  96  -LR     O2 Delivery Post Treatment  room air  -LR     Pre Patient Position  Supine  -LR     Intra Patient Position  Sitting post standing  -LR     Post Patient Position  Sitting  -LR     Row Name 09/08/20 6734          Positioning and Restraints    Pre-Treatment Position  in bed  -LR     Post Treatment Position  chair  -LR     In Chair  notified nsg;with family/caregiver;call light within reach;encouraged to call for assist  -LR       User Key  (r) = Recorded By, (t) = Taken By, (c) = Cosigned By    Initials Name Provider Type    LR Bhupinder Gasca Physical Therapist        Outcome Measures     Row Name 09/08/20 6639          How much help from another person do you currently need...    Turning from your back to your side while in flat bed without using bedrails?  3  -LR     Moving from lying on back to sitting on the side of a flat bed without bedrails?  3  -LR     Moving to and from a bed to a chair (including a wheelchair)?  3  -LR     Standing up from a chair using your arms (e.g., wheelchair, bedside chair)?  3  -LR     Climbing 3-5 steps with a railing?  3  -LR     To walk in hospital room?  3  -LR     AM-PAC 6 Clicks Score (PT)  18  -LR     Row Name 09/08/20 5031          Tinetti Assessment    Sitting Balance  1  -LR     Arises  1  -LR     Attempts to Rise  1  -LR     Immediate Standing Balance (first 5 sec)  1  -LR     Standing Balance  1  -LR     Sternal Nudge (feet close together)  1  -LR     Eyes Closed (feet close together)  0  -LR     Turning  "360 Degrees- Steps  0  -LR     Turning 360 Degrees- Steadiness  1  -LR     Sitting Down  2  -LR     Tinetti Balance Score  9  -LR     Gait Initiation (immediate after told \"go\")  0  -LR     Step Length- Right Swing  1  -LR     Step Length- Left Swing  1  -LR     Foot Clearance- Right Foot  1  -LR     Foot Clearance- Left Foot  1  -LR     Step Symmetry  1  -LR     Step Continuity  1  -LR     Path (excursion)  1  -LR     Trunk  0  -LR     Base of Support  1  -LR     Gait Score  8  -LR     Tinetti Total Score  17  -LR     Tinetti Assistive Device  other (see comments) HHA  -LR     Row Name 09/08/20 0927          Functional Assessment    Outcome Measure Options  AM-PAC 6 Clicks Basic Mobility (PT);T[place]etti  -LR       User Key  (r) = Recorded By, (t) = Taken By, (c) = Cosigned By    Initials Name Provider Type    Bhupinder Fierro Physical Therapist        Physical Therapy Education                 Title: PT OT SLP Therapies (In Progress)     Topic: Physical Therapy (In Progress)     Point: Mobility training (Done)     Description:   Instruct learner(s) on safety and technique for assisting patient out of bed, chair or wheelchair.  Instruct in the proper use of assistive devices, such as walker, crutches, cane or brace.              Patient Friendly Description:   It's important to get you on your feet again, but we need to do so in a way that is safe for you. Falling has serious consequences, and your personal safety is the most important thing of all.        When it's time to get out of bed, one of us or a family member will sit next to you on the bed to give you support.     If your doctor or nurse tells you to use a walker, crutches, a cane, or a brace, be sure you use it every time you get out of bed, even if you think you don't need it.    Learning Progress Summary           Patient Acceptance, ETB, VU by LYUBOV at 9/8/2020 3090    Comment:  PT POC, role of therapy, and expectations.   Significant Other Acceptance, " E,TB, VU by LR at 9/8/2020 4514    Comment:  PT POC, role of therapy, and expectations.                   Point: Home exercise program (Not Started)     Description:   Instruct learner(s) on appropriate technique for monitoring, assisting and/or progressing patient with therapeutic exercises and activities.              Learner Progress:   Not documented in this visit.          Point: Body mechanics (Not Started)     Description:   Instruct learner(s) on proper positioning and spine alignment for patient and/or caregiver during mobility tasks and/or exercises.              Learner Progress:   Not documented in this visit.          Point: Precautions (Not Started)     Description:   Instruct learner(s) on prescribed precautions during mobility and gait tasks              Learner Progress:   Not documented in this visit.                      User Key     Initials Effective Dates Name Provider Type Discipline    LYUBOV 06/25/19 -  Bhupinder Gasca Physical Therapist PT              PT Recommendation and Plan  Planned Therapy Interventions (PT Eval): balance training, neuromuscular re-education, strengthening, stretching, swiss ball techniques, bed mobility training, orthotic fitting/training, gait training, home exercise program, transfer training, patient/family education, vestibular therapy, postural re-education, joint mobilization, lumbar stabilization, prosthetic fitting/training, wheelchair management/propulsion training, ROM (range of motion), manual therapy techniques, stair training  Outcome Summary/Treatment Plan (PT)  Anticipated Discharge Disposition (PT): home with assist, home with home health  Plan of Care Reviewed With: patient  Outcome Summary: PT eval completed on this date. Patient motivated to return home and get back to being independent. Bed mobility: CGA for supine>sit transfer Transfer: CGA for sit<>Stand transfer to improve forward trunk lean and steadiness MinAx1 for bed>chair transfer with HHA.  Gait: Patient ambulated 5'x1 with HHA. Balance: Tinetti balance and gait completed 17/28 which indicates high fall risk. Patient would benefit from skilled PT to address deficits in functional mobility. Recommend Home with assist and HHPT.      Time Calculation:   PT Charges     Row Name 09/08/20 1531             Time Calculation    Start Time  1450  -LR      Stop Time  1530  -LR      Time Calculation (min)  40 min  -LR      PT Received On  09/08/20  -LR      PT Goal Re-Cert Due Date  09/21/20  -LR         Time Calculation- PT    Total Timed Code Minutes- PT  40 minute(s)  -LR        User Key  (r) = Recorded By, (t) = Taken By, (c) = Cosigned By    Initials Name Provider Type    LR Bhupinder Gasca Physical Therapist        Therapy Charges for Today     Code Description Service Date Service Provider Modifiers Qty    55274717436 HC PT EVAL MOD COMPLEXITY 3 9/8/2020 Bhupinder Gasca GP 1          PT G-Codes  Outcome Measure Options: AM-PAC 6 Clicks Basic Mobility (PT), Tinetti  AM-PAC 6 Clicks Score (PT): 18  Tinetti Total Score: 17    Bhupinder Gasca  9/8/2020

## 2020-09-08 NOTE — PLAN OF CARE
Problem: Patient Care Overview  Goal: Plan of Care Review  Outcome: Ongoing (interventions implemented as appropriate)  Flowsheets (Taken 9/8/2020 5093)  Progress: improving  Plan of Care Reviewed With: patient  Outcome Summary: Pt extubated @ 0925, patient alert and oriented; Passed swallow eval; PT/OT ordered; pt up to chair this afternoon, will continue to monitor    Goal: Individualization and Mutuality  Outcome: Ongoing (interventions implemented as appropriate)  Goal: Discharge Needs Assessment  Outcome: Ongoing (interventions implemented as appropriate)  Goal: Interprofessional Rounds/Family Conf  Outcome: Ongoing (interventions implemented as appropriate)     Problem: Skin Injury Risk (Adult)  Goal: Identify Related Risk Factors and Signs and Symptoms  Outcome: Ongoing (interventions implemented as appropriate)  Goal: Skin Health and Integrity  Outcome: Ongoing (interventions implemented as appropriate)     Problem: Fall Risk (Adult)  Goal: Identify Related Risk Factors and Signs and Symptoms  Outcome: Ongoing (interventions implemented as appropriate)  Goal: Absence of Fall  Outcome: Ongoing (interventions implemented as appropriate)     Problem: Cardiac: Heart Failure (Adult)  Goal: Signs and Symptoms of Listed Potential Problems Will be Absent, Minimized or Managed (Cardiac: Heart Failure)  Outcome: Ongoing (interventions implemented as appropriate)     Problem: Sepsis/Septic Shock (Adult)  Goal: Signs and Symptoms of Listed Potential Problems Will be Absent, Minimized or Managed (Sepsis/Septic Shock)  Outcome: Ongoing (interventions implemented as appropriate)

## 2020-09-08 NOTE — PROGRESS NOTES
[facility]  INPATIENT PROGRESS NOTE    Name: Mark Sharma  Age/Sex: 59 y.o. male  :  1960        PCP: Tyler Singh MD    Vital Signs  Temp:  [98.4 °F (36.9 °C)-99.8 °F (37.7 °C)] 98.4 °F (36.9 °C)  Heart Rate:  [71-98] 92  Resp:  [16-29] 28  BP: ()/(57-87) 111/72  FiO2 (%):  [35 %] 35 %  Body mass index is 26.28 kg/m².     Subjective   Intubated but better.  Patient Active Problem List   Diagnosis   • Acute respiratory failure with hypoxia (CMS/HCC)   • Aortic stenosis, severe   • Acute systolic CHF (congestive heart failure) (CMS/HCC)       Past Medical History:   Diagnosis Date   • Hypertension        Current Facility-Administered Medications   Medication Dose Route Frequency Provider Last Rate Last Dose   • ! Vancomycin Peak   Does not apply Once Iker Parker MD       • [START ON 2020] ! Vancomycin Trough   Does not apply Once Iker Parker MD       • albuterol sulfate HFA (PROVENTIL HFA;VENTOLIN HFA;PROAIR HFA) inhaler 2 puff  2 puff Inhalation 4x Daily - RT Madi Griffith MD   2 puff at 20 0727   • AZITHROMYCIN 500 MG/250 ML 0.9% NS IVPB (vial-mate)  500 mg Intravenous Q24H Urbano Osborn MD   500 mg at 20 0530   • chlorhexidine (PERIDEX) 0.12 % solution 15 mL  15 mL Mouth/Throat Q12H Madi Griffith MD   15 mL at 20   • enoxaparin (LOVENOX) syringe 40 mg  40 mg Subcutaneous Q24H Urbano Osborn MD   40 mg at 20 0803   • famotidine (PEPCID) injection 20 mg  20 mg Intravenous Q12H Madi Griffith MD   20 mg at 20   • fentaNYL citrate (PF) (SUBLIMAZE) injection 25 mcg  25 mcg Intravenous Q2H PRN Iker Parker MD       • furosemide (LASIX) injection 20 mg  20 mg Intravenous Q6H Marija Sneed APRN   20 mg at 20 0149   • ipratropium (ATROVENT HFA) inhaler 2 puff  2 puff Inhalation 4x Daily - RT Gladis,  Madi Humphries MD   2 puff at 09/08/20 0727   • lactated ringers infusion  30 mL/hr Intravenous Continuous Marija Sneed APRN 30 mL/hr at 09/08/20 0417 30 mL/hr at 09/08/20 0417   • midazolam (VERSED) 50mg/100mL normal saline infusion  1-10 mg/hr Intravenous Titrated Urbano Osborn MD 16 mL/hr at 09/08/20 0640 8 mg/hr at 09/08/20 0640   • nicotine (NICODERM CQ) 14 MG/24HR patch 1 patch  1 patch Transdermal Q24H Iker Parker MD   1 patch at 09/07/20 1121   • Pharmacy to dose vancomycin   Does not apply Continuous Iker Parker MD       • propofol (DIPRIVAN) infusion 10 mg/mL 100 mL  5-50 mcg/kg/min Intravenous Titrated Vic Rodriguez MD   Stopped at 09/08/20 0445   • sodium chloride 0.9 % flush 10 mL  10 mL Intravenous PRN Vic Rodriguez MD       • sodium chloride 0.9 % flush 10 mL  10 mL Intravenous Q12H Urbano Osborn MD   10 mL at 09/07/20 2003   • sodium chloride 0.9 % flush 10 mL  10 mL Intravenous PRN Urbano Osborn MD       • thiamine (B-1) 100 mg in sodium chloride 0.9 % 100 mL IVPB  100 mg Intravenous Daily Iker Parker  mL/hr at 09/07/20 1108 100 mg at 09/07/20 1108   • vancomycin 1500 mg/500 mL 0.9% NS IVPB ([facility])  1,500 mg Intravenous Q12H Iker Parker MD   1,500 mg at 09/08/20 0149       Past Surgical History:   Procedure Laterality Date   • APPENDECTOMY     • COLONOSCOPY N/A 12/20/2019    Procedure: COLONOSCOPY;  Surgeon: Dion Ambrocio DO;  Location: [facility] ENDOSCOPY;  Service: Gastroenterology       Social History     Socioeconomic History   • Marital status: Single     Spouse name: Not on file   • Number of children: Not on file   • Years of education: Not on file   • Highest education level: Not on file   Tobacco Use   • Smoking status: Former Smoker     Years: 2.00     Types: Cigars   • Smokeless tobacco: Never Used   Substance and Sexual Activity   • Alcohol use: Yes     Alcohol/week: 2.0 standard drinks     Types: 2 Cans  of beer per week     Comment: 2 beer/wk maybe 6-8 on wknd   • Drug use: Not Currently   • Sexual activity: Defer       O/E    Neck: Supple.  No JVD, no thyroid enlargement.  Chest: Air entry equal, normal respiration.  No rhonchi or creps.  Cardiovascular system:  Regular rate and rhythm, 2/6 murmurs.  Abdomen: Soft, no tenderness, bowel sounds present, no hepatosplenomegaly.  CNS: Alert, oriented to place and time.  No motor or sensory deficit.  Cranial nerves intact.  Musculoskeletal: No deformity of the back or spine.  Extremities:  No edema.  Pulses equal on both sides.    Lab Results (last 24 hours)     Procedure Component Value Units Date/Time    CBC & Differential [[id number]] Collected:  09/08/20 0504    Specimen:  Blood Updated:  09/08/20 0516    Narrative:       The following orders were created for panel order CBC & Differential.  Procedure                               Abnormality         Status                     ---------                               -----------         ------                     CBC Auto Differential[[id number]]        Abnormal            Final result                 Please view results for these tests on the individual orders.    Comprehensive Metabolic Panel [[id number]]  (Abnormal) Collected:  09/08/20 0504    Specimen:  Blood Updated:  09/08/20 0536     Glucose 119 mg/dL      BUN 17 mg/dL      Creatinine 0.83 mg/dL      Sodium 140 mmol/L      Potassium 3.8 mmol/L      Chloride 106 mmol/L      CO2 27.0 mmol/L      Calcium 8.3 mg/dL      Total Protein 5.6 g/dL      Albumin 3.30 g/dL      ALT (SGPT) 21 U/L      AST (SGOT) 24 U/L      Alkaline Phosphatase 67 U/L      Total Bilirubin 1.0 mg/dL      eGFR Non African Amer 95 mL/min/1.73      Globulin 2.3 gm/dL      A/G Ratio 1.4 g/dL      BUN/Creatinine Ratio 20.5     Anion Gap 7.0 mmol/L     Narrative:       GFR Normal >60  Chronic Kidney Disease <60  Kidney Failure <15      Magnesium [[id number]]  (Normal) Collected:  09/08/20 0504     Specimen:  Blood Updated:  09/08/20 0536     Magnesium 2.1 mg/dL     Phosphorus [[id number]]  (Normal) Collected:  09/08/20 0504    Specimen:  Blood Updated:  09/08/20 0545     Phosphorus 4.1 mg/dL     CBC Auto Differential [[id number]]  (Abnormal) Collected:  09/08/20 0504    Specimen:  Blood Updated:  09/08/20 0516     WBC 9.22 10*3/mm3      RBC 4.30 10*6/mm3      Hemoglobin 14.2 g/dL      Hematocrit 41.0 %      MCV 95.3 fL      MCH 33.0 pg      MCHC 34.6 g/dL      RDW 12.4 %      RDW-SD 43.4 fl      MPV 11.3 fL      Platelets 174 10*3/mm3      Neutrophil % 72.5 %      Lymphocyte % 16.1 %      Monocyte % 9.0 %      Eosinophil % 1.7 %      Basophil % 0.4 %      Immature Grans % 0.3 %      Neutrophils, Absolute 6.68 10*3/mm3      Lymphocytes, Absolute 1.48 10*3/mm3      Monocytes, Absolute 0.83 10*3/mm3      Eosinophils, Absolute 0.16 10*3/mm3      Basophils, Absolute 0.04 10*3/mm3      Immature Grans, Absolute 0.03 10*3/mm3      nRBC 0.0 /100 WBC     Blood Gas, Arterial [[id number]]  (Abnormal) Collected:  09/08/20 0646    Specimen:  Arterial Blood Updated:  09/08/20 0658     Site Right Radial     Lenny's Test Positive     pH, Arterial 7.478 pH units      Comment: 83 Value above reference range        pCO2, Arterial 32.8 mm Hg      Comment: 84 Value below reference range        pO2, Arterial 104.0 mm Hg      HCO3, Arterial 24.3 mmol/L      Base Excess, Arterial 1.4 mmol/L      O2 Saturation, Arterial 98.8 %      Barometric Pressure for Blood Gas 747 mmHg      Modality Ventilator     FIO2 35 %      Ventilator Mode PSV     PEEP 5.0     PSV 8.0 cmH2O      Collected by RT     Comment: Meter: [id number]     :  [id number]             A/P  AS-  No ACr due to Severe AS.  Decrease diuretics.  Extubated today.        This document has been electronically signed by Veronica Gonzalez MD on September 8, 2020 07:59         Part of this note may be an electronic transcription/translation of spoken language to printed  text using the Dragon Dictation System.

## 2020-09-08 NOTE — PLAN OF CARE
Problem: Ventilation, Mechanical Invasive (Adult)  Goal: Signs and Symptoms of Listed Potential Problems Will be Absent, Minimized or Managed (Ventilation, Mechanical Invasive)  Outcome: Ongoing (interventions implemented as appropriate)   Pt tolerating current vent settings, no signs of distress noted at this time. Will attempt SBT this AM.

## 2020-09-08 NOTE — PLAN OF CARE
Problem: Patient Care Overview  Goal: Plan of Care Review  Outcome: Ongoing (interventions implemented as appropriate)  Flowsheets  Taken 9/8/2020 0532  Progress: no change  Outcome Summary: pt on SBT/SAT at this time. pt tolerating well. urine output adequate. pt vital signs stabe. will continue to monitor.  Taken 9/8/2020 0400  Plan of Care Reviewed With: family

## 2020-09-08 NOTE — THERAPY TREATMENT NOTE
Acute Care - Speech Language Pathology Initial Evaluation  [facility]     Patient Name: Mark Sharma  : 1960  MRN: [id number]  Today's Date: 2020               Admit Date: 2020     Visit Dx:    ICD-10-CM ICD-9-CM   1. Acute respiratory failure with hypoxia (CMS/[facility]) J96.01 518.81   2. Pneumonia due to organism J18.9 486   3. COPD exacerbation (CMS/[facility]) J44.1 491.21   4. Dysphagia, unspecified type R13.10 787.20     Patient Active Problem List   Diagnosis   • Acute respiratory failure with hypoxia (CMS/[facility])   • Aortic stenosis, severe   • Acute systolic CHF (congestive heart failure) (CMS/[facility])     Past Medical History:   Diagnosis Date   • Hypertension      Past Surgical History:   Procedure Laterality Date   • APPENDECTOMY     • COLONOSCOPY N/A 2019    Procedure: COLONOSCOPY;  Surgeon: Dion Ambrocio DO;  Location: [facility] ENDOSCOPY;  Service: Gastroenterology        SLP EVALUATION (last 72 hours)      SLP SLC Evaluation     Row Name 20 1336                   Communication Assessment/Intervention    Patient/Family Observations  Friend in room   -EK           General Information    Precautions/Limitations, Vision  WFL with corrective lenses  -EK        Precautions/Limitations, Hearing  WFL  -EK        Plans/Goals Discussed with  patient  -EK           Pain Assessment    Additional Documentation  Pain Scale: Numbers Pre/Post-Treatment (Group)  -EK           Pain Scale: Numbers Pre/Post-Treatment    Pain Scale: Numbers, Pretreatment  0/10 - no pain  -EK        Pain Scale: Numbers, Post-Treatment  0/10 - no pain  -EK          User Key  (r) = Recorded By, (t) = Taken By, (c) = Cosigned By    Initials Name Effective Dates    Shira Gould CCC-SLP 19 -              EDUCATION  The patient has been educated in the following areas:     Dysphagia (Swallowing Impairment).    SLP Recommendation and Plan        Swallow Criteria for Skilled Therapeutic  Interventions Met: demonstrates skilled criteria     Therapy Frequency (Swallow): evaluation only       Plan of Care Reviewed With: patient  Progress: improving  Outcome Summary: Swallow: WFL. No s/s of aspiration for regular liquids and solids.  Pt's dentures not available but friend to bring in tomorrow therefore SLP will initiate soft diet with chopped meat until dentures arrive. Will upgrade to regular when dentures available.      SLP GOALS     Row Name 09/08/20 1336             Oral Nutrition/Hydration Goal 1 (SLP)    Oral Nutrition/Hydration Goal 1, SLP  Pt to tolerate least restrictive diet with no s/s of aspiraiton for adequate nutrition and hydration.   -EK      Time Frame (Oral Nutrition/Hydration Goal 1, SLP)  by discharge  -EK      Progress/Outcomes (Oral Nutrition/Hydration Goal 1, SLP)  other (see comments) new goal   -EK        User Key  (r) = Recorded By, (t) = Taken By, (c) = Cosigned By    Initials Name Provider Type    Shira Gould CCC-SLP Speech and Language Pathologist                  Time Calculation:     Time Calculation- SLP     Row Name 09/08/20 1352             Time Calculation- SLP    SLP Start Time  1336  -EK      SLP Stop Time  1355  -EK      SLP Time Calculation (min)  19 min  -EK      SLP Received On  09/08/20  -EK      SLP Goal Re-Cert Due Date  09/21/20  -EK        User Key  (r) = Recorded By, (t) = Taken By, (c) = Cosigned By    Initials Name Provider Type    Shira Gould CCC-SLP Speech and Language Pathologist          Therapy Charges for Today     Code Description Service Date Service Provider Modifiers Qty    [id number]  ST EVAL ORAL PHARYNG SWALLOW 1 9/8/2020 Shira Alexis CCC-SLP GN 1                     ARTI Murillo  9/8/2020

## 2020-09-08 NOTE — PROGRESS NOTES
[facility] Medicine Services  INPATIENT PROGRESS NOTE    Length of Stay: 3  Date of Admission: 9/5/2020  Primary Care Physician: Tyler Singh MD    Subjective   Chief Complaint: Respiratory failure.    HPI: Patient is seen for follow-up.  He is 59-year-old male with history of nicotine dependence, alcoholism, BPH who presented to the emergency room  with a week history of progressive shortness of breath.  Of note patient was seen by his PCP on 8/22 complaining of 2-week history of epigastric pain.  He had worsening respiratory distress and was urgently intubated.     Patient was extubated today, doing well and on supplemental oxygen of 5 L nasal cannula saturation in the mid to upper 90s.  He is deconditioned, less short of air and voices no new complaints.     Review of Systems   Constitutional: Positive for activity change, appetite change and fatigue. Negative for chills, diaphoresis and fever.   HENT: Negative for trouble swallowing and voice change.    Eyes: Negative for photophobia and visual disturbance.   Respiratory: Negative for cough, choking, chest tightness, shortness of breath, wheezing and stridor.    Cardiovascular: Negative for chest pain, palpitations and leg swelling.   Gastrointestinal: Negative for abdominal distention, abdominal pain, blood in stool, constipation, diarrhea, nausea and vomiting.   Endocrine: Negative for cold intolerance, heat intolerance, polydipsia, polyphagia and polyuria.   Genitourinary: Negative for decreased urine volume, difficulty urinating, dysuria, enuresis, flank pain, frequency, hematuria and urgency.   Musculoskeletal: Negative for arthralgias, gait problem, myalgias, neck pain and neck stiffness.   Skin: Negative for pallor, rash and wound.   Neurological: Negative for dizziness, tremors, seizures, syncope, facial asymmetry, speech difficulty, weakness, light-headedness, numbness and headaches.   Hematological: Does  not bruise/bleed easily.   Psychiatric/Behavioral: Negative for agitation, behavioral problems and confusion.       Objective    Temp:  [98.4 °F (36.9 °C)-99.8 °F (37.7 °C)] 98.4 °F (36.9 °C)  Heart Rate:  [] 103  Resp:  [16-29] 25  BP: ()/(57-89) 136/76  FiO2 (%):  [35 %] 35 %      Physical Exam   Constitutional: He is oriented to person, place, and time. He appears well-developed and well-nourished. No distress.   HENT:   Head: Normocephalic and atraumatic.   Eyes: Pupils are equal, round, and reactive to light. EOM are normal. No scleral icterus.   Neck: Normal range of motion. Neck supple. No JVD present. No thyromegaly present.   Cardiovascular: Normal rate, regular rhythm and normal heart sounds. Exam reveals no gallop and no friction rub.   No murmur heard.  Pulmonary/Chest: Effort normal. He has no decreased breath sounds. He has no wheezes. He has rhonchi. He has no rales. He exhibits no tenderness.   Abdominal: Soft. Bowel sounds are normal. He exhibits no distension and no mass. There is no tenderness. There is no rebound and no guarding.   Musculoskeletal: He exhibits no edema, tenderness or deformity.   Neurological: He is alert and oriented to person, place, and time. No cranial nerve deficit or sensory deficit. He exhibits normal muscle tone. Coordination normal.   Skin: Skin is warm and dry. No rash noted. He is not diaphoretic. No erythema. No pallor.   Psychiatric: He has a normal mood and affect. His behavior is normal. Judgment and thought content normal.   Nursing note and vitals reviewed.        Medication Review:    Current Facility-Administered Medications:   •  ! Vancomycin Peak, , Does not apply, Once, Iker Parker MD  •  [START ON 9/9/2020] ! Vancomycin Trough, , Does not apply, Once, Iker Parker MD  •  AZITHROMYCIN 500 MG/250 ML 0.9% NS IVPB (vial-mate), 500 mg, Intravenous, Q24H, Urbano Osborn MD, 500 mg at 09/08/20 0530  •  bisoprolol (ZEBeta) half tablet  2.5 mg, 2.5 mg, Oral, Q24H, Veronica Gonzalez MD, 2.5 mg at 09/08/20 1023  •  chlorhexidine (PERIDEX) 0.12 % solution 15 mL, 15 mL, Mouth/Throat, Q12H, Madi Griffith MD, 15 mL at 09/08/20 0810  •  enoxaparin (LOVENOX) syringe 40 mg, 40 mg, Subcutaneous, Q24H, Urbano Osborn MD, 40 mg at 09/08/20 0810  •  famotidine (PEPCID) injection 20 mg, 20 mg, Intravenous, Q12H, Madi Griffith MD, 20 mg at 09/08/20 0810  •  fentaNYL citrate (PF) (SUBLIMAZE) injection 25 mcg, 25 mcg, Intravenous, Q2H PRN, Iker Parker MD  •  furosemide (LASIX) injection 40 mg, 40 mg, Intravenous, Q12H, Veronica Gonzalez MD, 40 mg at 09/08/20 0818  •  ipratropium-albuterol (DUO-NEB) nebulizer solution 3 mL, 3 mL, Nebulization, 4x Daily - RT, Iker Parker MD, 3 mL at 09/08/20 1034  •  lactated ringers infusion, 30 mL/hr, Intravenous, Continuous, Marija Sneed APRN, Last Rate: 30 mL/hr at 09/08/20 0417, 30 mL/hr at 09/08/20 0417  •  LORazepam (ATIVAN) tablet 1 mg, 1 mg, Oral, Q2H PRN **OR** LORazepam (ATIVAN) injection 1 mg, 1 mg, Intravenous, Q2H PRN **OR** LORazepam (ATIVAN) tablet 2 mg, 2 mg, Oral, Q1H PRN **OR** LORazepam (ATIVAN) injection 2 mg, 2 mg, Intravenous, Q1H PRN **OR** LORazepam (ATIVAN) injection 2 mg, 2 mg, Intravenous, Q15 Min PRN **OR** LORazepam (ATIVAN) injection 2 mg, 2 mg, Intramuscular, Q15 Min PRN **OR** LORazepam (ATIVAN) tablet 4 mg, 4 mg, Oral, Q1H PRN **OR** LORazepam (ATIVAN) injection 4 mg, 4 mg, Intravenous, Q1H PRN, Iker Parker MD  •  nicotine (NICODERM CQ) 14 MG/24HR patch 1 patch, 1 patch, Transdermal, Q24H, Iker Parker MD, 1 patch at 09/08/20 0811  •  Pharmacy to dose vancomycin, , Does not apply, Continuous, Iker Parker MD  •  sodium chloride 0.9 % flush 10 mL, 10 mL, Intravenous, PRN, Vic Rodriguez MD  •  sodium chloride 0.9 % flush 10 mL, 10 mL, Intravenous, Q12H, Urbano Osborn MD, 10 mL at  09/07/20 2003  •  sodium chloride 0.9 % flush 10 mL, 10 mL, Intravenous, PRN, Urbano Osborn MD  •  thiamine (B-1) 100 mg in sodium chloride 0.9 % 100 mL IVPB, 100 mg, Intravenous, Daily, Iker Parker MD, Last Rate: 200 mL/hr at 09/08/20 0810, 100 mg at 09/08/20 0810  •  vancomycin 1500 mg/500 mL 0.9% NS IVPB ([facility]), 1,500 mg, Intravenous, Q12H, Iker Parker MD, 1,500 mg at 09/08/20 0149    Results Review:  I have reviewed the labs, radiology results, and diagnostic studies.    Laboratory Data:   Results from last 7 days   Lab Units 09/08/20  0504 09/07/20 0322 09/06/20 0219   SODIUM mmol/L 140 139 140   POTASSIUM mmol/L 3.8 3.7 3.6   CHLORIDE mmol/L 106 106 105   CO2 mmol/L 27.0 26.0 25.0   BUN mg/dL 17 16 18   CREATININE mg/dL 0.83 0.88 0.96   GLUCOSE mg/dL 119* 97 101*   CALCIUM mg/dL 8.3* 8.0* 8.1*   BILIRUBIN mg/dL 1.0 0.8 0.7   ALK PHOS U/L 67 60 62   ALT (SGPT) U/L 21 17 19   AST (SGOT) U/L 24 23 27   ANION GAP mmol/L 7.0 7.0 10.0     Estimated Creatinine Clearance: 119.1 mL/min (by C-G formula based on SCr of 0.83 mg/dL).  Results from last 7 days   Lab Units 09/08/20  0504 09/07/20 0322 09/06/20  0219   MAGNESIUM mg/dL 2.1 2.0 2.0   PHOSPHORUS mg/dL 4.1 2.9 4.1         Results from last 7 days   Lab Units 09/08/20  0504 09/07/20  0322 09/06/20 0219 09/05/20  0438   WBC 10*3/mm3 9.22 9.98 13.76* 12.29*   HEMOGLOBIN g/dL 14.2 13.2 13.6 16.0   HEMATOCRIT % 41.0 38.2 37.5 45.5   PLATELETS 10*3/mm3 174 158 188 256           Culture Data:   Blood Culture   Date Value Ref Range Status   09/06/2020 Staphylococcus, coagulase negative (C)  Final     Comment:     Probable contaminant requires clinical correlation, susceptibility not performed unless requested by physician.       No results found for: URINECX  Respiratory Culture   Date Value Ref Range Status   09/05/2020   Final    Rare Normal Respiratory Christen: NO S.aureus/MRSA or Pseudomonas aeruginosa     No results found for: WOUNDCX  No  results found for: STOOLCX  No components found for: BODYFLD    Radiology Data:   Imaging Results (Last 24 Hours)     Procedure Component Value Units Date/Time    XR Chest 1 View [[id number]] Collected:  09/08/20 0431     Updated:  09/08/20 0538    Narrative:         CHEST X-RAY 1 VIEW on 9/8/2020     CLINICAL INDICATION: Follow-up CHF    COMPARISON: 9/5/2020    FINDINGS: ET tube tip is in the mid thoracic trachea. Feeding  tube extends below the diaphragm and below the level of this  film. There are small left greater than right pleural effusions.  There has been improvement in bilateral opacities consistent with  improved edema and/or pneumonia. Heart is upper limits normal for  size. Hilar and mediastinal contours are within normal limits.      Impression:       Findings most consistent with improving changes of  CHF.    Electronically signed by:  Magdiel López  9/8/2020 5:37 AM CDT  Workstation: [id number]    XR Abdomen KUB [[id number]] Collected:  09/07/20 1605     Updated:  09/07/20 1633    Narrative:       PROCEDURE: XR ABDOMEN KUB    VIEWS:  1    INDICATION:Coretrak placement    COMPARISON: None    FINDINGS:      - Bowel gas pattern: Nonobstructive    - Free air: None    - Soft tissue:No gross evidence of organomegaly,      an abdominal mass,or ascites    - Calculi:None projecting over gallbladder       fossa, renal shadows, or anticipated course of the ureters.    - Osseous:Limited assessment, unremarkable for age.    - Misc: Coretrak catheter is present with tip just right of  midline, probably within the distal stomach        Impression:       Coretrak catheter terminates just right of midline, an expected  location of the distal stomach.        Electronically signed by:  Delmy Weiss MD  9/7/2020 4:32 PM CDT  Workstation: [id number]          ABG:  Results from last 7 days   Lab Units 09/08/20  0646   PH, ARTERIAL pH units 7.478*   PO2 ART mm Hg 104.0   PCO2, ARTERIAL mm Hg 32.8*   HCO3 ART mmol/L 24.3           I have reviewed the patient's current medications.     Assessment/Plan     Hospital Problem List:  Principal Problem:    Acute respiratory failure with hypoxia (CMS/[facility])  Active Problems:    Aortic stenosis, severe    Acute systolic CHF (congestive heart failure) (CMS/[facility])    Acute respiratory failure with hypoxia (secondary to bilateral infiltrate/acute systolic heart failure): Patient is status post extubation.  Continue noninvasive respiratory therapy, antimicrobial therapy and bronchodilators.    Acute systolic congestive heart failure (likely due to severe aortic stenosis): Continue diuretics with strict I's and O's, daily weights, salt and fluid restriction.  Patient will require right heart and left heart catheterization post extubation.  Elevated troponin has been attributed to acute illness.  Chest x-ray done this a.m. showed some improvement.  Input by cardiologist appreciated.  Echocardiogram done 9/5/2020 showed:  · Left ventricular wall thickness is consistent with mild concentric hypertrophy.  · There is severe calcification of the aortic valve.  · Mild aortic valve regurgitation is present.  · Severe aortic valve stenosis is present.  · EF appears decreased at approx 36-40%.     Sepsis secondary to bilateral pneumonia: Patient did screen positive for sepsis.  Continue IV antibiotics.  Blood culture showed coag negative Staphylococcus aureus likely due to contaminant and respiratory culture showed mixed bacterial herlinda.  Reactive leukocytosis has resolved.    Nicotine dependence: Continue nicotine patch.    History of alcoholism: Patient states he quit drinking alcohol more than a year ago.    Nutrition: Perform bedside swallowing assessment and begin oral intake if successful.    Continue GI and DVT prophylaxis.    Discharge Planning: In progress.    Iker Parker MD   09/08/20   10:41

## 2020-09-08 NOTE — CONSULTS
Adult Nutrition  Assessment    Patient Name:  Mark Sharma  YOB: 1960  MRN: [id number]  Admit Date:  9/5/2020    Assessment Date:  9/8/2020    Comments:  Pt extubated this am.  Coretrak remains in place but tube feeding on hold for Swallowing evaluation.  Tube feeding was started and was at 20cc/hr when put on hold.  Increased nutritional needs with hx of Alcoholism and sepsis due to bilateral pneumonia.  Pt receiving Thiamine, Abx, and Lasix.  RD will monitor diet initiation and/or tube feeding and add supplements if po diet is started.     Reason for Assessment     Row Name 09/08/20 1211          Reason for Assessment    Reason For Assessment  follow-up protocol         Nutrition/Diet History     Row Name 09/08/20 1211          Nutrition/Diet History    Typical Food/Fluid Intake  Pt drowsey.  Per staff pt was extubated this am.  Tube feeding on hold for Swallowing eval         Anthropometrics     Row Name 09/08/20 0500          Anthropometrics    Weight  87.9 kg (193 lb 12.6 oz)         Labs/Tests/Procedures/Meds     Row Name 09/08/20 1211          Labs/Procedures/Meds    Lab Results Reviewed  reviewed, pertinent        Diagnostic Tests/Procedures    Diagnostic Test/Procedure Reviewed  reviewed, pertinent     Diagnostic Test/Procedures Comments  Extubated        Medications    Pertinent Medications Reviewed  reviewed, pertinent     Pertinent Medications Comments  Thiamine; Lasix; Vanc         Physical Findings     Row Name 09/08/20 1217          Physical Findings    Overall Physical Appearance  on oxygen therapy     Tubes  nasoduodenal tube         Estimated/Assessed Needs     Row Name 09/08/20 0735          Calculation Measurements    Weight Used For Calculations  87.5 kg (193 lb)        Estimated/Assessed Needs    Additional Documentation  Additional Requirements (Group);Fluid Requirements (Group);[place]-St. Jeor Equation (Group);Protein Requirements (Group);Calorie Requirements  (Group);KCAL/KG (Group)        Calorie Requirements    Estimated Calorie Need Method  kcal/kg        KCAL/KG    KCAL/KG  25 Kcal/Kg (kcal)     25 Kcal/Kg (kcal)  2188.6        [place]-St. Jeor Equation    RMR ([place]-St. Jeor Equation)  1728.567        Protein Requirements    Weight Used For Protein Calculations  80.7 kg (178 lb)     Est Protein Requirement Amount (gms/kg)  1.3 gm protein     Estimated Protein Requirements (gms/day)  104.96         Nutrition Prescription Ordered     Row Name 09/08/20 1218          Nutrition Prescription PO    Current PO Diet  NPO        Nutrition Prescription EN    Enteral Route  ND On hold for swallowing eval     Product  Isosource HN (Osmolite 1.2)     TF Delivery Method  Continuous     Continuous TF Goal Rate (mL/hr)  75 mL/hr     Continuous TF Current Rate (mL/hr)  20 mL/hr     Water flush (mL)   25 mL     Water Flush Frequency  Per hour         Evaluation of Received Nutrient/Fluid Intake     Row Name 09/08/20 1220          Fluid Intake Evaluation    Enteral (Free Water) Fluid (mL)  -- Tube feeding going @ 20cc/hr when extubated        EN Evaluation    TF Changes  Held               Electronically signed by:  Izabella Mathews RD  09/08/20 12:28

## 2020-09-08 NOTE — PROGRESS NOTES
Pt  RR was 34 to 36 when I arrived in room, RN states she was able to talk pt into slowing RR down waiting on Dr. Parker to see pt.

## 2020-09-08 NOTE — PAYOR COMM NOTE
"Sandy Mckeon  [facility]  P :[phone]  F: [phone]      Ref#[id number]    Mark Sharma (59 y.o. Male)     Date of Birth Social Security Number Address Home Phone MRN    1960  [address]  [facility] [id number] [phone] [id number]    Ep[employer] Marital Status          None Single       Admission Date Admission Type Admitting Provider Attending Provider Department, Room/Bed    9/5/20 Emergency Madi Griffith MD Echendu, Anthony W, MD [facility] CRITICAL CARE STEPDOWN, 05/A    Discharge Date Discharge Disposition Discharge Destination                       Attending Provider:  Iker Parker MD    Allergies:  Metoprolol    Isolation:  None   Infection:  None   Code Status:  CPR    Ht:  182.9 cm (72.01\")   Wt:  87.9 kg (193 lb 12.6 oz)    Admission Cmt:  None   Principal Problem:  Acute respiratory failure with hypoxia (CMS/HCC) [J96.01]                 Active Insurance as of 9/5/2020     Primary Coverage     Payor Plan Insurance Group Employer/Plan Group    [employer] CROSS BLUE [employer] PPO [id number]     Payor Plan Address Payor Plan Phone Number Payor Plan Fax Number Effective Dates    [address] [phone]  8/1/2019 - None Entered    [place] [address]       Subscriber Name Subscriber Birth Date Member ID       MARK SHARMA 1960 [id number]                 Emergency Contacts      (Rel.) Home Phone Work Phone Mobile Phone    Kristy Harris (Friend) [phone] -- [phone]    Ciaran Sharma (Brother) [phone] -- [phone]    Kamran Sharma (Son) [phone] -- --               History & Physical      Urbano Osborn MD at 09/05/20 0638                [facility] Medicine Admission      Date of Admission: 9/5/2020      Primary Care Physician: Tyler Singh MD      Chief Complaint: Came in in " extremis    HPI:*Briefly this is a 59-year-old gentleman who presented the ED carrying the following problem list    1.  Hypertension  2.  History of BPH  3.  History of appendectomy  4.  History of tobacco use predominately a cigar exposure no history of cigarette use    Patient presents with a week history of progressive shortness of breath.  Of note patient was seen by his PCP on 8/22 complaining of 2-week history of epigastric pain.  Regardless patient brought in with increased respiratory rate and oxygen urgently intubated.  History is obtained from ED physician.  Patient denied any history of fevers, chills cough.  Denies any viral type symptomatology.  Patient's chest x-ray with bilateral process.  His white count was mildly elevated he had no fever although the patient post intubation is requiring vasopressors.  Of note he does have some EKG changes inferiorly bilaterally with initial troponin normal.        Concurrent Medical History:  has a past medical history of Hypertension.    Past Surgical History:  has a past surgical history that includes Appendectomy and Colonoscopy (N/A, 12/20/2019).    Family History: family history is not on file.     Social History:  reports that he has quit smoking. His smoking use included cigars. He quit after 2.00 years of use. He has never used smokeless tobacco. He reports that he drinks about 2.0 standard drinks of alcohol per week. He reports that he has current or past drug history.    Allergies:   Allergies   Allergen Reactions   • Metoprolol Rash       Medications:   Prior to Admission medications    Medication Sig Start Date End Date Taking? Authorizing Provider   amLODIPine (NORVASC) 10 MG tablet Take 10 mg by mouth Every Night.    David Gordon MD   irbesartan-hydrochlorothiazide (AVALIDE) 300-12.5 MG tablet Take 1 tablet by mouth Daily.    David Gordon MD   Multiple Vitamins-Minerals (MULTIVITAL PO) Take 1 tablet/day by mouth Daily.    Provider  MD David   naproxen sodium (ALEVE) 220 MG tablet Take 220 mg by mouth Daily.    Provider, MD David       Review of Systems:  Review of Systems   Unable to perform ROS: Intubated      Otherwise complete ROS is negative except as mentioned above.    Physical Exam:   Heart Rate:  [108-132] 108  Resp:  [26-32] 32  BP: ()/() 88/63  FiO2 (%):  [100 %] 100 %  Physical Exam   Constitutional: He appears well-developed and well-nourished.   HENT:   Head: Normocephalic and atraumatic.   Eyes: Pupils are equal, round, and reactive to light. EOM are normal.   Neck: Normal range of motion. Neck supple.   Cardiovascular: Normal rate and regular rhythm.   Pulmonary/Chest:   Just upper respiratory sounds and decreased breath sounds at both bases   Abdominal: Soft. Bowel sounds are normal.   Musculoskeletal: Normal range of motion.   Neurological:   Sedated on vent   Skin: Skin is warm and dry. Capillary refill takes less than 2 seconds.   Psychiatric: He has a normal mood and affect.   Nursing note and vitals reviewed.        Results Reviewed:  I have personally reviewed current lab, radiology, and data and agree with results.  Lab Results (last 24 hours)     Procedure Component Value Units Date/Time    Blood Gas, Arterial [[id number]]  (Abnormal) Collected:  09/05/20 0613    Specimen:  Arterial Blood Updated:  09/05/20 0626     Site Left Radial     Lenny's Test N/A     pH, Arterial 7.231 pH units      Comment: 84 Value below reference range        pCO2, Arterial 50.6 mm Hg      Comment: 83 Value above reference range        pO2, Arterial 95.1 mm Hg      HCO3, Arterial 21.2 mmol/L      Base Excess, Arterial -6.7 mmol/L      Comment: 84 Value below reference range        O2 Saturation, Arterial 95.9 %      Barometric Pressure for Blood Gas 755 mmHg      Modality Ventilator     FIO2 100 %      Ventilator Mode AC     Set Tidal Volume 550     Set Mech Resp Rate 20.0     PEEP 5.0     Collected by VC     Comment:  Meter: [id number]     :  [id number]       Lactic Acid, Plasma [[id number]]  (Abnormal) Collected:  09/05/20 0525    Specimen:  Blood Updated:  09/05/20 0604     Lactate 2.2 mmol/L     Lactic Acid, Reflex Timer (This will reflex a repeat order 3-3:15 hours after ordered.) [[id number]] Collected:  09/05/20 0525    Specimen:  Blood Updated:  09/05/20 0604    [place] Draw [[id number]] Collected:  09/05/20 0437    Specimen:  Blood Updated:  09/05/20 0545    Narrative:       The following orders were created for panel order [place] Draw.  Procedure                               Abnormality         Status                     ---------                               -----------         ------                     Light Blue Top[[id number]]                                   Final result               Green Top (Gel)[[id number]]                                  Final result               Lavender Top[[id number]]                                     Final result               Gold Top - SST[[id number]]                                   Final result                 Please view results for these tests on the individual orders.    Light Blue Top [[id number]] Collected:  09/05/20 0437    Specimen:  Blood Updated:  09/05/20 0545     Extra Tube hold for add-on     Comment: Auto resulted       Green Top (Gel) [[id number]] Collected:  09/05/20 0437    Specimen:  Blood Updated:  09/05/20 0545     Extra Tube Hold for add-ons.     Comment: Auto resulted.       Lavender Top [[id number]] Collected:  09/05/20 0438    Specimen:  Blood Updated:  09/05/20 0545     Extra Tube hold for add-on     Comment: Auto resulted       Gold Top - SST [[id number]] Collected:  09/05/20 0437    Specimen:  Blood Updated:  09/05/20 0545     Extra Tube Hold for add-ons.     Comment: Auto resulted.       Blood Culture - Blood, Arm, Left [[id number]] Collected:  09/05/20 0525    Specimen:  Blood from Arm, Left Updated:  09/05/20 0538    Comprehensive Metabolic Panel  [[id number]]  (Abnormal) Collected:  09/05/20 0437    Specimen:  Blood Updated:  09/05/20 0518     Glucose 191 mg/dL      BUN 12 mg/dL      Creatinine 1.06 mg/dL      Sodium 140 mmol/L      Potassium 3.4 mmol/L      Chloride 103 mmol/L      CO2 25.0 mmol/L      Calcium 8.6 mg/dL      Total Protein 6.6 g/dL      Albumin 4.20 g/dL      ALT (SGPT) 25 U/L      AST (SGOT) 26 U/L      Alkaline Phosphatase 81 U/L      Total Bilirubin 1.5 mg/dL      eGFR Non African Amer 72 mL/min/1.73      Globulin 2.4 gm/dL      A/G Ratio 1.8 g/dL      BUN/Creatinine Ratio 11.3     Anion Gap 12.0 mmol/L     Narrative:       GFR Normal >60  Chronic Kidney Disease <60  Kidney Failure <15      Troponin [[id number]]  (Normal) Collected:  09/05/20 0437    Specimen:  Blood Updated:  09/05/20 0518     Troponin T <0.010 ng/mL     Narrative:       Troponin T Reference Range:  <= 0.03 ng/mL-   Negative for AMI  >0.03 ng/mL-     Abnormal for myocardial necrosis.  Clinicians would have to utilize clinical acumen, EKG, Troponin and serial changes to determine if it is an Acute Myocardial Infarction or myocardial injury due to an underlying chronic condition.       Results may be falsely decreased if patient taking Biotin.      BNP [[id number]]  (Abnormal) Collected:  09/05/20 0437    Specimen:  Blood Updated:  09/05/20 0516     proBNP 3,740.0 pg/mL     Narrative:       Among patients with dyspnea, NT-proBNP is highly sensitive for the detection of acute congestive heart failure. In addition NT-proBNP of <300 pg/ml effectively rules out acute congestive heart failure with 99% negative predictive value.    Results may be falsely decreased if patient taking Biotin.      CBC & Differential [[id number]] Collected:  09/05/20 0438    Specimen:  Blood Updated:  09/05/20 0455    Narrative:       The following orders were created for panel order CBC & Differential.  Procedure                               Abnormality         Status                     ---------                                -----------         ------                     CBC Auto Differential[[id number]]        Abnormal            Final result                 Please view results for these tests on the individual orders.    CBC Auto Differential [[id number]]  (Abnormal) Collected:  09/05/20 0438    Specimen:  Blood Updated:  09/05/20 0455     WBC 12.29 10*3/mm3      RBC 4.79 10*6/mm3      Hemoglobin 16.0 g/dL      Hematocrit 45.5 %      MCV 95.0 fL      MCH 33.4 pg      MCHC 35.2 g/dL      RDW 12.4 %      RDW-SD 43.8 fl      MPV 11.6 fL      Platelets 256 10*3/mm3      Neutrophil % 64.3 %      Lymphocyte % 26.5 %      Monocyte % 6.7 %      Eosinophil % 1.5 %      Basophil % 0.7 %      Immature Grans % 0.3 %      Neutrophils, Absolute 7.90 10*3/mm3      Lymphocytes, Absolute 3.26 10*3/mm3      Monocytes, Absolute 0.82 10*3/mm3      Eosinophils, Absolute 0.19 10*3/mm3      Basophils, Absolute 0.08 10*3/mm3      Immature Grans, Absolute 0.04 10*3/mm3      nRBC 0.0 /100 WBC     Blood Gas, Arterial [[id number]]  (Abnormal) Collected:  09/05/20 0430    Specimen:  Arterial Blood Updated:  09/05/20 0438     Site Left Radial     Lenny's Test Positive     pH, Arterial 7.200 pH units      Comment: 85 Value below critical limit        pCO2, Arterial 53.7 mm Hg      Comment: 83 Value above reference range        pO2, Arterial 64.6 mm Hg      Comment: 84 Value below reference range        HCO3, Arterial 21.0 mmol/L      Base Excess, Arterial -7.7 mmol/L      Comment: 84 Value below reference range        O2 Saturation, Arterial 86.6 %      Comment: 84 Value below reference range        Barometric Pressure for Blood Gas 754 mmHg      Modality NRB     FIO2 100 %      Flow Rate 15.0 lpm      Ventilator Mode NA     Collected by B     Comment: Meter: [id number]     :  [id number]           Imaging Results (Last 24 Hours)     Procedure Component Value Units Date/Time    XR Chest 1 View [[id number]] Collected:  09/05/20 050       Updated:  09/05/20 0533    Narrative:       PORTABLE CHEST X-RAY    CLINICAL HISTORY: respiratory distress    COMPARISON: None.    FINDINGS: Portable AP view of the chest was obtained with  overlying monitor leads in place. ET tube terminates at the level  of the mid thoracic trachea. Nasogastric tube extends below the  diaphragm but the tip is not imaged. There are diffuse  groundglass and interstitial opacities likely severe edema.  Superimposed pneumonia cannot be entirely excluded. There are  small bilateral effusions. Borderline cardiomegaly.      Impression:       Extensive pulmonary opacities likely edema and  effusions      Electronically signed by:  Wally Orona MD  9/5/2020 5:32 AM CDT  Workstation: [id number]            Assessment:    There are no active hospital problems to display for this patient.    Impression    1.  Acute hypoxic respiratory failure with bilateral pulmonary infiltrates  -Initiated broad-spectrum antibiotics in the ED will continue  - Marked concern for underlying recent cardiovascular event with 2 weeks of epigastric pain/possibility of prior at least impaired fasting glucose if not diabetes and age.  Will check echocardiogram and 1 dose of loop diuretic and have cardiology to see    2.  Abnormal EKG  - See impression inferiorly  -We will rule out overnight  -As above cannot rule out recent myocardial event  -Check echocardiogram for abnormal LV function with question rec  -Rule out overnight with troponin again at 1000 hrs. and 1400 hrs. with initial troponin normal  -Have cardiology to see    3.  Elevated random blood sugar  - Not known to have impaired fasting glucose or diabetes  -Likely related to stress  -Check A1c    4.  Hypertension  -Hold antihypertensive medication with patient hypertensive currently  CODE STATUS patient is a full code    Prophylaxis  - Subcu Lovenox    Shandra with ED physician as well as ED nursing patient admitted to ICU expect greater than 48-hour  stay please see orders.  Briefly patient admitted with acute hypoxic event found to have bilateral infiltrates with marked concern from previous history of recent event.  Will continue with broad-spectrum antibiotics and rule out for COVID however marked concern of underlying cardiovascular event checking echocardiogram and having cardiology to see                          Urbano Osborn MD                Electronically signed by Urbano Osborn MD at 09/05/20 0657          Emergency Department Notes      Vic Rodriguez MD at 09/05/20 0512      Procedure Orders    1. Intubation [[id number]] ordered by Vic Rodriguez MD at 09/05/20 0556                Subjective     History provided by:  Patient   used: No    Patient is a 59 years old male with past medical history of hypertension, COPD who presented here today because of respiratory distress.  Patient said that he started he started about 2 weeks ago and progressively getting worse.  Denies any fever chills or sweating.  Denies any change in past.  Smokes about 1 pack a day for years and then stopped about a month ago.    Review of Systems   Respiratory: Positive for shortness of breath.    All other systems reviewed and are negative.      Past Medical History:   Diagnosis Date   • Hypertension        Allergies   Allergen Reactions   • Metoprolol Rash       Past Surgical History:   Procedure Laterality Date   • APPENDECTOMY     • COLONOSCOPY N/A 12/20/2019    Procedure: COLONOSCOPY;  Surgeon: Dion Ambrocio DO;  [facility] ENDOSCOPY;  Service: Gastroenterology       No family history on file.    Social History     Socioeconomic History   • Marital status: Single     Spouse name: Not on file   • Number of children: Not on file   • Years of education: Not on file   • Highest education level: Not on file   Tobacco Use   • Smoking status: Former Smoker     Years: 2.00     Types: Cigars   • Smokeless tobacco: Never Used   "  Substance and Sexual Activity   • Alcohol use: Yes     Alcohol/week: 2.0 standard drinks     Types: 2 Cans of beer per week     Comment: 2 beer/wk maybe 6-8 on wknd   • Drug use: Not Currently   • Sexual activity: Defer         BP (!) 88/63   Pulse 108   Resp (!) (P) 32   Ht 182.9 cm (72\")   Wt 90.7 kg (200 lb)   SpO2 94%   BMI 27.12 kg/m²    Objective   Physical Exam   Constitutional: He is oriented to person, place, and time. He appears toxic. He appears distressed.   HENT:   Head: Normocephalic and atraumatic.   Mouth/Throat: Oropharynx is clear and moist.   Eyes: Pupils are equal, round, and reactive to light. EOM are normal.   Neck: Normal range of motion. Neck supple.   Cardiovascular: Regular rhythm, normal heart sounds, intact distal pulses and normal pulses. Tachycardia present.   Pulmonary/Chest: Accessory muscle usage present. He is in respiratory distress. He has wheezes.   Abdominal: Soft. Bowel sounds are normal.   Musculoskeletal: Normal range of motion.        Right lower leg: Normal.        Left lower leg: Normal.   Neurological: He is alert and oriented to person, place, and time.   Skin: Skin is warm. Capillary refill takes less than 2 seconds.   Nursing note and vitals reviewed.      Intubation  Date/Time: 9/5/2020 5:56 AM  Performed by: Vic Rodriguez MD  Authorized by: Vic Rodriguez MD     Consent:     Consent obtained:  Verbal    Consent given by:  Patient    Risks discussed:  Aspiration, brain injury, dental trauma, laryngeal injury, hypoxia, death, pneumothorax and bleeding    Alternatives discussed:  Alternative treatment  Pre-procedure details:     Patient status:  Awake    Mallampati score:  I    Pretreatment medications:  None  Procedure details:     Preoxygenation:  Nonrebreather mask    CPR in progress: no      Intubation method:  Oral    Oral intubation technique:  Video-assisted    Laryngoscope blade:  Mac 3    Tube size (mm):  7.0    Tube type:  Cuffed    " Number of attempts:  1    Ventilation between attempts: no      Cricoid pressure: no      Tube visualized through cords: yes    Placement assessment:     ETT to lip:  22    ETT to teeth:  21    Breath sounds:  Equal    Placement verification: chest rise, CXR verification, direct visualization and ETCO2 detector      CXR findings:  ETT in proper place  Post-procedure details:     Patient tolerance of procedure:  Tolerated well, no immediate complications              ED Course  ED Course as of Sep 05 0632   Sat Sep 05, 2020   0630 Patient blood pressure was dropping patient was started on Levophed.  And patient is on propofol for sedation.  She is history of a history of possibly pneumonia patient was started on antibiotic Zithromax and Rocephin.Dr. Osborn was called who accepted patient.    [MO]      ED Course User Index  [MO] Vic Rodriguez MD      Labs Reviewed   COMPREHENSIVE METABOLIC PANEL - Abnormal; Notable for the following components:       Result Value    Glucose 191 (*)     Potassium 3.4 (*)     Total Bilirubin 1.5 (*)     All other components within normal limits    Narrative:     GFR Normal >60  Chronic Kidney Disease <60  Kidney Failure <15     BNP (IN-HOUSE) - Abnormal; Notable for the following components:    proBNP 3,740.0 (*)     All other components within normal limits    Narrative:     Among patients with dyspnea, NT-proBNP is highly sensitive for the detection of acute congestive heart failure. In addition NT-proBNP of <300 pg/ml effectively rules out acute congestive heart failure with 99% negative predictive value.    Results may be falsely decreased if patient taking Biotin.     CBC WITH AUTO DIFFERENTIAL - Abnormal; Notable for the following components:    WBC 12.29 (*)     MCH 33.4 (*)     Neutrophils, Absolute 7.90 (*)     Lymphocytes, Absolute 3.26 (*)     All other components within normal limits   BLOOD GAS, ARTERIAL - Abnormal; Notable for the following components:    pH,  Arterial 7.200 (*)     pCO2, Arterial 53.7 (*)     pO2, Arterial 64.6 (*)     Base Excess, Arterial -7.7 (*)     O2 Saturation, Arterial 86.6 (*)     All other components within normal limits   LACTIC ACID, PLASMA - Abnormal; Notable for the following components:    Lactate 2.2 (*)     All other components within normal limits   BLOOD GAS, ARTERIAL - Abnormal; Notable for the following components:    pH, Arterial 7.231 (*)     pCO2, Arterial 50.6 (*)     Base Excess, Arterial -6.7 (*)     All other components within normal limits   TROPONIN (IN-HOUSE) - Normal    Narrative:     Troponin T Reference Range:  <= 0.03 ng/mL-   Negative for AMI  >0.03 ng/mL-     Abnormal for myocardial necrosis.  Clinicians would have to utilize clinical acumen, EKG, Troponin and serial changes to determine if it is an Acute Myocardial Infarction or myocardial injury due to an underlying chronic condition.       Results may be falsely decreased if patient taking Biotin.     BLOOD CULTURE   BLOOD CULTURE   RAINBOW DRAW    Narrative:     The following orders were created for panel order [place] Draw.  Procedure                               Abnormality         Status                     ---------                               -----------         ------                     Light Blue Top[[id number]]                                   Final result               Green Top (Gel)[[id number]]                                  Final result               Lavender Top[[id number]]                                     Final result               Gold Top - SST[[id number]]                                   Final result                 Please view results for these tests on the individual orders.   BLOOD GAS, ARTERIAL   LACTIC ACID REFLEX TIMER   CBC AND DIFFERENTIAL    Narrative:     The following orders were created for panel order CBC & Differential.  Procedure                               Abnormality         Status                     ---------                                -----------         ------                     CBC Auto Differential[[id number]]        Abnormal            Final result                 Please view results for these tests on the individual orders.   LIGHT BLUE TOP   GREEN TOP   LAVENDER TOP   GOLD TOP - SST       XR Chest 1 View   Final Result   Extensive pulmonary opacities likely edema and   effusions         Electronically signed by:  Wally Orona MD  9/5/2020 5:32 AM CDT   Workstation: [id number]                                           [facility]    Final diagnoses:   Acute respiratory failure with hypoxia (CMS/[facility])   Pneumonia due to organism   COPD exacerbation (CMS/[facility])            Vic Rodriguez MD  09/05/20 0632      Electronically signed by Vic Rodriguez MD at 09/05/20 0632          Physician Progress Notes (last 7 days) (Notes from 09/01/20 1204 through 09/08/20 1204)      Iker Parker MD at 09/08/20 1041              [facility] Medicine Services  INPATIENT PROGRESS NOTE    Length of Stay: 3  Date of Admission: 9/5/2020  Primary Care Physician: Tyler Singh MD    Subjective   Chief Complaint: Respiratory failure.    HPI: Patient is seen for follow-up.  He is 59-year-old male with history of nicotine dependence, alcoholism, BPH who presented to the emergency room  with a week history of progressive shortness of breath.  Of note patient was seen by his PCP on 8/22 complaining of 2-week history of epigastric pain.  He had worsening respiratory distress and was urgently intubated.     Patient was extubated today, doing well and on supplemental oxygen of 5 L nasal cannula saturation in the mid to upper 90s.  He is deconditioned, less short of air and voices no new complaints.     Review of Systems   Constitutional: Positive for activity change, appetite change and fatigue. Negative for chills, diaphoresis and fever.   HENT: Negative for trouble swallowing and voice change.       Eyes: Negative for photophobia and visual disturbance.   Respiratory: Negative for cough, choking, chest tightness, shortness of breath, wheezing and stridor.    Cardiovascular: Negative for chest pain, palpitations and leg swelling.   Gastrointestinal: Negative for abdominal distention, abdominal pain, blood in stool, constipation, diarrhea, nausea and vomiting.   Endocrine: Negative for cold intolerance, heat intolerance, polydipsia, polyphagia and polyuria.   Genitourinary: Negative for decreased urine volume, difficulty urinating, dysuria, enuresis, flank pain, frequency, hematuria and urgency.   Musculoskeletal: Negative for arthralgias, gait problem, myalgias, neck pain and neck stiffness.   Skin: Negative for pallor, rash and wound.   Neurological: Negative for dizziness, tremors, seizures, syncope, facial asymmetry, speech difficulty, weakness, light-headedness, numbness and headaches.   Hematological: Does not bruise/bleed easily.   Psychiatric/Behavioral: Negative for agitation, behavioral problems and confusion.       Objective    Temp:  [98.4 °F (36.9 °C)-99.8 °F (37.7 °C)] 98.4 °F (36.9 °C)  Heart Rate:  [] 103  Resp:  [16-29] 25  BP: ()/(57-89) 136/76  FiO2 (%):  [35 %] 35 %      Physical Exam   Constitutional: He is oriented to person, place, and time. He appears well-developed and well-nourished. No distress.   HENT:   Head: Normocephalic and atraumatic.   Eyes: Pupils are equal, round, and reactive to light. EOM are normal. No scleral icterus.   Neck: Normal range of motion. Neck supple. No JVD present. No thyromegaly present.   Cardiovascular: Normal rate, regular rhythm and normal heart sounds. Exam reveals no gallop and no friction rub.   No murmur heard.  Pulmonary/Chest: Effort normal. He has no decreased breath sounds. He has no wheezes. He has rhonchi. He has no rales. He exhibits no tenderness.   Abdominal: Soft. Bowel sounds are normal. He exhibits no distension and no mass.  There is no tenderness. There is no rebound and no guarding.   Musculoskeletal: He exhibits no edema, tenderness or deformity.   Neurological: He is alert and oriented to person, place, and time. No cranial nerve deficit or sensory deficit. He exhibits normal muscle tone. Coordination normal.   Skin: Skin is warm and dry. No rash noted. He is not diaphoretic. No erythema. No pallor.   Psychiatric: He has a normal mood and affect. His behavior is normal. Judgment and thought content normal.   Nursing note and vitals reviewed.        Medication Review:    Current Facility-Administered Medications:   •  ! Vancomycin Peak, , Does not apply, Once, Iker Parker MD  •  [START ON 9/9/2020] ! Vancomycin Trough, , Does not apply, Once, Iker Parker MD  •  AZITHROMYCIN 500 MG/250 ML 0.9% NS IVPB (vial-mate), 500 mg, Intravenous, Q24H, Urbano Osborn MD, 500 mg at 09/08/20 0530  •  bisoprolol (ZEBeta) half tablet 2.5 mg, 2.5 mg, Oral, Q24H, Veronica Gonzalez MD, 2.5 mg at 09/08/20 1023  •  chlorhexidine (PERIDEX) 0.12 % solution 15 mL, 15 mL, Mouth/Throat, Q12H, Madi Griffith MD, 15 mL at 09/08/20 0810  •  enoxaparin (LOVENOX) syringe 40 mg, 40 mg, Subcutaneous, Q24H, Urbano Osborn MD, 40 mg at 09/08/20 0810  •  famotidine (PEPCID) injection 20 mg, 20 mg, Intravenous, Q12H, Madi Griffith MD, 20 mg at 09/08/20 0810  •  fentaNYL citrate (PF) (SUBLIMAZE) injection 25 mcg, 25 mcg, Intravenous, Q2H PRN, Iker Parker MD  •  furosemide (LASIX) injection 40 mg, 40 mg, Intravenous, Q12H, Veronica Gonzalez MD, 40 mg at 09/08/20 0818  •  ipratropium-albuterol (DUO-NEB) nebulizer solution 3 mL, 3 mL, Nebulization, 4x Daily - RT, Iker Parker MD, 3 mL at 09/08/20 1034  •  lactated ringers infusion, 30 mL/hr, Intravenous, Continuous, Marija Sneed APRN, Last Rate: 30 mL/hr at 09/08/20 0417, 30 mL/hr at 09/08/20 0417  •  LORazepam (ATIVAN) tablet  1 mg, 1 mg, Oral, Q2H PRN **OR** LORazepam (ATIVAN) injection 1 mg, 1 mg, Intravenous, Q2H PRN **OR** LORazepam (ATIVAN) tablet 2 mg, 2 mg, Oral, Q1H PRN **OR** LORazepam (ATIVAN) injection 2 mg, 2 mg, Intravenous, Q1H PRN **OR** LORazepam (ATIVAN) injection 2 mg, 2 mg, Intravenous, Q15 Min PRN **OR** LORazepam (ATIVAN) injection 2 mg, 2 mg, Intramuscular, Q15 Min PRN **OR** LORazepam (ATIVAN) tablet 4 mg, 4 mg, Oral, Q1H PRN **OR** LORazepam (ATIVAN) injection 4 mg, 4 mg, Intravenous, Q1H PRN, Iker Parker MD  •  nicotine (NICODERM CQ) 14 MG/24HR patch 1 patch, 1 patch, Transdermal, Q24H, Iker Parker MD, 1 patch at 09/08/20 0811  •  Pharmacy to dose vancomycin, , Does not apply, Continuous, Iker Parker MD  •  sodium chloride 0.9 % flush 10 mL, 10 mL, Intravenous, PRN, OzorVic MD  •  sodium chloride 0.9 % flush 10 mL, 10 mL, Intravenous, Q12H, Urbano Osborn MD, 10 mL at 09/07/20 2003  •  sodium chloride 0.9 % flush 10 mL, 10 mL, Intravenous, PRN, Urbano Osborn MD  •  thiamine (B-1) 100 mg in sodium chloride 0.9 % 100 mL IVPB, 100 mg, Intravenous, Daily, Iker Parker MD, Last Rate: 200 mL/hr at 09/08/20 0810, 100 mg at 09/08/20 0810  •  vancomycin 1500 mg/500 mL 0.9% NS IVPB ([facility]), 1,500 mg, Intravenous, Q12H, Iker Parker MD, 1,500 mg at 09/08/20 0149    Results Review:  I have reviewed the labs, radiology results, and diagnostic studies.    Laboratory Data:   Results from last 7 days   Lab Units 09/08/20  0504 09/07/20  0322 09/06/20  0219   SODIUM mmol/L 140 139 140   POTASSIUM mmol/L 3.8 3.7 3.6   CHLORIDE mmol/L 106 106 105   CO2 mmol/L 27.0 26.0 25.0   BUN mg/dL 17 16 18   CREATININE mg/dL 0.83 0.88 0.96   GLUCOSE mg/dL 119* 97 101*   CALCIUM mg/dL 8.3* 8.0* 8.1*   BILIRUBIN mg/dL 1.0 0.8 0.7   ALK PHOS U/L 67 60 62   ALT (SGPT) U/L 21 17 19   AST (SGOT) U/L 24 23 27   ANION GAP mmol/L 7.0 7.0 10.0     Estimated Creatinine Clearance: 119.1 mL/min (by  C-G formula based on SCr of 0.83 mg/dL).  Results from last 7 days   Lab Units 09/08/20  0504 09/07/20  0322 09/06/20  0219   MAGNESIUM mg/dL 2.1 2.0 2.0   PHOSPHORUS mg/dL 4.1 2.9 4.1         Results from last 7 days   Lab Units 09/08/20  0504 09/07/20  0322 09/06/20  0219 09/05/20  0438   WBC 10*3/mm3 9.22 9.98 13.76* 12.29*   HEMOGLOBIN g/dL 14.2 13.2 13.6 16.0   HEMATOCRIT % 41.0 38.2 37.5 45.5   PLATELETS 10*3/mm3 174 158 188 256           Culture Data:   Blood Culture   Date Value Ref Range Status   09/06/2020 Staphylococcus, coagulase negative (C)  Final     Comment:     Probable contaminant requires clinical correlation, susceptibility not performed unless requested by physician.       No results found for: URINECX  Respiratory Culture   Date Value Ref Range Status   09/05/2020   Final    Rare Normal Respiratory Christen: NO S.aureus/MRSA or Pseudomonas aeruginosa     No results found for: WOUNDCX  No results found for: STOOLCX  No components found for: BODYFLD    Radiology Data:   Imaging Results (Last 24 Hours)     Procedure Component Value Units Date/Time    XR Chest 1 View [[id number]] Collected:  09/08/20 0431     Updated:  09/08/20 0538    Narrative:         CHEST X-RAY 1 VIEW on 9/8/2020     CLINICAL INDICATION: Follow-up CHF    COMPARISON: 9/5/2020    FINDINGS: ET tube tip is in the mid thoracic trachea. Feeding  tube extends below the diaphragm and below the level of this  film. There are small left greater than right pleural effusions.  There has been improvement in bilateral opacities consistent with  improved edema and/or pneumonia. Heart is upper limits normal for  size. Hilar and mediastinal contours are within normal limits.      Impression:       Findings most consistent with improving changes of  CHF.    Electronically signed by:  Magdiel López  9/8/2020 5:37 AM CDT  Workstation: [id number]    XR Abdomen KUB [[id number]] Collected:  09/07/20 1605     Updated:  09/07/20 1633    Narrative:        PROCEDURE: XR ABDOMEN KUB    VIEWS:  1    INDICATION:Coretrak placement    COMPARISON: None    FINDINGS:      - Bowel gas pattern: Nonobstructive    - Free air: None    - Soft tissue:No gross evidence of organomegaly,      an abdominal mass,or ascites    - Calculi:None projecting over gallbladder       fossa, renal shadows, or anticipated course of the ureters.    - Osseous:Limited assessment, unremarkable for age.    - Misc: Coretrak catheter is present with tip just right of  midline, probably within the distal stomach        Impression:       Coretrak catheter terminates just right of midline, an expected  location of the distal stomach.        Electronically signed by:  Delmy Weiss MD  9/7/2020 4:32 PM CDT  Workstation: [id number]          ABG:  Results from last 7 days   Lab Units 09/08/20  0646   PH, ARTERIAL pH units 7.478*   PO2 ART mm Hg 104.0   PCO2, ARTERIAL mm Hg 32.8*   HCO3 ART mmol/L 24.3       I have reviewed the patient's current medications.     Assessment/Plan     Hospital Problem List:  Principal Problem:    Acute respiratory failure with hypoxia (CMS/HCC)  Active Problems:    Aortic stenosis, severe    Acute systolic CHF (congestive heart failure) (CMS/HCC)    Acute respiratory failure with hypoxia (secondary to bilateral infiltrate/acute systolic heart failure): Patient is status post extubation.  Continue noninvasive respiratory therapy, antimicrobial therapy and bronchodilators.    Acute systolic congestive heart failure (likely due to severe aortic stenosis): Continue diuretics with strict I's and O's, daily weights, salt and fluid restriction.  Patient will require right heart and left heart catheterization post extubation.  Elevated troponin has been attributed to acute illness.  Chest x-ray done this a.m. showed some improvement.  Input by cardiologist appreciated.  Echocardiogram done 9/5/2020 showed:  · Left ventricular wall thickness is consistent with mild concentric  hypertrophy.  · There is severe calcification of the aortic valve.  · Mild aortic valve regurgitation is present.  · Severe aortic valve stenosis is present.  · EF appears decreased at approx 36-40%.     Sepsis secondary to bilateral pneumonia: Patient did screen positive for sepsis.  Continue IV antibiotics.  Blood culture showed coag negative Staphylococcus aureus likely due to contaminant and respiratory culture showed mixed bacterial herlinda.  Reactive leukocytosis has resolved.    Nicotine dependence: Continue nicotine patch.    History of alcoholism: Patient states he quit drinking alcohol more than a year ago.    Nutrition: Perform bedside swallowing assessment and begin oral intake if successful.    Continue GI and DVT prophylaxis.    Discharge Planning: In progress.    Iker Parker MD   20   10:41        Electronically signed by Iker Parker MD at 20 1051     Veronica Gonzalez MD at 20 0756            [facility] Cardiology  INPATIENT PROGRESS NOTE    Name: Mark Sharma  Age/Sex: 59 y.o. male  :  1960        PCP: Tyler Singh MD    Vital Signs  Temp:  [98.4 °F (36.9 °C)-99.8 °F (37.7 °C)] 98.4 °F (36.9 °C)  Heart Rate:  [71-98] 92  Resp:  [16-29] 28  BP: ()/(57-87) 111/72  FiO2 (%):  [35 %] 35 %  Body mass index is 26.28 kg/m².     Subjective   Intubated but better.  Patient Active Problem List   Diagnosis   • Acute respiratory failure with hypoxia (CMS/[facility])   • Aortic stenosis, severe   • Acute systolic CHF (congestive heart failure) (CMS/[facility])       Past Medical History:   Diagnosis Date   • Hypertension        Current Facility-Administered Medications   Medication Dose Route Frequency Provider Last Rate Last Dose   • ! Vancomycin Peak   Does not apply Once Iker Parker MD       • [START ON 2020] ! Vancomycin Trough   Does not apply Once Iker Parker MD       • albuterol sulfate HFA (PROVENTIL HFA;VENTOLIN  HFA;PROAIR HFA) inhaler 2 puff  2 puff Inhalation 4x Daily - RT Madi Griffith MD   2 puff at 09/08/20 0727   • AZITHROMYCIN 500 MG/250 ML 0.9% NS IVPB (vial-mate)  500 mg Intravenous Q24H Urbano Osborn MD   500 mg at 09/08/20 0530   • chlorhexidine (PERIDEX) 0.12 % solution 15 mL  15 mL Mouth/Throat Q12H Madi Griffith MD   15 mL at 09/07/20 2001   • enoxaparin (LOVENOX) syringe 40 mg  40 mg Subcutaneous Q24H Urbano Osborn MD   40 mg at 09/07/20 0803   • famotidine (PEPCID) injection 20 mg  20 mg Intravenous Q12H Madi Griffith MD   20 mg at 09/07/20 2001   • fentaNYL citrate (PF) (SUBLIMAZE) injection 25 mcg  25 mcg Intravenous Q2H PRN Iker Parker MD       • furosemide (LASIX) injection 20 mg  20 mg Intravenous Q6H Marija Sneed APRN   20 mg at 09/08/20 0149   • ipratropium (ATROVENT HFA) inhaler 2 puff  2 puff Inhalation 4x Daily - RT Madi Griffith MD   2 puff at 09/08/20 0727   • lactated ringers infusion  30 mL/hr Intravenous Continuous Marija Sneed APRN 30 mL/hr at 09/08/20 0417 30 mL/hr at 09/08/20 0417   • midazolam (VERSED) 50mg/100mL normal saline infusion  1-10 mg/hr Intravenous Titrated Urbano Osborn MD 16 mL/hr at 09/08/20 0640 8 mg/hr at 09/08/20 0640   • nicotine (NICODERM CQ) 14 MG/24HR patch 1 patch  1 patch Transdermal Q24H Iker Parker MD   1 patch at 09/07/20 1121   • Pharmacy to dose vancomycin   Does not apply Continuous Iker Parker MD       • propofol (DIPRIVAN) infusion 10 mg/mL 100 mL  5-50 mcg/kg/min Intravenous Titrated Vic Rodriguez MD   Stopped at 09/08/20 0445   • sodium chloride 0.9 % flush 10 mL  10 mL Intravenous PRN Vic Rodriguez MD       • sodium chloride 0.9 % flush 10 mL  10 mL Intravenous Q12H Urbano Osborn MD   10 mL at 09/07/20 2003   • sodium chloride 0.9 % flush 10 mL  10 mL Intravenous PRN Urbano Osborn  MD       • thiamine (B-1) 100 mg in sodium chloride 0.9 % 100 mL IVPB  100 mg Intravenous Daily Iker Parker  mL/hr at 09/07/20 1108 100 mg at 09/07/20 1108   • vancomycin 1500 mg/500 mL 0.9% NS IVPB ([facility])  1,500 mg Intravenous Q12H Iker Parker MD   1,500 mg at 09/08/20 0149       Past Surgical History:   Procedure Laterality Date   • APPENDECTOMY     • COLONOSCOPY N/A 12/20/2019    Procedure: COLONOSCOPY;  Surgeon: Dion Ambrocio DO;  Location: [facility] ENDOSCOPY;  Service: Gastroenterology       Social History     Socioeconomic History   • Marital status: Single     Spouse name: Not on file   • Number of children: Not on file   • Years of education: Not on file   • Highest education level: Not on file   Tobacco Use   • Smoking status: Former Smoker     Years: 2.00     Types: Cigars   • Smokeless tobacco: Never Used   Substance and Sexual Activity   • Alcohol use: Yes     Alcohol/week: 2.0 standard drinks     Types: 2 Cans of beer per week     Comment: 2 beer/wk maybe 6-8 on wknd   • Drug use: Not Currently   • Sexual activity: Defer       O/E    Neck: Supple.  No JVD, no thyroid enlargement.  Chest: Air entry equal, normal respiration.  No rhonchi or creps.  Cardiovascular system:  Regular rate and rhythm, 2/6 murmurs.  Abdomen: Soft, no tenderness, bowel sounds present, no hepatosplenomegaly.  CNS: Alert, oriented to place and time.  No motor or sensory deficit.  Cranial nerves intact.  Musculoskeletal: No deformity of the back or spine.  Extremities:  No edema.  Pulses equal on both sides.    Lab Results (last 24 hours)     Procedure Component Value Units Date/Time    CBC & Differential [[id number]] Collected:  09/08/20 0504    Specimen:  Blood Updated:  09/08/20 0516    Narrative:       The following orders were created for panel order CBC & Differential.  Procedure                               Abnormality         Status                     ---------                                -----------         ------                     CBC Auto Differential[[id number]]        Abnormal            Final result                 Please view results for these tests on the individual orders.    Comprehensive Metabolic Panel [[id number]]  (Abnormal) Collected:  09/08/20 0504    Specimen:  Blood Updated:  09/08/20 0536     Glucose 119 mg/dL      BUN 17 mg/dL      Creatinine 0.83 mg/dL      Sodium 140 mmol/L      Potassium 3.8 mmol/L      Chloride 106 mmol/L      CO2 27.0 mmol/L      Calcium 8.3 mg/dL      Total Protein 5.6 g/dL      Albumin 3.30 g/dL      ALT (SGPT) 21 U/L      AST (SGOT) 24 U/L      Alkaline Phosphatase 67 U/L      Total Bilirubin 1.0 mg/dL      eGFR Non African Amer 95 mL/min/1.73      Globulin 2.3 gm/dL      A/G Ratio 1.4 g/dL      BUN/Creatinine Ratio 20.5     Anion Gap 7.0 mmol/L     Narrative:       GFR Normal >60  Chronic Kidney Disease <60  Kidney Failure <15      Magnesium [[id number]]  (Normal) Collected:  09/08/20 0504    Specimen:  Blood Updated:  09/08/20 0536     Magnesium 2.1 mg/dL     Phosphorus [[id number]]  (Normal) Collected:  09/08/20 0504    Specimen:  Blood Updated:  09/08/20 0545     Phosphorus 4.1 mg/dL     CBC Auto Differential [[id number]]  (Abnormal) Collected:  09/08/20 0504    Specimen:  Blood Updated:  09/08/20 0516     WBC 9.22 10*3/mm3      RBC 4.30 10*6/mm3      Hemoglobin 14.2 g/dL      Hematocrit 41.0 %      MCV 95.3 fL      MCH 33.0 pg      MCHC 34.6 g/dL      RDW 12.4 %      RDW-SD 43.4 fl      MPV 11.3 fL      Platelets 174 10*3/mm3      Neutrophil % 72.5 %      Lymphocyte % 16.1 %      Monocyte % 9.0 %      Eosinophil % 1.7 %      Basophil % 0.4 %      Immature Grans % 0.3 %      Neutrophils, Absolute 6.68 10*3/mm3      Lymphocytes, Absolute 1.48 10*3/mm3      Monocytes, Absolute 0.83 10*3/mm3      Eosinophils, Absolute 0.16 10*3/mm3      Basophils, Absolute 0.04 10*3/mm3      Immature Grans, Absolute 0.03 10*3/mm3      nRBC 0.0 /100 WBC     Blood Gas,  Arterial [[id number]]  (Abnormal) Collected:  09/08/20 0646    Specimen:  Arterial Blood Updated:  09/08/20 0658     Site Right Radial     Lenny's Test Positive     pH, Arterial 7.478 pH units      Comment: 83 Value above reference range        pCO2, Arterial 32.8 mm Hg      Comment: 84 Value below reference range        pO2, Arterial 104.0 mm Hg      HCO3, Arterial 24.3 mmol/L      Base Excess, Arterial 1.4 mmol/L      O2 Saturation, Arterial 98.8 %      Barometric Pressure for Blood Gas 747 mmHg      Modality Ventilator     FIO2 35 %      Ventilator Mode PSV     PEEP 5.0     PSV 8.0 cmH2O      Collected by RT     Comment: Meter: [id number]     :  [id number]             A/P  AS-  No ACr due to Severe AS.  Decrease diuretics.  Extubated today.        This document has been electronically signed by Veronica Gonzalez MD on September 8, 2020 07:59         Part of this note may be an electronic transcription/translation of spoken language to printed text using the Dragon Dictation System.    Electronically signed by Veronica Gonzalez MD at 09/08/20 0801     Iker Parker MD at 09/07/20 1031              [facility] Medicine Services  INPATIENT PROGRESS NOTE    Length of Stay: 2  Date of Admission: 9/5/2020  Primary Care Physician: Tyler Singh MD    Subjective   Chief Complaint: Respiratory failure.    HPI: Patient is seen for follow-up.  He is 59-year-old male with history of nicotine dependence, alcoholism, BPH who presented to the emergency room  with a week history of progressive shortness of breath.  Of note patient was seen by his PCP on 8/22 complaining of 2-week history of epigastric pain.  He had worsening respiratory distress and was urgently intubated.     He remains intubated, sedated and restrained.    Review of Systems  Unable to review as the patient is intubated and sedated.  Objective    Temp:  [98.9 °F (37.2 °C)-99.9 °F (37.7 °C)] 99.3 °F  (37.4 °C)  Heart Rate:  [] 80  Resp:  [16-24] 19  BP: (103-127)/(63-80) 120/77  FiO2 (%):  [35 %] 35 %    Vent Settings:  FiO2 (%):  [35 %] 35 %  S RR:  [16-20] 16  PEEP/CPAP (cm H2O):  [5 cm H20] 5 cm H20  OR SUP:  [0 cm H20-8 cm H20] 0 cm H20  MAP (cm H2O):  [9-18] 18    Physical Exam   Constitutional: He appears well-developed and well-nourished. No distress. He is sedated, intubated and restrained.   HENT:   Head: Normocephalic and atraumatic.   Eyes: No scleral icterus.   Neck: Neck supple. No JVD present. No thyromegaly present.   Cardiovascular: Normal rate, regular rhythm and normal heart sounds. Exam reveals no gallop and no friction rub.   No murmur heard.  Pulmonary/Chest: Effort normal. He is intubated. He has decreased breath sounds. He has no wheezes. He has rhonchi. He has no rales. He exhibits no tenderness.   Abdominal: Soft. Bowel sounds are normal. He exhibits no distension and no mass. There is no tenderness. There is no rebound and no guarding.   Musculoskeletal: He exhibits no edema, tenderness or deformity.   Neurological: He exhibits normal muscle tone.   Skin: Skin is warm and dry. No rash noted. He is not diaphoretic. No erythema. No pallor.   Nursing note and vitals reviewed.        Medication Review:    Current Facility-Administered Medications:   •  albuterol sulfate HFA (PROVENTIL HFA;VENTOLIN HFA;PROAIR HFA) inhaler 2 puff, 2 puff, Inhalation, 4x Daily - RT, Madi Griffith MD, 2 puff at 09/07/20 0707  •  AZITHROMYCIN 500 MG/250 ML 0.9% NS IVPB (vial-mate), 500 mg, Intravenous, Q24H, Urbano Osborn MD, 500 mg at 09/07/20 0632  •  cefTRIAXone (ROCEPHIN) 1 g/100 mL 0.9% NS (MBP), 1 g, Intravenous, Q24H, Urbano Osborn MD, 1 g at 09/07/20 0559  •  chlorhexidine (PERIDEX) 0.12 % solution 15 mL, 15 mL, Mouth/Throat, Q12H, Madi Griffith MD, 15 mL at 09/07/20 0803  •  enoxaparin (LOVENOX) syringe 40 mg, 40 mg, Subcutaneous, Q24H,  Urbano Osborn MD, 40 mg at 09/07/20 0803  •  famotidine (PEPCID) injection 20 mg, 20 mg, Intravenous, Q12H, Madi Griffith MD, 20 mg at 09/07/20 0803  •  fentaNYL citrate (PF) (SUBLIMAZE) injection 25 mcg, 25 mcg, Intravenous, Q2H PRN, Iker Parker MD  •  furosemide (LASIX) injection 20 mg, 20 mg, Intravenous, Q6H, Marija Sneed APRN  •  ipratropium (ATROVENT HFA) inhaler 2 puff, 2 puff, Inhalation, 4x Daily - RT, Madi Griffith MD, 2 puff at 09/07/20 0707  •  lactated ringers infusion, 30 mL/hr, Intravenous, Continuous, Marija Sneed APRN, Last Rate: 30 mL/hr at 09/07/20 0921, 30 mL/hr at 09/07/20 0921  •  midazolam (VERSED) 50mg/100mL normal saline infusion, 1-10 mg/hr, Intravenous, Titrated, Urbano Osborn MD, Last Rate: 20 mL/hr at 09/07/20 0351, 10 mg/hr at 09/07/20 0351  •  potassium chloride (KLOR-CON) packet 40 mEq, 40 mEq, Oral, BID, Veronica Gonzalez MD, 40 mEq at 09/07/20 0803  •  propofol (DIPRIVAN) infusion 10 mg/mL 100 mL, 5-50 mcg/kg/min, Intravenous, Titrated, Vic Rodriguez MD, Last Rate: 16.33 mL/hr at 09/06/20 2304, 30 mcg/kg/min at 09/06/20 2304  •  sodium chloride 0.9 % flush 10 mL, 10 mL, Intravenous, PRN, Vic Rodriguez MD  •  sodium chloride 0.9 % flush 10 mL, 10 mL, Intravenous, Q12H, Urbano Osborn MD, 10 mL at 09/07/20 0803  •  sodium chloride 0.9 % flush 10 mL, 10 mL, Intravenous, PRN, Urbano Osborn MD  •  thiamine (B-1) 100 mg in sodium chloride 0.9 % 100 mL IVPB, 100 mg, Intravenous, Daily, Iker Parker MD    Results Review:  I have reviewed the labs, radiology results, and diagnostic studies.    Laboratory Data:   Results from last 7 days   Lab Units 09/07/20  0322 09/06/20  0219 09/05/20  0437   SODIUM mmol/L 139 140 140   POTASSIUM mmol/L 3.7 3.6 3.4*   CHLORIDE mmol/L 106 105 103   CO2 mmol/L 26.0 25.0 25.0   BUN mg/dL 16 18 12   CREATININE mg/dL 0.88 0.96 1.06   GLUCOSE  mg/dL 97 101* 191*   CALCIUM mg/dL 8.0* 8.1* 8.6   BILIRUBIN mg/dL 0.8 0.7 1.5*   ALK PHOS U/L 60 62 81   ALT (SGPT) U/L 17 19 25   AST (SGOT) U/L 23 27 26   ANION GAP mmol/L 7.0 10.0 12.0     Estimated Creatinine Clearance: 114 mL/min (by C-G formula based on SCr of 0.88 mg/dL).  Results from last 7 days   Lab Units 09/07/20  0322 09/06/20  0219   MAGNESIUM mg/dL 2.0 2.0   PHOSPHORUS mg/dL 2.9 4.1         Results from last 7 days   Lab Units 09/07/20  0322 09/06/20  0219 09/05/20  0438   WBC 10*3/mm3 9.98 13.76* 12.29*   HEMOGLOBIN g/dL 13.2 13.6 16.0   HEMATOCRIT % 38.2 37.5 45.5   PLATELETS 10*3/mm3 158 188 256           Culture Data:   Blood Culture   Date Value Ref Range Status   09/06/2020 Abnormal Stain (C)  Preliminary   09/05/2020 No growth at 2 days  Preliminary     No results found for: URINECX  Respiratory Culture   Date Value Ref Range Status   09/05/2020   Final    Rare Normal Respiratory Christen: NO S.aureus/MRSA or Pseudomonas aeruginosa     No results found for: WOUNDCX  No results found for: STOOLCX  No components found for: BODYFLD    Radiology Data:   Imaging Results (Last 24 Hours)     ** No results found for the last 24 hours. **          ABG:  Results from last 7 days   Lab Units 09/07/20  0707   PH, ARTERIAL pH units 7.464*   PO2 ART mm Hg 73.3*   PCO2, ARTERIAL mm Hg 38.1   HCO3 ART mmol/L 27.3*       I have reviewed the patient's current medications.     Assessment/Plan     Hospital Problem List:  Principal Problem:    Acute respiratory failure with hypoxia (CMS/[facility])  Active Problems:    Aortic stenosis, severe    Acute systolic CHF (congestive heart failure) (CMS/HCC)    Acute respiratory failure with hypoxia (secondary to bilateral infiltrate/acute systolic heart failure): Continue ventilatory support, antimicrobial therapy, inhalers with daily SBT.    Acute systolic congestive heart failure (likely due to severe aortic stenosis): Continue diuretics with strict I's and O's, daily weights,  salt and fluid restriction.  Patient will require right heart and left heart catheterization post extubation.  Elevated troponin has been attributed to acute illness.   Repeat chest x-ray in a.m.  Input by cardiologist appreciated.  Echocardiogram done 9/5/2020 showed:  · Left ventricular wall thickness is consistent with mild concentric hypertrophy.  · There is severe calcification of the aortic valve.  · Mild aortic valve regurgitation is present.  · Severe aortic valve stenosis is present.  · EF appears decreased at approx 36-40%.     Sepsis secondary to bilateral pneumonia: Patient did screen positive for sepsis.  Continue IV antibiotics.  Blood culture shows gram-positive cocci in clusters and respiratory culture showed mixed bacterial herlinda.  Reactive leukocytosis has resolved.    Nicotine dependence: Begin nicotine patch.    History of alcoholism: Patient is sedated and not in withdrawal.  Begin prophylactic thiamine.    Nutrition: Consult dietitian for NG tube feeding.    Continue GI and DVT prophylaxis.    Discharge Planning: In progress.    Iker Parker MD   09/07/20   10:31        Electronically signed by Iker Parker MD at 09/07/20 1441     Veronica Gonzalez MD at 09/07/20 0816          [facility] Cardiology Progress Note   LOS: 2 days   Patient Care Team:  Tyler Singh MD as PCP - General (Family Medicine)    Chief Complaint:  dyspnea    Subjective     Interval History:   Patient intubated and sedated. Chart reviewed. Spoke to nurse.   I&O is postive overnight. According to RN, he diureses in the AM with the Lasix 40mg but then drops off after that. Will adjust Lasix to aide in more gentle, continuous diuresis to avoid hypovolemia in the setting of severe aortic stenosis. Holding CHF medications one more day.     Objective     Vital Sign Min/Max for last 24 hours  Temp  Min: 98.9 °F (37.2 °C)  Max: 99.9 °F (37.7 °C)   BP  Min: 103/67  Max: 127/74   Pulse  Min: 76  Max: 101   Resp  Min:  "16  Max: 24   SpO2  Min: 94 %  Max: 97 %   No data recorded   Weight  Min: 89.2 kg (196 lb 10.4 oz)  Max: 89.2 kg (196 lb 10.4 oz)     Flowsheet Rows      First Filed Value   Admission Height  182.9 cm (72\") Documented at 09/05/2020 0427   Admission Weight  90.7 kg (200 lb) Documented at 09/05/2020 0427            09/05/20  1115 09/06/20  0400 09/07/20  0400   Weight: 91.3 kg (201 lb 4.5 oz) 91.9 kg (202 lb 9.6 oz) 89.2 kg (196 lb 10.4 oz)       Physical Exam:  Physical Exam   Constitutional: He appears well-developed and well-nourished. No distress.   HENT:   Head: Normocephalic.   Eyes: Conjunctivae are normal.   Neck: JVD (12cm) present.   Cardiovascular: Normal rate, regular rhythm, S1 normal, S2 normal, normal heart sounds and intact distal pulses. Exam reveals no gallop and no friction rub.   No murmur heard.  Pulmonary/Chest: Effort normal and breath sounds normal. No respiratory distress. He has no wheezes. He has no rales.   Abdominal: Soft. Bowel sounds are normal. He exhibits no distension.   Musculoskeletal: Normal range of motion. He exhibits no edema.   Skin: Skin is warm and dry. He is not diaphoretic. No erythema.   Psychiatric: He has a normal mood and affect. His behavior is normal.   Nursing note and vitals reviewed.       Results Review:     Results from last 7 days   Lab Units 09/07/20  0322 09/06/20  0219 09/05/20  0437   SODIUM mmol/L 139 140 140   POTASSIUM mmol/L 3.7 3.6 3.4*   CHLORIDE mmol/L 106 105 103   CO2 mmol/L 26.0 25.0 25.0   BUN mg/dL 16 18 12   CREATININE mg/dL 0.88 0.96 1.06   CALCIUM mg/dL 8.0* 8.1* 8.6   BILIRUBIN mg/dL 0.8 0.7 1.5*   ALK PHOS U/L 60 62 81   ALT (SGPT) U/L 17 19 25   AST (SGOT) U/L 23 27 26   GLUCOSE mg/dL 97 101* 191*       Estimated Creatinine Clearance: 114 mL/min (by C-G formula based on SCr of 0.88 mg/dL).    Results from last 7 days   Lab Units 09/07/20  0322 09/06/20  0219   MAGNESIUM mg/dL 2.0 2.0   PHOSPHORUS mg/dL 2.9 4.1             Results from last " 7 days   Lab Units 09/07/20  0322 09/06/20  0219 09/05/20  0438   WBC 10*3/mm3 9.98 13.76* 12.29*   HEMOGLOBIN g/dL 13.2 13.6 16.0   PLATELETS 10*3/mm3 158 188 256           Lab Results   Component Value Date    PROBNP 3,740.0 (H) 09/05/2020       I/O last 3 completed shifts:  In: 5666.3 [I.V.:5066.3; IV Piggyback:600]  Out: 4275 [Urine:3375; Emesis/NG output:900]    Cardiographics:  ECG/EMG Results (last 24 hours)     ** No results found for the last 24 hours. **        Results for orders placed during the hospital encounter of 09/05/20   Adult Transthoracic Echo Complete W/ Cont if Necessary Per Protocol    Narrative · Left ventricular wall thickness is consistent with mild concentric   hypertrophy.  · There is severe calcification of the aortic valve.  · Mild aortic valve regurgitation is present.  · Severe aortic valve stenosis is present.  · EF appears decreased at approx 36-40%.          Xr Chest 1 View    Result Date: 9/5/2020  Extensive pulmonary opacities likely edema and effusions Electronically signed by:  Wally Orona MD  9/5/2020 5:32 AM CDT Workstation: [id number]      Medication Review:     Current Facility-Administered Medications:   •  albuterol sulfate HFA (PROVENTIL HFA;VENTOLIN HFA;PROAIR HFA) inhaler 2 puff, 2 puff, Inhalation, 4x Daily - RT, Madi Griffith MD, 2 puff at 09/07/20 0707  •  AZITHROMYCIN 500 MG/250 ML 0.9% NS IVPB (vial-mate), 500 mg, Intravenous, Q24H, Urbano Osborn MD, 500 mg at 09/07/20 0632  •  cefTRIAXone (ROCEPHIN) 1 g/100 mL 0.9% NS (MBP), 1 g, Intravenous, Q24H, Urbano Osborn MD, 1 g at 09/07/20 0559  •  chlorhexidine (PERIDEX) 0.12 % solution 15 mL, 15 mL, Mouth/Throat, Q12H, Madi Griffith MD, 15 mL at 09/07/20 0803  •  enoxaparin (LOVENOX) syringe 40 mg, 40 mg, Subcutaneous, Q24H, Urbano Osborn MD, 40 mg at 09/07/20 0803  •  famotidine (PEPCID) injection 20 mg, 20 mg, Intravenous, Q12H, Gladis,  Madi Humphries MD, 20 mg at 09/07/20 0803  •  furosemide (LASIX) injection 40 mg, 40 mg, Intravenous, Daily, Veronica Gonzalez MD, 40 mg at 09/07/20 0803  •  ipratropium (ATROVENT HFA) inhaler 2 puff, 2 puff, Inhalation, 4x Daily - RT, Kavin-Dequincy, Madi Humphries MD, 2 puff at 09/07/20 0707  •  lactated ringers infusion, 100 mL/hr, Intravenous, Continuous, Urbano Osborn MD, Last Rate: 100 mL/hr at 09/06/20 2345, 100 mL/hr at 09/06/20 2345  •  midazolam (VERSED) 50mg/100mL normal saline infusion, 1-10 mg/hr, Intravenous, Titrated, Urbano Osborn MD, Last Rate: 20 mL/hr at 09/07/20 0351, 10 mg/hr at 09/07/20 0351  •  norepinephrine (LEVOPHED) 8 mg/250 mL (32 mcg/mL) in sodium chloride 0.9% infusion (premix), 0.02-0.3 mcg/kg/min, Intravenous, Titrated, Vic Rodriguez MD, Stopped at 09/05/20 1045  •  potassium chloride (KLOR-CON) packet 40 mEq, 40 mEq, Oral, BID, Veronica Gonzalez MD, 40 mEq at 09/07/20 0803  •  propofol (DIPRIVAN) infusion 10 mg/mL 100 mL, 5-50 mcg/kg/min, Intravenous, Titrated, Vic Rodriguez MD, Last Rate: 16.33 mL/hr at 09/06/20 2304, 30 mcg/kg/min at 09/06/20 2304  •  sodium chloride 0.9 % flush 10 mL, 10 mL, Intravenous, PRN, Vic Rodrigeuz MD  •  sodium chloride 0.9 % flush 10 mL, 10 mL, Intravenous, Q12H, Urbano Osborn MD, 10 mL at 09/07/20 0803  •  sodium chloride 0.9 % flush 10 mL, 10 mL, Intravenous, PRN, Urbano Osborn MD    Assessment/Plan       Acute respiratory failure with hypoxia (CMS/HCC)  - vent management per Hospitalist team.        Acute systolic CHF (congestive heart failure) (CMS/HCC)  first presentation of moderate systolic heart failure. Etiology: NICM/ICM. LVEF 36%.  NYHA stage C. FC-IV.   Patient's fluid overload, LV dysfunction is all secondary to aortic stenoses.  The patient I think is probably over diuresis and therefore why his blood pressure is low.    Most feel that ACE and arms are not indicated in a  patient with the severity of aortic stenosis due to the pressure difference between the left ventricle and the aorta.  Patient is currently Patient is currently volume overloaded. and with  Moderate perfusion. The patient's hemodynamics are currently acceptable.   -BB:  On hold at this time. -ACE/ARB/ENTRESTO: on hold for hemodynamics  - DIGOXIN: n/a  - IMDUR/HYDRALAZINE: n/a  -DIURETIC: Change lasix to 20mg q6hr IV for better continues diuresis. BP still soft with Versed and Proprofol   -MRA: The patient is FC-NYHA Class IV and MRA is indicated. On hold for hemodynamics    -Sodium restriction: 2000mg day   -ICD: Not indicated as the patient has severe aortic stenosis and LV function should return to normal once this is fixed.  -AHF: not indicated  -LVAD: not indicated    - will need MARGO and LHC at some point when more medically stable to continue work up for aortic stenosis. His CHRISSY is 0.74.   - cut back on IVFs to KVO. Less likely a sepsis component and more acute CHF. I&O was    - Hold TF until tomorrow to avoid more volume during diureses.          Aortic stenosis, severe  - will need RHC /LHC to complete work up. This is not done during his acute decompensation. Will await improvement in his course and proceed. Further orders to come.         Plan for disposition:Where: Continue current location     Patient seen and examined and changes were made according to above.          This document has been electronically signed by FRANCISCA Denney on September 7, 2020 08:16          Electronically signed by Veronica Gonzalez MD at 09/07/20 1001     Madi Griffith MD at 09/06/20 1042          .      [facility] Medicine Services  INPATIENT PROGRESS NOTE    Length of Stay: 1  Date of Admission: 9/5/2020  Primary Care Physician: Tyler Singh MD      HPI:    a 59-year-old gentleman who presented the ED carrying the following problem list   .   Hypertension  History of BPH  History of appendectomy History of tobacco use predominately a cigar exposure no history of cigarette use     Patient presents with a week history of progressive shortness of breath.  Of note patient was seen by his PCP on 8/22 complaining of 2-week history of epigastric pain.  Regardless patient brought in with increased respiratory rate and oxygen urgently intubated.  History is obtained from ED physician.  Patient denied any history of fevers, chills cough.  Denies any viral type symptomatology.  Patient's chest x-ray with bilateral process.  His white count was mildly elevated he had no fever although the patient post intubation is requiring vasopressors.  Of note he does have some EKG changes inferiorly bilaterally with initial troponin normal.      Subjective:  Patient seen and evaluated on rounds today in the ICU. Remains on the vent. Has been able to wean down on the FiO2. Per nursing is responding to the lasix. Sedation is appropriate.     Review of Systems     All pertinent negatives and positives are as above. All other systems have been reviewed and are negative unless otherwise stated.     Objective    Temp:  [98.5 °F (36.9 °C)-99.2 °F (37.3 °C)] 98.5 °F (36.9 °C)  Heart Rate:  [77-93] 81  Resp:  [20-22] 20  BP: ()/(56-84) 104/64  FiO2 (%):  [35 %-60 %] 35 %    Physical Exam  *Constitutional:  well-developed and well-nourished. Sedated on the ventilator  HENT:   Head: Atraumatic.  Normocephalic  Right Ear: External ear normal.   Left Ear: External ear normal.   Nose: Nose normal.   Neck: No JVD present.   Cardiovascular: Regular rhythm, normal heart sounds and intact distal pulses.   tachycardia   Pulmonary/Chest:   Decreased throughout, rhonchi noted, no wheezes no rales   Abdominal: Soft. Bowel sounds are normal.   Musculoskeletal: He exhibits no edema or deformity.   Neurological:   Sedated on the vent   Skin: Skin is warm and dry.   Psychiatric:   Unable to  assess sedated on the vent   Nursing note and vitals reviewed.      Results Review:  I have reviewed the labs, radiology results, and diagnostic studies.    Laboratory Data:   Results from last 7 days   Lab Units 09/06/20 0219 09/05/20  0437   SODIUM mmol/L 140 140   POTASSIUM mmol/L 3.6 3.4*   CHLORIDE mmol/L 105 103   CO2 mmol/L 25.0 25.0   BUN mg/dL 18 12   CREATININE mg/dL 0.96 1.06   GLUCOSE mg/dL 101* 191*   CALCIUM mg/dL 8.1* 8.6   BILIRUBIN mg/dL 0.7 1.5*   ALK PHOS U/L 62 81   ALT (SGPT) U/L 19 25   AST (SGOT) U/L 27 26   ANION GAP mmol/L 10.0 12.0     Estimated Creatinine Clearance: 107.7 mL/min (by C-G formula based on SCr of 0.96 mg/dL).  Results from last 7 days   Lab Units 09/06/20  0219   MAGNESIUM mg/dL 2.0   PHOSPHORUS mg/dL 4.1         Results from last 7 days   Lab Units 09/06/20 0219 09/05/20  0438   WBC 10*3/mm3 13.76* 12.29*   HEMOGLOBIN g/dL 13.6 16.0   HEMATOCRIT % 37.5 45.5   PLATELETS 10*3/mm3 188 256           Culture Data:   Blood Culture   Date Value Ref Range Status   09/05/2020 No growth at 24 hours  Preliminary     No results found for: URINECX  Respiratory Culture   Date Value Ref Range Status   09/05/2020   Preliminary    Rare Normal Respiratory Christen: NO S.aureus/MRSA or Pseudomonas aeruginosa     No results found for: WOUNDCX  No results found for: STOOLCX  No components found for: BODYFLD    Radiology Data:   Imaging Results (Last 24 Hours)     ** No results found for the last 24 hours. **          I have reviewed the patient's current medications.     Assessment/Plan     Active Hospital Problems    Diagnosis   • Acute respiratory failure with hypoxia (CMS/[facility])       Plan:  **    Respiratory    Acute hypoxic respiratory failure with bilateral pulmonary infiltrates  Currently on rocephin and azithromycin  -Initiated broad-spectrum antibiotics in the ED will continue  - will continue ventilator today and consider weaning tomorrow.   -covid is negative  Blood cultures negative to  date  Lung Cx respiratory herlinda    Cardiovascular  Elevated troponin-  Appreciate Dr Gonzalez seeing the patient. Appears that he has sever aortic stenosis which is contributing to the hypoxia and elevation of the troponin. Will monitor for now and will need a cath once the acute events are resolve.      Neurologic-   Sedated on the vent    GI  On pepcid for prophylaxis    Renal   Responding well to lasix  BUN abd creatinine stable will monitor    F/E/Nutrition  If not able to come off the vent consider feedings. (but is weaning nicely at this point)         critical care time 75 minutes spent in the evaluation of patient, formulation and implementation of treatment plan and discussion with care team, staff and others providers.       The patient was evaluated during the global COVID-19 pandemic, and the diagnosis was suspected/considered upon their initial presentation.  Evaluation, treatment, and testing were consistent with current guidelines for patients who present with complaints or symptoms that may be related to COVID-19.        Electronically signed by Madi Griffith MD at 09/06/20 1131     Madi Griffith MD at 09/05/20 1315          .      [facility] Medicine Services  INPATIENT PROGRESS NOTE    Length of Stay: 0  Date of Admission: 9/5/2020  Primary Care Physician: Tyler Singh MD    Subjective     Review of chart taking over from Night shift Physician    HPI:*Briefly this is a 59-year-old gentleman who presented the ED carrying the following problem list     1.  Hypertension  2.  History of BPH  3.  History of appendectomy  4.  History of tobacco use predominately a cigar exposure no history of cigarette use     Patient presents with a week history of progressive shortness of breath.  Of note patient was seen by his PCP on 8/22 complaining of 2-week history of epigastric pain.  Regardless patient brought in with  increased respiratory rate and oxygen urgently intubated.  History is obtained from ED physician.  Patient denied any history of fevers, chills cough.  Denies any viral type symptomatology.  Patient's chest x-ray with bilateral process.  His white count was mildly elevated he had no fever although the patient post intubation is requiring vasopressors.  Of note he does have some EKG changes inferiorly bilaterally with initial troponin normal.    Subjective: Patient seen and evaluated on rounds. Chart reviewed. Sedated on on vent. Discussed case with nurse    Review of Systems   Unable to obtain due to sedated on vent    Objective    Temp:  [99.1 °F (37.3 °C)] 99.1 °F (37.3 °C)  Heart Rate:  [] 80  Resp:  [26-32] 32  BP: ()/() 94/62  FiO2 (%):  [100 %] 100 %    Physical Exam   Constitutional: He appears well-developed and well-nourished.   HENT:   Head: Atraumatic.   Right Ear: External ear normal.   Left Ear: External ear normal.   Nose: Nose normal.   Neck: No JVD present.   Cardiovascular: Regular rhythm, normal heart sounds and intact distal pulses.   tachycardia   Pulmonary/Chest:   Decreased throughout, rhonchi noted, no wheezes no rales   Abdominal: Soft. Bowel sounds are normal.   Musculoskeletal: He exhibits no edema or deformity.   Neurological:   Sedated on the vent   Skin: Skin is warm and dry.   Psychiatric:   Unable to assess sedated on the vent   Nursing note and vitals reviewed.          Results Review:  I have reviewed the labs, radiology results, and diagnostic studies.    Laboratory Data:   Results from last 7 days   Lab Units 09/05/20  0437   SODIUM mmol/L 140   POTASSIUM mmol/L 3.4*   CHLORIDE mmol/L 103   CO2 mmol/L 25.0   BUN mg/dL 12   CREATININE mg/dL 1.06   GLUCOSE mg/dL 191*   CALCIUM mg/dL 8.6   BILIRUBIN mg/dL 1.5*   ALK PHOS U/L 81   ALT (SGPT) U/L 25   AST (SGOT) U/L 26   ANION GAP mmol/L 12.0     Estimated Creatinine Clearance: 96.9 mL/min (by C-G formula based on SCr  of 1.06 mg/dL).          Results from last 7 days   Lab Units 09/05/20  0438   WBC 10*3/mm3 12.29*   HEMOGLOBIN g/dL 16.0   HEMATOCRIT % 45.5   PLATELETS 10*3/mm3 256           Culture Data:   No results found for: BLOODCX  No results found for: URINECX  No results found for: RESPCX  No results found for: WOUNDCX  No results found for: STOOLCX  No components found for: BODYFLD    Radiology Data:   Imaging Results (Last 24 Hours)     Procedure Component Value Units Date/Time    XR Chest 1 View [[id number]] Collected:  09/05/20 0502     Updated:  09/05/20 0533    Narrative:       PORTABLE CHEST X-RAY    CLINICAL HISTORY: respiratory distress    COMPARISON: None.    FINDINGS: Portable AP view of the chest was obtained with  overlying monitor leads in place. ET tube terminates at the level  of the mid thoracic trachea. Nasogastric tube extends below the  diaphragm but the tip is not imaged. There are diffuse  groundglass and interstitial opacities likely severe edema.  Superimposed pneumonia cannot be entirely excluded. There are  small bilateral effusions. Borderline cardiomegaly.      Impression:       Extensive pulmonary opacities likely edema and  effusions      Electronically signed by:  Wally Orona MD  9/5/2020 5:32 AM CDT  Workstation: [id number]          I have reviewed the patient's current medications.     Assessment/Plan     Active Hospital Problems    Diagnosis   • Acute respiratory failure with hypoxia (CMS/HCC)       Plan:    1.  Acute hypoxic respiratory failure with bilateral pulmonary infiltrates  -Initiated broad-spectrum antibiotics in the ED will continue  - Marked concern for underlying recent cardiovascular event with 2 weeks of epigastric pain/possibility of prior at least impaired fasting glucose if not diabetes and age.  Will check echocardiogram and 1 dose of loop diuretic and have cardiology to see  --  covid is negative  Weaning down on O2 requirements     2.  Abnormal EKG  - See impression  inferiorly  -We will rule out overnight  -As above cannot rule out recent myocardial event  -Check echocardiogram for abnormal LV function with question rec  -Rule out overnight with troponin again at 1000 hrs. and 1400 hrs. with initial troponin normal    --repeat  troponins are trending up. Most likely strain from respiratory status. Will consult  Cardiology. . Will monitor for now    3.  Elevated random blood sugar  - Not known to have impaired fasting glucose or diabetes  -Likely related to stress  -Check A1c    Will monitor       4.  Hypertension  -Hold antihypertensive medication with patient hypertensive currently  CODE STATUS patient is a full code          Electronically signed by Madi Griffith MD at 09/05/20 1432       Medical Student Notes (last 7 days) (Notes from 09/01/20 1204 through 09/08/20 1204)    No notes of this type exist for this encounter.            Consult Notes (last 7 days) (Notes from 09/01/20 through 09/08/20)      Veronica Gonzalez MD at 09/06/20 0841      Consult Orders    1. Inpatient Cardiology Consult [[id number]] ordered by Madi Griffith MD at 09/05/20 1424                  [facility] Cardiology  INPATIENT CONSULT NOTE  Mark Guan Ryanwallace  59 y.o. male    Referring Provider: Vic Rodriguez, *    Reason for the consult: Elevated troponin      Chief complaint: Dyspnea    History of present Illness:  Patient came in with increasing shortness of air.  He denies any chest pain.  He was struggling breathing to the point that he was put on a ventilator.  He had fluid overload.  He is received IV Lasix with good diuresis.  2D echo was done and this revealed severe aortic stenosis.  He has a heavily calcified valve.  There is no prior history of him knowing this.  He is intubated and cannot give any further history.          Allergies:   Allergies   Allergen Reactions   • Metoprolol Rash         Past Medical  History:   Diagnosis Date   • Hypertension          Past Surgical History:   Procedure Laterality Date   • APPENDECTOMY     • COLONOSCOPY N/A 12/20/2019    Procedure: COLONOSCOPY;  Surgeon: Dion Ambrocio DO;  Location: [facility] ENDOSCOPY;  Service: Gastroenterology         No family history on file.      Social History     Socioeconomic History   • Marital status: Single     Spouse name: Not on file   • Number of children: Not on file   • Years of education: Not on file   • Highest education level: Not on file   Tobacco Use   • Smoking status: Former Smoker     Years: 2.00     Types: Cigars   • Smokeless tobacco: Never Used   Substance and Sexual Activity   • Alcohol use: Yes     Alcohol/week: 2.0 standard drinks     Types: 2 Cans of beer per week     Comment: 2 beer/wk maybe 6-8 on wknd   • Drug use: Not Currently   • Sexual activity: Defer         Current Facility-Administered Medications   Medication Dose Route Frequency Provider Last Rate Last Dose   • albuterol sulfate HFA (PROVENTIL HFA;VENTOLIN HFA;PROAIR HFA) inhaler 2 puff  2 puff Inhalation 4x Daily - RT Madi Griffith MD   2 puff at 09/06/20 0756   • AZITHROMYCIN 500 MG/250 ML 0.9% NS IVPB (vial-mate)  500 mg Intravenous Q24H Urbano Osborn MD   500 mg at 09/06/20 0531   • cefTRIAXone (ROCEPHIN) 1 g/100 mL 0.9% NS (MBP)  1 g Intravenous Q24H Urbano Osborn MD   1 g at 09/06/20 0515   • enoxaparin (LOVENOX) syringe 40 mg  40 mg Subcutaneous Q24H Urbano Osborn MD   40 mg at 09/05/20 0958   • famotidine (PEPCID) injection 20 mg  20 mg Intravenous Q12H Madi Griffith MD   20 mg at 09/05/20 2039   • furosemide (LASIX) injection 40 mg  40 mg Intravenous Daily Veronica Gonzalez MD       • ipratropium (ATROVENT HFA) inhaler 2 puff  2 puff Inhalation 4x Daily - RT Madi Griffith MD   2 puff at 09/06/20 0757   • lactated ringers infusion  100 mL/hr Intravenous Continuous  "Urbano Osborn  mL/hr at 09/06/20 0234 100 mL/hr at 09/06/20 0234   • midazolam (VERSED) 50mg/100mL normal saline infusion  1-10 mg/hr Intravenous Titrated Urbano Osborn MD 20 mL/hr at 09/06/20 0241 10 mg/hr at 09/06/20 0241   • norepinephrine (LEVOPHED) 8 mg/250 mL (32 mcg/mL) in sodium chloride 0.9% infusion (premix)  0.02-0.3 mcg/kg/min Intravenous Titrated Vic Rodriguez MD   Stopped at 09/05/20 1045   • potassium chloride (KLOR-CON) packet 40 mEq  40 mEq Oral BID Veronica Gonzalez MD       • propofol (DIPRIVAN) infusion 10 mg/mL 100 mL  5-50 mcg/kg/min Intravenous Titrated Vic Rodriguez MD 27.2 mL/hr at 09/06/20 0511 50 mcg/kg/min at 09/06/20 0511   • sodium chloride 0.9 % flush 10 mL  10 mL Intravenous PRN Vic Rodriguez MD       • sodium chloride 0.9 % flush 10 mL  10 mL Intravenous Q12H Urbano Osborn MD   10 mL at 09/05/20 2206   • sodium chloride 0.9 % flush 10 mL  10 mL Intravenous PRN Urbano Osborn MD             Review of Systems:     Constitution:  Denies any fatigue, fever or chills.    HENT:  Denies any headache, hearing impairment.    Eyes:  Denies any blurring of vision, or photophobia.     Cardiovascular:  As per history of present illness.     Respiratory:  Denies any COPD, shortness of breath.     Endocrine:  No history of hyperlipidemia, diabetes.       Musculoskeletal:  No history of arthritis with musculoskeletal problems.    Gastrointestinal:  No nausea, vomiting, or melena.    Genitourinary:  No dysuria or hematuria.    Neurological:  No history of seizure disorder, stroke, or memory problems.    Psychiatric/Behavioral:  No history of depression, bipolar disorder or schizophrenia.     Hematological:  No history of easy bruising.            OBJECTIVE:    /64   Pulse 78   Temp 98.5 °F (36.9 °C) (Axillary)   Resp 20   Ht 182.9 cm (72.01\")   Wt 91.9 kg (202 lb 9.6 oz)   SpO2 95%   BMI 27.47 kg/m²        Physical Exam: "     Constitutional:  Well developed and nourished, on ventilator sedated.  Skin:  Warm and dry.     Head:  Normocephalic and atraumatic.     Eyes:  Pupils are equal, round, and reactive to light.     Neck:  Neck supple. No bruit in the carotids.  No elevation of JVD.    Cardiovascular:  Ackworth in the fifth intercostal space, regular rate, and rhythm.  S1 greater than S2, no S3 or S4, no gallop.  There is a 2/6 systolic ejection murmur at the upper sternal border    Pulmonary/Chest:  Air entry is equal on both sides.  No wheezing or crackles.      Abdominal:  Soft. No hepatosplenomegaly, bowel sounds are present.    Musculoskeletal:  No kyphoscoliosis.    Neurological: Sedated.  Cranial nerves are intact.  No motor or sensory deficit.    Extremities: Trace edema, no radial femoral delay.    Psychiatric:  The patient has a normal mood and affect.  Behavior is normal.        Lab Results (last 24 hours)     Procedure Component Value Units Date/Time    Troponin [[id number]]  (Abnormal) Collected:  09/05/20 0932    Specimen:  Blood Updated:  09/05/20 1026     Troponin T 0.116 ng/mL     Narrative:       Troponin T Reference Range:  <= 0.03 ng/mL-   Negative for AMI  >0.03 ng/mL-     Abnormal for myocardial necrosis.  Clinicians would have to utilize clinical acumen, EKG, Troponin and serial changes to determine if it is an Acute Myocardial Infarction or myocardial injury due to an underlying chronic condition.       Results may be falsely decreased if patient taking Biotin.      Lactic Acid, Reflex [[id number]]  (Normal) Collected:  09/05/20 0932    Specimen:  Blood Updated:  09/05/20 1007     Lactate 1.0 mmol/L     Troponin [[id number]]  (Abnormal) Collected:  09/05/20 1335    Specimen:  Blood Updated:  09/05/20 1416     Troponin T 0.221 ng/mL     Narrative:       Troponin T Reference Range:  <= 0.03 ng/mL-   Negative for AMI  >0.03 ng/mL-     Abnormal for myocardial necrosis.  Clinicians would have to utilize clinical acumen,  EKG, Troponin and serial changes to determine if it is an Acute Myocardial Infarction or myocardial injury due to an underlying chronic condition.       Results may be falsely decreased if patient taking Biotin.      Urinalysis With Culture If Indicated - Urine, Catheter [[id number]]  (Abnormal) Collected:  09/05/20 1553    Specimen:  Urine, Catheter Updated:  09/05/20 1600     Color, UA Yellow     Appearance, UA Clear     pH, UA 5.5     Specific Gravity, UA 1.020     Glucose, UA Negative     Ketones, UA Trace     Bilirubin, UA Negative     Blood, UA Moderate (2+)     Protein, UA Negative     Leuk Esterase, UA Negative     Nitrite, UA Negative     Urobilinogen, UA 0.2 E.U./dL    Urinalysis, Microscopic Only - Urine, Catheter [[id number]]  (Abnormal) Collected:  09/05/20 1553    Specimen:  Urine, Catheter Updated:  09/05/20 1600     RBC, UA 21-30 /HPF      WBC, UA 0-2 /HPF      Bacteria, UA None Seen /HPF      Squamous Epithelial Cells, UA None Seen /HPF      Hyaline Casts, UA None Seen /LPF      Methodology Automated Microscopy    Respiratory Culture - Sputum, NT Suction [[id number]] Collected:  09/05/20 1610    Specimen:  Sputum from NT Suction Updated:  09/05/20 1649     Gram Stain Moderate (3+) WBCs per low power field      Rare (1+) Epithelial cells per low power field      Mixed bacterial herlinda    Troponin [[id number]]  (Abnormal) Collected:  09/06/20 0219    Specimen:  Blood Updated:  09/06/20 0337     Troponin T 0.237 ng/mL     Narrative:       Troponin T Reference Range:  <= 0.03 ng/mL-   Negative for AMI  >0.03 ng/mL-     Abnormal for myocardial necrosis.  Clinicians would have to utilize clinical acumen, EKG, Troponin and serial changes to determine if it is an Acute Myocardial Infarction or myocardial injury due to an underlying chronic condition.       Results may be falsely decreased if patient taking Biotin.      Comprehensive Metabolic Panel [[id number]]  (Abnormal) Collected:  09/06/20 0219    Specimen:   Blood Updated:  09/06/20 0355     Glucose 101 mg/dL      BUN 18 mg/dL      Creatinine 0.96 mg/dL      Sodium 140 mmol/L      Potassium 3.6 mmol/L      Chloride 105 mmol/L      CO2 25.0 mmol/L      Calcium 8.1 mg/dL      Total Protein 5.3 g/dL      Albumin 3.30 g/dL      ALT (SGPT) 19 U/L      AST (SGOT) 27 U/L      Alkaline Phosphatase 62 U/L      Total Bilirubin 0.7 mg/dL      eGFR Non African Amer 80 mL/min/1.73      Globulin 2.0 gm/dL      A/G Ratio 1.7 g/dL      BUN/Creatinine Ratio 18.8     Anion Gap 10.0 mmol/L     Narrative:       GFR Normal >60  Chronic Kidney Disease <60  Kidney Failure <15      CBC & Differential [[id number]] Collected:  09/06/20 0219    Specimen:  Blood Updated:  09/06/20 0328    Narrative:       The following orders were created for panel order CBC & Differential.  Procedure                               Abnormality         Status                     ---------                               -----------         ------                     CBC Auto Differential[[id number]]        Abnormal            Final result                 Please view results for these tests on the individual orders.    Magnesium [[id number]]  (Normal) Collected:  09/06/20 0219    Specimen:  Blood Updated:  09/06/20 0341     Magnesium 2.0 mg/dL     Phosphorus [[id number]]  (Normal) Collected:  09/06/20 0219    Specimen:  Blood Updated:  09/06/20 0341     Phosphorus 4.1 mg/dL     CBC Auto Differential [[id number]]  (Abnormal) Collected:  09/06/20 0219    Specimen:  Blood Updated:  09/06/20 0328     WBC 13.76 10*3/mm3      RBC 4.04 10*6/mm3      Hemoglobin 13.6 g/dL      Hematocrit 37.5 %      MCV 92.8 fL      MCH 33.7 pg      MCHC 36.3 g/dL      RDW 12.6 %      RDW-SD 43.3 fl      MPV 12.0 fL      Platelets 188 10*3/mm3      Neutrophil % 86.2 %      Lymphocyte % 6.8 %      Monocyte % 6.5 %      Eosinophil % 0.0 %      Basophil % 0.1 %      Immature Grans % 0.4 %      Neutrophils, Absolute 11.86 10*3/mm3      Lymphocytes,  Absolute 0.93 10*3/mm3      Monocytes, Absolute 0.90 10*3/mm3      Eosinophils, Absolute 0.00 10*3/mm3      Basophils, Absolute 0.02 10*3/mm3      Immature Grans, Absolute 0.05 10*3/mm3      nRBC 0.0 /100 WBC     Blood Gas, Arterial [[id number]]  (Abnormal) Collected:  09/06/20 0410    Specimen:  Arterial Blood Updated:  09/06/20 0418     Site Right Radial     Lenny's Test Positive     pH, Arterial 7.455 pH units      Comment: 83 Value above reference range        pCO2, Arterial 37.5 mm Hg      pO2, Arterial 85.6 mm Hg      HCO3, Arterial 26.3 mmol/L      Comment: 83 Value above reference range        Base Excess, Arterial 2.5 mmol/L      Comment: 83 Value above reference range        O2 Saturation, Arterial 97.5 %      Barometric Pressure for Blood Gas 752 mmHg      Modality Ventilator     FIO2 50 %      Ventilator Mode AC     Set Tidal Volume 600     Set Mech Resp Rate 20.0     PEEP 5.0     PIP 28 cmH2O      Comment: Meter: [id number]     :  [id number]        Collected by NC    Blood Culture - Blood, Hand, Right [[id number]] Collected:  09/06/20 0545    Specimen:  Blood from Hand, Right Updated:  09/06/20 0554                 A/P:  1.Aortic Stenosis  prob severe  2.Troponin elevation -secondary to #1 and hypoxemia.  3.Hypoxemia  By echo he had severe aortic stenosis.  I think the troponin elevation and even possibly the hypoxemia is secondary to this.  He had good diuresis and I think his fluid overload was also once again related to the aortic stenosis.  We will continue mild diuresis.  Obviously this is not something that is new and we will slowly have him wean off the ventilator while continuing his diuresis.  Long-term he will need a left and right heart catheterization.  This is nonemergent.          This document has been electronically signed by Veronica Gonzalez MD on September 6, 2020 08:41           Part of this note may be an electronic transcription/translation of spoken language to printed  text using the Dragon Dictation System.      Electronically signed by Veronica Gonzalez MD at 09/06/20 0964

## 2020-09-08 NOTE — PLAN OF CARE
Problem: Patient Care Overview  Goal: Plan of Care Review  Outcome: Ongoing (interventions implemented as appropriate)  Flowsheets  Taken 9/8/2020 1528  Plan of Care Reviewed With: patient  Taken 9/8/2020 1450  Outcome Summary: PT eval completed on this date. Patient motivated to return home and get back to being independent. Bed mobility: CGA for supine>sit transfer Transfer: CGA for sit<>Stand transfer to improve forward trunk lean and steadiness MinAx1 for bed>chair transfer with HHA. Gait: Patient ambulated 5'x1 with HHA. Balance: Tinetti balance and gait completed 17/28 which indicates high fall risk. Patient would benefit from skilled PT to address deficits in functional mobility. Barriers to d/c fall risk, assistance with transfers, insights into deficits. Recommend Home with assist and HHPT.

## 2020-09-08 NOTE — PLAN OF CARE
Problem: Nutrition, Enteral (Adult)  Goal: Signs and Symptoms of Listed Potential Problems Will be Absent, Minimized or Managed (Nutrition, Enteral)  Outcome: Ongoing (interventions implemented as appropriate)  Flowsheets (Taken 9/8/2020 1224)  Problems Assessed (Enteral Nutrition): all  Problems Present (Enteral Nutrition): other (see comments)  Note:   Pt extubated and tube feeding on hold for swallowing evaluation     Problem: Patient Care Overview  Goal: Plan of Care Review  Outcome: Ongoing (interventions implemented as appropriate)  Flowsheets (Taken 9/8/2020 1224)  Progress: no change  Plan of Care Reviewed With: caregiver; patient  Outcome Summary: Pt NPO.  Extubated and tube feeding on hold for Swallowing eval.

## 2020-09-09 LAB
ALBUMIN SERPL-MCNC: 3.7 G/DL (ref 3.5–5.2)
ALBUMIN/GLOB SERPL: 1.6 G/DL
ALP SERPL-CCNC: 70 U/L (ref 39–117)
ALT SERPL W P-5'-P-CCNC: 33 U/L (ref 1–41)
ANION GAP SERPL CALCULATED.3IONS-SCNC: 10 MMOL/L (ref 5–15)
AST SERPL-CCNC: 32 U/L (ref 1–40)
BASOPHILS # BLD AUTO: 0.04 10*3/MM3 (ref 0–0.2)
BASOPHILS NFR BLD AUTO: 0.5 % (ref 0–1.5)
BILIRUB SERPL-MCNC: 1 MG/DL (ref 0–1.2)
BUN SERPL-MCNC: 21 MG/DL (ref 6–20)
BUN/CREAT SERPL: 29.2 (ref 7–25)
CALCIUM SPEC-SCNC: 8.7 MG/DL (ref 8.6–10.5)
CHLORIDE SERPL-SCNC: 103 MMOL/L (ref 98–107)
CO2 SERPL-SCNC: 26 MMOL/L (ref 22–29)
CREAT SERPL-MCNC: 0.72 MG/DL (ref 0.76–1.27)
DEPRECATED RDW RBC AUTO: 42.2 FL (ref 37–54)
EOSINOPHIL # BLD AUTO: 0.36 10*3/MM3 (ref 0–0.4)
EOSINOPHIL NFR BLD AUTO: 4.1 % (ref 0.3–6.2)
ERYTHROCYTE [DISTWIDTH] IN BLOOD BY AUTOMATED COUNT: 12.1 % (ref 12.3–15.4)
GFR SERPL CREATININE-BSD FRML MDRD: 112 ML/MIN/1.73
GLOBULIN UR ELPH-MCNC: 2.3 GM/DL
GLUCOSE SERPL-MCNC: 106 MG/DL (ref 65–99)
HCT VFR BLD AUTO: 43.6 % (ref 37.5–51)
HGB BLD-MCNC: 15.1 G/DL (ref 13–17.7)
IMM GRANULOCYTES # BLD AUTO: 0.02 10*3/MM3 (ref 0–0.05)
IMM GRANULOCYTES NFR BLD AUTO: 0.2 % (ref 0–0.5)
LYMPHOCYTES # BLD AUTO: 1.42 10*3/MM3 (ref 0.7–3.1)
LYMPHOCYTES NFR BLD AUTO: 16.1 % (ref 19.6–45.3)
MAGNESIUM SERPL-MCNC: 2.1 MG/DL (ref 1.6–2.6)
MCH RBC QN AUTO: 32.8 PG (ref 26.6–33)
MCHC RBC AUTO-ENTMCNC: 34.6 G/DL (ref 31.5–35.7)
MCV RBC AUTO: 94.6 FL (ref 79–97)
MONOCYTES # BLD AUTO: 0.86 10*3/MM3 (ref 0.1–0.9)
MONOCYTES NFR BLD AUTO: 9.8 % (ref 5–12)
NEUTROPHILS NFR BLD AUTO: 6.11 10*3/MM3 (ref 1.7–7)
NEUTROPHILS NFR BLD AUTO: 69.3 % (ref 42.7–76)
NRBC BLD AUTO-RTO: 0 /100 WBC (ref 0–0.2)
PHOSPHATE SERPL-MCNC: 3.7 MG/DL (ref 2.5–4.5)
PLATELET # BLD AUTO: 182 10*3/MM3 (ref 140–450)
PMV BLD AUTO: 12.2 FL (ref 6–12)
POTASSIUM SERPL-SCNC: 3.6 MMOL/L (ref 3.5–5.2)
PROT SERPL-MCNC: 6 G/DL (ref 6–8.5)
RBC # BLD AUTO: 4.61 10*6/MM3 (ref 4.14–5.8)
SODIUM SERPL-SCNC: 139 MMOL/L (ref 136–145)
WBC # BLD AUTO: 8.81 10*3/MM3 (ref 3.4–10.8)

## 2020-09-09 PROCEDURE — 97110 THERAPEUTIC EXERCISES: CPT

## 2020-09-09 PROCEDURE — 83735 ASSAY OF MAGNESIUM: CPT | Performed by: HOSPITALIST

## 2020-09-09 PROCEDURE — 97116 GAIT TRAINING THERAPY: CPT

## 2020-09-09 PROCEDURE — 25010000002 FUROSEMIDE PER 20 MG: Performed by: INTERNAL MEDICINE

## 2020-09-09 PROCEDURE — 94640 AIRWAY INHALATION TREATMENT: CPT

## 2020-09-09 PROCEDURE — 84100 ASSAY OF PHOSPHORUS: CPT | Performed by: HOSPITALIST

## 2020-09-09 PROCEDURE — 94799 UNLISTED PULMONARY SVC/PX: CPT

## 2020-09-09 PROCEDURE — 25010000002 CEFTRIAXONE PER 250 MG: Performed by: INTERNAL MEDICINE

## 2020-09-09 PROCEDURE — 25010000002 AZITHROMYCIN PER 500 MG: Performed by: INTERNAL MEDICINE

## 2020-09-09 PROCEDURE — 99233 SBSQ HOSP IP/OBS HIGH 50: CPT | Performed by: INTERNAL MEDICINE

## 2020-09-09 PROCEDURE — 80053 COMPREHEN METABOLIC PANEL: CPT | Performed by: HOSPITALIST

## 2020-09-09 PROCEDURE — 25010000002 ENOXAPARIN PER 10 MG: Performed by: INTERNAL MEDICINE

## 2020-09-09 PROCEDURE — 25010000002 THIAMINE PER 100 MG: Performed by: INTERNAL MEDICINE

## 2020-09-09 PROCEDURE — 97166 OT EVAL MOD COMPLEX 45 MIN: CPT

## 2020-09-09 PROCEDURE — 85025 COMPLETE CBC W/AUTO DIFF WBC: CPT | Performed by: HOSPITALIST

## 2020-09-09 PROCEDURE — 92526 ORAL FUNCTION THERAPY: CPT | Performed by: SPEECH-LANGUAGE PATHOLOGIST

## 2020-09-09 RX ORDER — POTASSIUM CHLORIDE 1.5 G/1.77G
40 POWDER, FOR SOLUTION ORAL ONCE
Status: COMPLETED | OUTPATIENT
Start: 2020-09-09 | End: 2020-09-09

## 2020-09-09 RX ADMIN — IPRATROPIUM BROMIDE AND ALBUTEROL SULFATE 3 ML: 2.5; .5 SOLUTION RESPIRATORY (INHALATION) at 19:39

## 2020-09-09 RX ADMIN — POTASSIUM CHLORIDE 40 MEQ: 1.5 POWDER, FOR SOLUTION ORAL at 13:23

## 2020-09-09 RX ADMIN — FAMOTIDINE 20 MG: 10 INJECTION INTRAVENOUS at 20:07

## 2020-09-09 RX ADMIN — FUROSEMIDE 40 MG: 10 INJECTION, SOLUTION INTRAMUSCULAR; INTRAVENOUS at 08:17

## 2020-09-09 RX ADMIN — AZITHROMYCIN MONOHYDRATE 500 MG: 500 INJECTION, POWDER, LYOPHILIZED, FOR SOLUTION INTRAVENOUS at 05:30

## 2020-09-09 RX ADMIN — THIAMINE HYDROCHLORIDE 100 MG: 100 INJECTION, SOLUTION INTRAMUSCULAR; INTRAVENOUS at 08:19

## 2020-09-09 RX ADMIN — ENOXAPARIN SODIUM 40 MG: 40 INJECTION SUBCUTANEOUS at 08:18

## 2020-09-09 RX ADMIN — SODIUM CHLORIDE, PRESERVATIVE FREE 10 ML: 5 INJECTION INTRAVENOUS at 08:22

## 2020-09-09 RX ADMIN — BUDESONIDE AND FORMOTEROL FUMARATE DIHYDRATE 2 PUFF: 160; 4.5 AEROSOL RESPIRATORY (INHALATION) at 19:39

## 2020-09-09 RX ADMIN — SODIUM CHLORIDE, PRESERVATIVE FREE 10 ML: 5 INJECTION INTRAVENOUS at 20:13

## 2020-09-09 RX ADMIN — IPRATROPIUM BROMIDE AND ALBUTEROL SULFATE 3 ML: 2.5; .5 SOLUTION RESPIRATORY (INHALATION) at 14:02

## 2020-09-09 RX ADMIN — BUDESONIDE AND FORMOTEROL FUMARATE DIHYDRATE 2 PUFF: 160; 4.5 AEROSOL RESPIRATORY (INHALATION) at 07:46

## 2020-09-09 RX ADMIN — IPRATROPIUM BROMIDE AND ALBUTEROL SULFATE 3 ML: 2.5; .5 SOLUTION RESPIRATORY (INHALATION) at 07:44

## 2020-09-09 RX ADMIN — Medication 2.5 MG: at 08:17

## 2020-09-09 RX ADMIN — FAMOTIDINE 20 MG: 10 INJECTION INTRAVENOUS at 08:18

## 2020-09-09 RX ADMIN — FUROSEMIDE 40 MG: 10 INJECTION, SOLUTION INTRAMUSCULAR; INTRAVENOUS at 20:06

## 2020-09-09 RX ADMIN — CEFTRIAXONE SODIUM 1 G: 1 INJECTION, POWDER, FOR SOLUTION INTRAMUSCULAR; INTRAVENOUS at 13:23

## 2020-09-09 NOTE — PROGRESS NOTES
[facility]  INPATIENT PROGRESS NOTE    Name: Mark Sharma  Age/Sex: 59 y.o. male  :  1960        PCP: Tyler Singh MD    Vital Signs  Temp:  [98 °F (36.7 °C)-98.4 °F (36.9 °C)] 98.1 °F (36.7 °C)  Heart Rate:  [] 77  Resp:  [18-25] 18  BP: (111-136)/(72-89) 118/74  FiO2 (%):  [35 %] 35 %  Body mass index is 25.77 kg/m².     Subjective   Awake and alert.  Spoke to him about echo findings of AS-sever.  He understands.  Patient Active Problem List   Diagnosis   • Acute respiratory failure with hypoxia (CMS/HCC)   • Aortic stenosis, severe   • Acute systolic CHF (congestive heart failure) (CMS/HCC)       Past Medical History:   Diagnosis Date   • Hypertension        Current Facility-Administered Medications   Medication Dose Route Frequency Provider Last Rate Last Dose   • bisoprolol (ZEBeta) half tablet 2.5 mg  2.5 mg Oral Q24H Veronica Gonzalez MD   2.5 mg at 20 1023   • budesonide-formoterol (SYMBICORT) 160-4.5 MCG/ACT inhaler 2 puff  2 puff Inhalation BID - RT Iker Parker MD       • cefTRIAXone (ROCEPHIN) 1 g/100 mL 0.9% NS (MBP)  1 g Intravenous Q24H Iker Parker MD   1 g at 20 1243   • chlorhexidine (PERIDEX) 0.12 % solution 15 mL  15 mL Mouth/Throat Q12H Madi Griffith MD   15 mL at 20   • enoxaparin (LOVENOX) syringe 40 mg  40 mg Subcutaneous Q24H Urbano Osborn MD   40 mg at 20 0810   • famotidine (PEPCID) injection 20 mg  20 mg Intravenous Q12H Madi Griffith MD   20 mg at 20   • fentaNYL citrate (PF) (SUBLIMAZE) injection 25 mcg  25 mcg Intravenous Q2H PRN Iker Parker MD       • furosemide (LASIX) injection 40 mg  40 mg Intravenous Q12H Veronica Gonzalez MD   40 mg at 20   • ipratropium-albuterol (DUO-NEB) nebulizer solution 3 mL  3 mL Nebulization 4x Daily - RT Iker Parker MD   3 mL at 20 1415   • lactated  ringers infusion  30 mL/hr Intravenous Continuous Marija SneedFRANCISCA 30 mL/hr at 09/08/20 2315 30 mL/hr at 09/08/20 2315   • LORazepam (ATIVAN) tablet 1 mg  1 mg Oral Q2H PRN Iker Parker MD        Or   • LORazepam (ATIVAN) injection 1 mg  1 mg Intravenous Q2H PRN Iker Parker MD        Or   • LORazepam (ATIVAN) tablet 2 mg  2 mg Oral Q1H PRN Iker Parker MD        Or   • LORazepam (ATIVAN) injection 2 mg  2 mg Intravenous Q1H PRN Iker Parker MD        Or   • LORazepam (ATIVAN) injection 2 mg  2 mg Intravenous Q15 Min PRN Iker Parker MD        Or   • LORazepam (ATIVAN) injection 2 mg  2 mg Intramuscular Q15 Min PRN Iker Parker MD        Or   • LORazepam (ATIVAN) tablet 4 mg  4 mg Oral Q1H PRN Iker Parker MD        Or   • LORazepam (ATIVAN) injection 4 mg  4 mg Intravenous Q1H PRN Iker Parker MD       • nicotine (NICODERM CQ) 14 MG/24HR patch 1 patch  1 patch Transdermal Q24H Iker Parker MD   1 patch at 09/08/20 0811   • sodium chloride 0.9 % flush 10 mL  10 mL Intravenous PRN Vic Rodriguez MD       • sodium chloride 0.9 % flush 10 mL  10 mL Intravenous Q12H Urbano Osborn MD   10 mL at 09/08/20 2013   • sodium chloride 0.9 % flush 10 mL  10 mL Intravenous PRN Urbano Osborn MD       • thiamine (B-1) 100 mg in sodium chloride 0.9 % 100 mL IVPB  100 mg Intravenous Daily Iker Parker  mL/hr at 09/08/20 0810 100 mg at 09/08/20 0810       Past Surgical History:   Procedure Laterality Date   • APPENDECTOMY     • COLONOSCOPY N/A 12/20/2019    Procedure: COLONOSCOPY;  Surgeon: Dion Ambrocio DO;  Location: [facility] ENDOSCOPY;  Service: Gastroenterology       Social History     Socioeconomic History   • Marital status: Single     Spouse name: Not on file   • Number of children: Not on file   • Years of education: Not on file   • Highest education level: Not on file   Tobacco Use   • Smoking status: Former Smoker      Years: 2.00     Types: Cigars   • Smokeless tobacco: Never Used   Substance and Sexual Activity   • Alcohol use: Yes     Alcohol/week: 2.0 standard drinks     Types: 2 Cans of beer per week     Comment: 2 beer/wk maybe 6-8 on wknd   • Drug use: Not Currently   • Sexual activity: Defer       O/E    Neck: Supple.  No JVD, no thyroid enlargement.  Chest: Air entry equal, normal respiration.  No rhonchi or creps.  Cardiovascular system:  Regular rate and rhythm, no murmurs.  Abdomen: Soft, no tenderness, bowel sounds present, no hepatosplenomegaly.  CNS: Alert, oriented to place and time.  No motor or sensory deficit.  Cranial nerves intact.  Musculoskeletal: No deformity of the back or spine.  Extremities:  No edema.  Pulses equal on both sides.    Lab Results (last 24 hours)     Procedure Component Value Units Date/Time    Blood Gas, Arterial [[id number]]  (Abnormal) Collected:  09/08/20 1030    Specimen:  Arterial Blood Updated:  09/08/20 1055     Site Right Radial     Lenny's Test N/A     pH, Arterial 7.473 pH units      Comment: 83 Value above reference range        pCO2, Arterial 36.4 mm Hg      pO2, Arterial 75.1 mm Hg      Comment: 84 Value below reference range        HCO3, Arterial 26.6 mmol/L      Comment: 83 Value above reference range        Base Excess, Arterial 3.1 mmol/L      Comment: 83 Value above reference range        O2 Saturation, Arterial 96.0 %      Barometric Pressure for Blood Gas 748 mmHg      Modality Room Air     Flow Rate 2.0 lpm      Ventilator Mode NA     Collected by RT     Comment: Meter: [id number]     :  [id number]       CBC & Differential [[id number]] Collected:  09/09/20 0531    Specimen:  Blood Updated:  09/09/20 0707    Narrative:       The following orders were created for panel order CBC & Differential.  Procedure                               Abnormality         Status                     ---------                               -----------         ------                      CBC Auto Differential[[id number]]        Abnormal            Final result                 Please view results for these tests on the individual orders.    Comprehensive Metabolic Panel [[id number]]  (Abnormal) Collected:  09/09/20 0531    Specimen:  Blood Updated:  09/09/20 0722     Glucose 106 mg/dL      BUN 21 mg/dL      Creatinine 0.72 mg/dL      Sodium 139 mmol/L      Potassium 3.6 mmol/L      Chloride 103 mmol/L      CO2 26.0 mmol/L      Calcium 8.7 mg/dL      Total Protein 6.0 g/dL      Albumin 3.70 g/dL      ALT (SGPT) 33 U/L      AST (SGOT) 32 U/L      Alkaline Phosphatase 70 U/L      Total Bilirubin 1.0 mg/dL      eGFR Non African Amer 112 mL/min/1.73      Globulin 2.3 gm/dL      A/G Ratio 1.6 g/dL      BUN/Creatinine Ratio 29.2     Anion Gap 10.0 mmol/L     Narrative:       GFR Normal >60  Chronic Kidney Disease <60  Kidney Failure <15      Magnesium [[id number]]  (Normal) Collected:  09/09/20 0531    Specimen:  Blood Updated:  09/09/20 0722     Magnesium 2.1 mg/dL     Phosphorus [[id number]]  (Normal) Collected:  09/09/20 0531    Specimen:  Blood Updated:  09/09/20 0722     Phosphorus 3.7 mg/dL     CBC Auto Differential [[id number]]  (Abnormal) Collected:  09/09/20 0531    Specimen:  Blood Updated:  09/09/20 0707     WBC 8.81 10*3/mm3      RBC 4.61 10*6/mm3      Hemoglobin 15.1 g/dL      Hematocrit 43.6 %      MCV 94.6 fL      MCH 32.8 pg      MCHC 34.6 g/dL      RDW 12.1 %      RDW-SD 42.2 fl      MPV 12.2 fL      Platelets 182 10*3/mm3      Neutrophil % 69.3 %      Lymphocyte % 16.1 %      Monocyte % 9.8 %      Eosinophil % 4.1 %      Basophil % 0.5 %      Immature Grans % 0.2 %      Neutrophils, Absolute 6.11 10*3/mm3      Lymphocytes, Absolute 1.42 10*3/mm3      Monocytes, Absolute 0.86 10*3/mm3      Eosinophils, Absolute 0.36 10*3/mm3      Basophils, Absolute 0.04 10*3/mm3      Immature Grans, Absolute 0.02 10*3/mm3      nRBC 0.0 /100 WBC           A/P  AS  Impoving.good diuresis.  Explained to pt  about AS-naturally history. Options for RX.  He seems to understand.  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        This document has been electronically signed by Veronica Gonzalez MD on September 9, 2020 07:31         Part of this note may be an electronic transcription/translation of spoken language to printed text using the Dragon Dictation System.

## 2020-09-09 NOTE — THERAPY DISCHARGE NOTE
Acute Care - Speech Language Pathology   Swallow Treatment Note/Discharge   [facility]     Patient Name: Mark Sharma  : 1960  MRN: [id number]  Today's Date: 2020  Onset of Illness/Injury or Date of Surgery: 20     Referring Physician: CINDY Parker MD       Admit Date: 2020  Pt dentures are not present.  He request remain on [facility] soft diet at this time.  He may request diet upgrade if/when dentures arrive.  At this time, pt is a good historian and is not demonstrating any dysphagia beyond edentulous.  Recommend d/c skilled ST and advance diet if requested by pt when teeth arrive.    Shira Teixeira CCC-SLP 2020 12:32    Visit Dx:      ICD-10-CM ICD-9-CM   1. Acute respiratory failure with hypoxia (CMS/[facility]) J96.01 518.81   2. Pneumonia due to organism J18.9 486   3. COPD exacerbation (CMS/[facility]) J44.1 491.21   4. Dysphagia, unspecified type R13.10 787.20   5. Impaired functional mobility, balance, gait, and endurance Z74.09 V49.89   6. Impaired mobility and ADLs Z74.09 V49.89    Z78.9      Patient Active Problem List   Diagnosis   • Acute respiratory failure with hypoxia (CMS/[facility])   • Aortic stenosis, severe   • Acute systolic CHF (congestive heart failure) (CMS/[facility])       Therapy Treatment  Rehabilitation Treatment Summary     Row Name 20 1215             Treatment Time/Intention    Discipline  speech language pathologist  -EC      Document Type  discharge treatment  -EC      Subjective Information  no complaints  -EC      Mode of Treatment  individual therapy  -EC      Patient/Family Observations  no family present  -EC      Therapy Frequency (Swallow)  1 day per week  -EC      Patient Effort  excellent  -EC      Recorded by [EC] Shira Teixeira CCC-SLP 20 1227      Row Name 20 1215             Positioning and Restraints    Pre-Treatment Position  sitting in chair/recliner  -EC      Post Treatment Position  chair  -EC      In Chair  sitting;call light  within reach;encouraged to call for assist;exit alarm on  -EC      Recorded by [EC] Shira Teixeira CCC-SLP 09/09/20 1227      Row Name 09/09/20 1215             Pain Scale: Numbers Pre/Post-Treatment    Pain Scale: Numbers, Pretreatment  0/10 - no pain  -EC      Pain Scale: Numbers, Post-Treatment  0/10 - no pain  -EC      Recorded by [EC] Shira Teixeira CCC-SLP 09/09/20 1227        User Key  (r) = Recorded By, (t) = Taken By, (c) = Cosigned By    Initials Name Effective Dates Discipline     Shira Teixeira CCC-SLP 02/11/20 -  SLP        Outcome Summary     SLP GOALS     Row Name 09/09/20 1215 09/08/20 1336          Oral Nutrition/Hydration Goal 1 (SLP)    Oral Nutrition/Hydration Goal 1, SLP  Pt to tolerate least restrictive diet with no s/s of aspiraiton for adequate nutrition and hydration.   -EC  Pt to tolerate least restrictive diet with no s/s of aspiraiton for adequate nutrition and hydration.   -EK     Time Frame (Oral Nutrition/Hydration Goal 1, SLP)  by discharge  -EC  by discharge  -EK     Barriers (Oral Nutrition/Hydration Goal 1, SLP)  edentuous  -EC  --     Progress/Outcomes (Oral Nutrition/Hydration Goal 1, SLP)  goal partially met  -EC  other (see comments) new goal   -EK       User Key  (r) = Recorded By, (t) = Taken By, (c) = Cosigned By    Initials Name Provider Type    Shira Beard CCC-SLP Speech and Language Pathologist    Shira Gould CCC-SLP Speech and Language Pathologist          EDUCATION  The patient has been educated in the following areas:   Dysphagia (Swallowing Impairment) Modified Diet Instruction.    SLP Recommendation and Plan                                Time Calculation:   Time Calculation- SLP     Row Name 09/09/20 1230             Time Calculation- SLP    SLP Start Time  1215  -EC      SLP Stop Time  1240  -EC      SLP Time Calculation (min)  25 min  -EC      Total Timed Code Minutes- SLP  25 minute(s)  -EC      SLP Received On   09/09/20  -        User Key  (r) = Recorded By, (t) = Taken By, (c) = Cosigned By    Initials Name Provider Type    Shira Beard CCC-SLP Speech and Language Pathologist          Therapy Charges for Today     Code Description Service Date Service Provider Modifiers Qty    [id number]  ST TREATMENT SWALLOW 2 9/9/2020 Shira Teixeira CCC-SLP GN 1                    ARTI Armendariz  9/9/2020

## 2020-09-09 NOTE — PROGRESS NOTES
[facility] Medicine Services  INPATIENT PROGRESS NOTE    Length of Stay: 4  Date of Admission: 9/5/2020  Primary Care Physician: Tyler Singh MD    Subjective   Chief Complaint: Respiratory failure.    HPI: Patient is seen for follow-up. No new complaints. Plan for cardiac cath tomorrow.     Review of Systems   Constitutional: Positive for activity change, appetite change and fatigue. Negative for chills, diaphoresis and fever.   HENT: Negative for trouble swallowing and voice change.    Eyes: Negative for photophobia and visual disturbance.   Respiratory: Negative for cough, choking, chest tightness, shortness of breath, wheezing and stridor.    Cardiovascular: Negative for chest pain, palpitations and leg swelling.   Gastrointestinal: Negative for abdominal distention, abdominal pain, blood in stool, constipation, diarrhea, nausea and vomiting.   Endocrine: Negative for cold intolerance, heat intolerance, polydipsia, polyphagia and polyuria.   Genitourinary: Negative for decreased urine volume, difficulty urinating, dysuria, enuresis, flank pain, frequency, hematuria and urgency.   Musculoskeletal: Negative for arthralgias, gait problem, myalgias, neck pain and neck stiffness.   Skin: Negative for pallor, rash and wound.   Neurological: Negative for dizziness, tremors, seizures, syncope, facial asymmetry, speech difficulty, weakness, light-headedness, numbness and headaches.   Hematological: Does not bruise/bleed easily.   Psychiatric/Behavioral: Negative for agitation, behavioral problems and confusion.       Objective    Temp:  [98 °F (36.7 °C)-98.4 °F (36.9 °C)] 98 °F (36.7 °C)  Heart Rate:  [] 90  Resp:  [15-18] 18  BP: (102-131)/(70-90) 118/81      Physical Exam   Constitutional: He is oriented to person, place, and time. He appears well-developed and well-nourished. No distress.   HENT:   Head: Normocephalic and atraumatic.   Eyes: Pupils are equal,  round, and reactive to light. EOM are normal. No scleral icterus.   Neck: Normal range of motion. Neck supple. No JVD present. No thyromegaly present.   Cardiovascular: Normal rate, regular rhythm and normal heart sounds. Exam reveals no gallop and no friction rub.   No murmur heard.  Pulmonary/Chest: Effort normal. He has no decreased breath sounds. He has no wheezes. He has rhonchi. He has no rales. He exhibits no tenderness.   Abdominal: Soft. Bowel sounds are normal. He exhibits no distension and no mass. There is no tenderness. There is no rebound and no guarding.   Musculoskeletal: He exhibits no edema, tenderness or deformity.   Neurological: He is alert and oriented to person, place, and time. No cranial nerve deficit or sensory deficit. He exhibits normal muscle tone. Coordination normal.   Skin: Skin is warm and dry. No rash noted. He is not diaphoretic. No erythema. No pallor.   Psychiatric: He has a normal mood and affect. His behavior is normal. Judgment and thought content normal.   Nursing note and vitals reviewed.        Medication Review:    Current Facility-Administered Medications:   •  bisoprolol (ZEBeta) half tablet 2.5 mg, 2.5 mg, Oral, Q24H, Veronica Gonzalez MD, 2.5 mg at 09/09/20 0817  •  budesonide-formoterol (SYMBICORT) 160-4.5 MCG/ACT inhaler 2 puff, 2 puff, Inhalation, BID - RT, Iker Parker MD, 2 puff at 09/09/20 0746  •  cefTRIAXone (ROCEPHIN) 1 g/100 mL 0.9% NS (MBP), 1 g, Intravenous, Q24H, Iker Parker MD, 1 g at 09/09/20 1323  •  chlorhexidine (PERIDEX) 0.12 % solution 15 mL, 15 mL, Mouth/Throat, Q12H, Madi Griffith MD, 15 mL at 09/08/20 2012  •  enoxaparin (LOVENOX) syringe 40 mg, 40 mg, Subcutaneous, Q24H, Urbano Osborn MD, 40 mg at 09/09/20 0818  •  famotidine (PEPCID) injection 20 mg, 20 mg, Intravenous, Q12H, Madi Griffith MD, 20 mg at 09/09/20 0818  •  fentaNYL citrate (PF) (SUBLIMAZE) injection 25 mcg,  25 mcg, Intravenous, Q2H PRN, Iker Parker MD  •  furosemide (LASIX) injection 40 mg, 40 mg, Intravenous, Q12H, Veronica Gonzalez MD, 40 mg at 09/09/20 0817  •  ipratropium-albuterol (DUO-NEB) nebulizer solution 3 mL, 3 mL, Nebulization, 4x Daily - RT, Iker Parker MD, 3 mL at 09/09/20 1402  •  lactated ringers infusion, 30 mL/hr, Intravenous, Continuous, Marija Sneed, FRANCISCA, Last Rate: 30 mL/hr at 09/08/20 2315, 30 mL/hr at 09/08/20 2315  •  LORazepam (ATIVAN) tablet 1 mg, 1 mg, Oral, Q2H PRN **OR** LORazepam (ATIVAN) injection 1 mg, 1 mg, Intravenous, Q2H PRN **OR** LORazepam (ATIVAN) tablet 2 mg, 2 mg, Oral, Q1H PRN **OR** LORazepam (ATIVAN) injection 2 mg, 2 mg, Intravenous, Q1H PRN **OR** LORazepam (ATIVAN) injection 2 mg, 2 mg, Intravenous, Q15 Min PRN **OR** LORazepam (ATIVAN) injection 2 mg, 2 mg, Intramuscular, Q15 Min PRN **OR** LORazepam (ATIVAN) tablet 4 mg, 4 mg, Oral, Q1H PRN **OR** LORazepam (ATIVAN) injection 4 mg, 4 mg, Intravenous, Q1H PRN, Iker Parker MD  •  nicotine (NICODERM CQ) 14 MG/24HR patch 1 patch, 1 patch, Transdermal, Q24H, Iker Parker MD, 1 patch at 09/08/20 0811  •  sodium chloride 0.9 % flush 10 mL, 10 mL, Intravenous, PRN, Vic Rodriguez MD  •  sodium chloride 0.9 % flush 10 mL, 10 mL, Intravenous, Q12H, Urbano Osborn MD, 10 mL at 09/09/20 0822  •  sodium chloride 0.9 % flush 10 mL, 10 mL, Intravenous, PRN, Urbano Osborn MD  •  thiamine (B-1) 100 mg in sodium chloride 0.9 % 100 mL IVPB, 100 mg, Intravenous, Daily, ManueluIker MD, Last Rate: 200 mL/hr at 09/09/20 0819, 100 mg at 09/09/20 0819    Results Review:  I have reviewed the labs, radiology results, and diagnostic studies.    Laboratory Data:   Results from last 7 days   Lab Units 09/09/20  0531 09/08/20  0504 09/07/20  0322   SODIUM mmol/L 139 140 139   POTASSIUM mmol/L 3.6 3.8 3.7   CHLORIDE mmol/L 103 106 106   CO2 mmol/L 26.0 27.0 26.0   BUN mg/dL 21* 17 16      CREATININE mg/dL 0.72* 0.83 0.88   GLUCOSE mg/dL 106* 119* 97   CALCIUM mg/dL 8.7 8.3* 8.0*   BILIRUBIN mg/dL 1.0 1.0 0.8   ALK PHOS U/L 70 67 60   ALT (SGPT) U/L 33 21 17   AST (SGOT) U/L 32 24 23   ANION GAP mmol/L 10.0 7.0 7.0     Estimated Creatinine Clearance: 134.7 mL/min (A) (by C-G formula based on SCr of 0.72 mg/dL (L)).  Results from last 7 days   Lab Units 09/09/20  0531 09/08/20  0504 09/07/20  0322   MAGNESIUM mg/dL 2.1 2.1 2.0   PHOSPHORUS mg/dL 3.7 4.1 2.9         Results from last 7 days   Lab Units 09/09/20  0531 09/08/20  0504 09/07/20  0322 09/06/20  0219 09/05/20  0438   WBC 10*3/mm3 8.81 9.22 9.98 13.76* 12.29*   HEMOGLOBIN g/dL 15.1 14.2 13.2 13.6 16.0   HEMATOCRIT % 43.6 41.0 38.2 37.5 45.5   PLATELETS 10*3/mm3 182 174 158 188 256           Culture Data:   No results found for: BLOODCX  No results found for: URINECX  No results found for: RESPCX  No results found for: WOUNDCX  No results found for: STOOLCX  No components found for: BODYFLD    Radiology Data:   Imaging Results (Last 24 Hours)     ** No results found for the last 24 hours. **          ABG:  Results from last 7 days   Lab Units 09/08/20  1030   PH, ARTERIAL pH units 7.473*   PO2 ART mm Hg 75.1*   PCO2, ARTERIAL mm Hg 36.4   HCO3 ART mmol/L 26.6*       I have reviewed the patient's current medications.     Assessment/Plan     Hospital Problem List:  Principal Problem:    Acute respiratory failure with hypoxia (CMS/HCC)  Active Problems:    Aortic stenosis, severe    Acute systolic CHF (congestive heart failure) (CMS/HCC)    Acute respiratory failure with hypoxia (secondary to bilateral infiltrate/acute systolic heart failure): Patient is status post extubation.  Continue noninvasive respiratory therapy, antimicrobial therapy and bronchodilators.    Acute systolic congestive heart failure (likely due to severe aortic stenosis): Continue diuretics with strict I's and O's, daily weights, salt and fluid restriction.  Patient will  require right heart and left heart catheterization- tentative plan for tomorrow.  Elevated troponin has been attributed to acute illness.  Echocardiogram done 9/5/2020 showed:  · Left ventricular wall thickness is consistent with mild concentric hypertrophy.  · There is severe calcification of the aortic valve.  · Mild aortic valve regurgitation is present.  · Severe aortic valve stenosis is present.  · EF appears decreased at approx 36-40%.     Sepsis secondary to bilateral pneumonia: Patient did screen positive for sepsis.  Continue IV antibiotics.  Blood culture showed coag negative Staphylococcus aureus likely due to contaminant and respiratory culture showed mixed bacterial herlinda.  Reactive leukocytosis has resolved.    Nicotine dependence: Continue nicotine patch.    History of alcoholism: Patient states he quit drinking alcohol more than a year ago.    Nutrition: Perform bedside swallowing assessment and begin oral intake if successful.    Continue GI and DVT prophylaxis.    Discharge Planning: In progress.    Shira Dunn MD   09/09/20   16:59

## 2020-09-09 NOTE — PLAN OF CARE
Problem: Patient Care Overview  Goal: Plan of Care Review  Outcome: Ongoing (interventions implemented as appropriate)  Flowsheets (Taken 9/9/2020 1011)  Plan of Care Reviewed With: patient  Outcome Summary: initial OT evaluation complete. pt very pleasant and cooperative throughout, requesting to get up and use the restroom as soon as OT arrived. BUE strength and AROM WFL grossly. sup to sit completed with HOB elevated, bed rails, and CGA. sit to stand and stand to sit transfers completed with no AD and CGA. ambulated to restroom with no AD and CGA. no LOB noted, but pt with decreased balance and steadiness. once in restroom able to transfer to commode with CGA. CGA for clothing management. supervision for hygiene following voiding, and completed in sitting. transferred to chair at bedside with CGA. set up and min A for donning/doffing of hospital gown. recommend further skilled OT Services to address functional mobility and ADL deficits, as well as endurance and balance deficits. recommend home with assistance as needed at discharge. goals established.

## 2020-09-09 NOTE — PLAN OF CARE
Problem: Patient Care Overview  Goal: Plan of Care Review  Outcome: Ongoing (interventions implemented as appropriate)  Flowsheets  Taken 9/9/2020 1829 by Ana Luisa Moss, RN  Progress: improving  Outcome Summary: possible heart cath tomorrow per Dr. Gonzalez; patient on room air, alert and oriented; Dr. Gonzalez wanted to keep liriano until tomorrow; will continue to monitor  Taken 9/9/2020 1228 by Shira Teixeira CCC-SLP  Plan of Care Reviewed With: patient  Goal: Individualization and Mutuality  Outcome: Ongoing (interventions implemented as appropriate)  Goal: Discharge Needs Assessment  Outcome: Ongoing (interventions implemented as appropriate)  Goal: Interprofessional Rounds/Family Conf  Outcome: Ongoing (interventions implemented as appropriate)     Problem: Skin Injury Risk (Adult)  Goal: Identify Related Risk Factors and Signs and Symptoms  Outcome: Ongoing (interventions implemented as appropriate)  Goal: Skin Health and Integrity  Outcome: Ongoing (interventions implemented as appropriate)     Problem: Fall Risk (Adult)  Goal: Identify Related Risk Factors and Signs and Symptoms  Outcome: Ongoing (interventions implemented as appropriate)  Goal: Absence of Fall  Outcome: Ongoing (interventions implemented as appropriate)     Problem: Cardiac: Heart Failure (Adult)  Goal: Signs and Symptoms of Listed Potential Problems Will be Absent, Minimized or Managed (Cardiac: Heart Failure)  Outcome: Ongoing (interventions implemented as appropriate)     Problem: Sepsis/Septic Shock (Adult)  Goal: Signs and Symptoms of Listed Potential Problems Will be Absent, Minimized or Managed (Sepsis/Septic Shock)  Outcome: Ongoing (interventions implemented as appropriate)

## 2020-09-09 NOTE — SIGNIFICANT NOTE
09/09/20 1503   Rehab Treatment   Discipline physical therapy assistant   Reason Treatment Not Performed unavailable for treatment  (pt getting a breathing Tx @ present time)

## 2020-09-09 NOTE — PLAN OF CARE
Problem: Patient Care Overview  Goal: Plan of Care Review  Outcome: Ongoing (interventions implemented as appropriate)  Flowsheets (Taken 9/9/2020 1501)  Outcome Summary: pt sup>sit with SBA, pt  sit<>stand with CGA, pt ambulated 100` x 2 with min assist. pt would benefit from continued PT services

## 2020-09-09 NOTE — PLAN OF CARE
Problem: Patient Care Overview  Goal: Plan of Care Review  Outcome: Ongoing (interventions implemented as appropriate)  Flowsheets (Taken 9/9/2020 1228)  Progress: improving  Plan of Care Reviewed With: patient  Outcome Summary: dysphagia therapy: pt dentures have not gotten to hospital.  he tolerates current diet with no s/s of aspiration.  he states he was able to eat am meal and is seen during noon meal.  recommend continue current diet.  pt to d/c skilled ST.  pt may wish to have diet advanced when dentures arrive.

## 2020-09-09 NOTE — PLAN OF CARE
Problem: Patient Care Overview  Goal: Plan of Care Review  Outcome: Ongoing (interventions implemented as appropriate)  Flowsheets  Taken 9/9/2020 0519  Progress: improving  Outcome Summary: pt a/ox4. pt on room air. pt vital signs stable. urine output adequate. will continue to monitor.  Taken 9/9/2020 0400  Plan of Care Reviewed With: patient

## 2020-09-09 NOTE — THERAPY EVALUATION
Acute Care - Occupational Therapy Initial Evaluation  [facility]     Patient Name: Mark Sharma  : 1960  MRN: [id number]  Today's Date: 2020  Onset of Illness/Injury or Date of Surgery: 20  Date of Referral to OT: 20  Referring Physician: CINDY Parker MD     Admit Date: 2020       ICD-10-CM ICD-9-CM   1. Acute respiratory failure with hypoxia (CMS/Formerly [facility]) J96.01 518.81   2. Pneumonia due to organism J18.9 486   3. COPD exacerbation (CMS/Formerly [facility]) J44.1 491.21   4. Dysphagia, unspecified type R13.10 787.20   5. Impaired functional mobility, balance, gait, and endurance Z74.09 V49.89   6. Impaired mobility and ADLs Z74.09 V49.89    Z78.9      Patient Active Problem List   Diagnosis   • Acute respiratory failure with hypoxia (CMS/[facility]ly [facility])   • Aortic stenosis, severe   • Acute systolic CHF (congestive heart failure) (CMS/Formerly [facility])     Past Medical History:   Diagnosis Date   • Hypertension      Past Surgical History:   Procedure Laterality Date   • APPENDECTOMY     • COLONOSCOPY N/A 2019    Procedure: COLONOSCOPY;  Surgeon: Dion Ambrocio DO;  Location: [facility] ENDOSCOPY;  Service: Gastroenterology          OT ASSESSMENT FLOWSHEET (last 12 hours)      Occupational Therapy Evaluation     Row Name 20 0902                   OT Evaluation Time/Intention    Subjective Information  no complaints  -ME        Document Type  evaluation  -ME        Mode of Treatment  individual therapy;occupational therapy  -ME        Total Evaluation Minutes, Occupational Therapy  35  -ME        Patient Effort  good  -ME        Comment  requesting to get up and use the restroom as soon as OT arrived;decreased safety awareness noted throughout   -ME           General Information    Patient Profile Reviewed?  yes  -ME        Onset of Illness/Injury or Date of Surgery  20  -ME        Referring Physician  CINDY Parker MD   -ME        Patient Observations  alert;cooperative;agree to therapy  -ME         Patient/Family Observations  no family present   -ME        General Observations of Patient  sitting EOB with nursing requesting to go to bathroom, tele, catheter   -ME        Prior Level of Function  independent:;all household mobility;community mobility;ADL's;home management;driving;work  -ME        Equipment Currently Used at Home  none  -ME        Pertinent History of Current Functional Problem  to ed on 09/05 with respiratory distress   -ME        Existing Precautions/Restrictions  fall  -ME        Limitations/Impairments  safety/cognitive  -ME        Risks Reviewed  patient:;LOB;nausea/vomiting;dizziness;increased discomfort;change in vital signs  -ME        Benefits Reviewed  patient:;improve function;increase independence;increase strength;decrease pain;increase balance;decrease risk of DVT  -ME           Relationship/Environment    Lives With  significant other  -ME           Resource/Environmental Concerns    Current Living Arrangements  home/apartment/condo  -ME           Cognitive Assessment/Intervention- PT/OT    Orientation Status (Cognition)  oriented x 4  -ME        Follows Commands (Cognition)  follows one step commands;75-90% accuracy;delayed response/completion;increased processing time needed;initiation impaired;physical/tactile prompts required;repetition of directions required;verbal cues/prompting required  -ME        Safety Deficit (Cognitive)  moderate deficit;ability to follow commands;at risk behavior observed;awareness of need for assistance;impulsivity;insight into deficits/self awareness;judgment;problem solving;safety precautions awareness;safety precautions follow-through/compliance  -ME           Safety Issues, Functional Mobility    Safety Issues Affecting Function (Mobility)  ability to follow commands;awareness of need for assistance;insight into deficits/self awareness;impulsivity;judgment;problem solving;safety precaution awareness;safety precautions follow-through/compliance   -ME        Impairments Affecting Function (Mobility)  balance;coordination;endurance/activity tolerance;postural/trunk control;shortness of breath  -ME           Bed Mobility Assessment/Treatment    Bed Mobility Assessment/Treatment  supine-sit  -ME        Supine-Sit Robertson (Bed Mobility)  contact guard;supervision  -ME        Assistive Device (Bed Mobility)  bed rails;head of bed elevated  -ME        Comment (Bed Mobility)  not returned to bed at end of session;left sitting up in chair at end of session   -ME           Functional Mobility    Functional Mobility- Ind. Level  contact guard assist  -ME        Functional Mobility- Device  other (see comments) none   -ME        Functional Mobility- Comment  pt ambulated in room this date with no AD;reaching for furniture; no LOB noted, but pt does have decreased balance and decreased steadiness    -ME           Transfer Assessment/Treatment    Transfer Assessment/Treatment  sit-stand transfer;stand-sit transfer;toilet transfer;bed-chair transfer  -ME        Comment (Transfers)  transfer to chair at end of session   -ME           Bed-Chair Transfer    Bed-Chair Robertson (Transfers)  contact guard  -ME        Assistive Device (Bed-Chair Transfers)  other (see comments) none   -ME           Sit-Stand Transfer    Sit-Stand Robertson (Transfers)  contact guard  -ME        Assistive Device (Sit-Stand Transfers)  other (see comments) none   -ME           Stand-Sit Transfer    Stand-Sit Robertson (Transfers)  contact guard  -ME        Assistive Device (Stand-Sit Transfers)  other (see comments) none   -ME           Toilet Transfer    Type (Toilet Transfer)  sit-stand;stand-sit  -ME        Rober[place]on Level (Toilet Transfer)  contact guard;verbal cues  -ME        Assistive Device (Toilet Transfer)  other (see comments) none   -ME           ADL Assessment/Intervention    BADL Assessment/Intervention  upper body dressing;toileting  -ME           Upper Body  Dressing Assessment/Training    Upper Body Dressing [place] Level  doff;don;set up;minimum assist (75% patient effort) hospital gown   -ME        Upper Body Dressing Position  supported sitting  -ME        Comment (Upper Body Dressing)  sitting up in chair for donning/doffing of hospital gown secondary to pt getting old gown wet   -ME           Toileting Assessment/Training    [place] Level (Toileting)  adjust/manage clothing;contact guard assist;perform perineal hygiene;supervision  -ME        Assistive Devices (Toileting)  commode  -ME        Toileting Position  supported sitting;supported standing  -ME        Comment (Toileting)  in standing for clothing management, sitting for hygiene   -ME           BADL Safety/Performance    Impairments, BADL Safety/Performance  balance;endurance/activity tolerance;coordination;shortness of breath;trunk/postural control  -ME           General ROM    GENERAL ROM COMMENTS  BUE AROM WFL grossly; opposition completed with good accuracy   -ME           MMT (Manual Muscle Testing)    General MMT Comments  BUE strength and  strength grossly 4+ to 5/5; pt is right handed   -ME           Motor Assessment/Interventions    Additional Documentation  Balance (Group)  -ME           Balance    Balance  static sitting balance;static standing balance;dynamic standing balance  -ME           Static Sitting Balance    Level of [place] (Unsupported Sitting, Static Balance)  supervision  -ME        Sitting Position (Unsupported Sitting, Static Balance)  sitting on edge of bed  -ME           Static Standing Balance    Level of [place] (Supported Standing, Static Balance)  contact guard assist  -ME        Time Able to Maintain Position (Supported Standing, Static Balance)  1 to 2 minutes  -ME        Assistive Device Utilized (Supported Standing, Static Balance)  other (see comments) none   -ME           Dynamic Standing Balance    Level of [place], Reaches Outside Midline  (Standing, Dynamic Balance)  contact guard assist  -ME        Time Able to Maintain Position, Reaches Outside Midline (Standing, Dynamic Balance)  1 to 2 minutes  -ME        Assistive Device Utilized (Supported Standing, Dynamic Balance)  other (see comments) none   -ME           Sensory Assessment/Intervention    Sensory General Assessment  no sensation deficits identified;other (see comments) light touch testing   -ME        Additional Documentation  Hearing Assessment (Group);Vision Assessment/Intervention (Group)  -ME           Hearing Assessment    Hearing Status  WFL  -ME           Vision Assessment/Intervention    Visual Impairment/Limitations  corrective lenses full time  -ME           Positioning and Restraints    Pre-Treatment Position  in bed  -ME        Post Treatment Position  chair  -ME        In Chair  notified nsg;sitting;call light within reach;encouraged to call for assist;patient within staff view  -ME           Pain Assessment    Additional Documentation  Pain Scale: Numbers Pre/Post-Treatment (Group)  -ME           Pain Scale: Numbers Pre/Post-Treatment    Pain Scale: Numbers, Pretreatment  0/10 - no pain  -ME        Pain Scale: Numbers, Post-Treatment  0/10 - no pain  -ME           Plan of Care Review    Plan of Care Reviewed With  patient  -ME           Clinical Impression (OT)    Date of Referral to OT  09/08/20  -ME        OT Diagnosis  impaired mobility and ADLs   -ME        Prognosis (OT Eval)  good   -ME        Functional Level at Time of Evaluation (OT Eval)  decreased endurance, decreased balance   -ME        Patient/Family Goals Statement (OT Eval)  to return home   -ME        Criteria for Skilled Therapeutic Interventions Met (OT Eval)  yes;treatment indicated  -ME        Rehab Potential (OT Eval)  good, to achieve stated therapy goals  -ME        Therapy Frequency (OT Eval)  other (see comments) 5-7 days per week   -ME        Predicted Duration of Therapy Intervention (Therapy Eval)   until d/c or all goals met   -ME        Care Plan Review (OT)  evaluation/treatment results reviewed;care plan/treatment goals reviewed;risks/benefits reviewed;current/potential barriers reviewed  -ME        Anticipated Discharge Disposition (OT)  home;home with assist  -ME           Vital Signs    Pre Systolic BP Rehab  120  -ME        Pre Treatment Diastolic BP  83  -ME        Intra Systolic BP Rehab  117 following ambulating to the restroom   -ME        Intra Treatment Diastolic BP  82  -ME        Post Systolic BP Rehab  118  -ME        Post Treatment Diastolic BP  84  -ME        Intratreatment Heart Rate (beats/min)  110 following ambulation   -ME        Posttreatment Heart Rate (beats/min)  102  -ME        Intra SpO2 (%)  96  -ME        O2 Delivery Intra Treatment  room air  -ME        Post SpO2 (%)  93  -ME        O2 Delivery Post Treatment  room air  -ME        Pre Patient Position  Supine  -ME        Intra Patient Position  Sitting  -ME        Post Patient Position  Sitting  -ME           Planned OT Interventions    Planned Therapy Interventions (OT Eval)  adaptive equipment training;activity tolerance training;BADL retraining;IADL retraining;functional balance retraining;occupation/activity based interventions;patient/caregiver education/training;ROM/therapeutic exercise;strengthening exercise;transfer/mobility retraining  -ME           OT Goals    Transfer Goal Selection (OT)  transfer, OT goal 1  -ME        Bathing Goal Selection (OT)  bathing, OT goal 1  -ME        Dressing Goal Selection (OT)  dressing, OT goal 1  -ME        Toileting Goal Selection (OT)  toileting, OT goal 1  -ME        Endurance Goal Selection (OT)  endurance, OT goal 1  -ME        Additional Documentation  Endurance Goal Selection (OT) (Row)  -ME           Transfer Goal 1 (OT)    Activity/Assistive Device (Transfer Goal 1, OT)  toilet  -ME        [place] Level/Cues Needed (Transfer Goal 1, OT)  conditional independence  -ME         Time Frame (Transfer Goal 1, OT)  long term goal (LTG);by discharge  -ME        Progress/Outcome (Transfer Goal 1, OT)  goal not met  -ME           Bathing Goal 1 (OT)    Activity/Assistive Device (Bathing Goal 1, OT)  lower body bathing AD as needed   -ME        [place] Level/Cues Needed (Bathing Goal 1, OT)  conditional independence  -ME        Time Frame (Bathing Goal 1, OT)  long term goal (LTG);by discharge  -ME        Progress/Outcomes (Bathing Goal 1, OT)  goal not met  -ME           Dressing Goal 1 (OT)    Activity/Assistive Device (Dressing Goal 1, OT)  lower body dressing AD as needed   -ME        [place]/Cues Needed (Dressing Goal 1, OT)  conditional independence  -ME        Time Frame (Dressing Goal 1, OT)  long term goal (LTG);by discharge  -ME        Progress/Outcome (Dressing Goal 1, OT)  goal not met  -ME           Toileting Goal 1 (OT)    Activity/Device (Toileting Goal 1, OT)  toileting skills, all  -ME        [place] Level/Cues Needed (Toileting Goal 1, OT)  conditional independence  -ME        Time Frame (Toileting Goal 1, OT)  long term goal (LTG);by discharge  -ME        Progress/Outcome (Toileting Goal 1, OT)  goal not met  -ME            Endurance Goal 1 (OT)    Endurance Goal 1 (OT)  20 min functional activity with proper EC techniques   -ME        Time Frame (Endurance Goal 1, OT)  long term goal (LTG);by discharge  -ME        Progress/Outcome (Endurance Goal 1, OT)  goal not met  -ME           Living Environment    Home Accessibility  other (see comments) has a ramp  -ME          User Key  (r) = Recorded By, (t) = Taken By, (c) = Cosigned By    Initials Name Effective Dates    ME Alexandr Steele OTR/KELLY 08/24/20 -          Occupational Therapy Education                 Title: PT OT SLP Therapies (In Progress)     Topic: Occupational Therapy (In Progress)     Point: ADL training (Done)     Description:   Instruct learner(s) on proper safety adaptation and remediation  techniques during self care or transfers.   Instruct in proper use of assistive devices.              Learning Progress Summary           Patient Acceptance, E, VU,NR by ME at 9/9/2020 1014    Comment:  Educated on OT and POC. Educated to call for assistance. Educated on safety precautions. Educated on proper body mechanics for safe transfers, functional mobility, and ADLs.                   Point: Home exercise program (Not Started)     Description:   Instruct learner(s) on appropriate technique for monitoring, assisting and/or progressing therapeutic exercises/activities.              Learner Progress:   Not documented in this visit.          Point: Precautions (Done)     Description:   Instruct learner(s) on prescribed precautions during self-care and functional transfers.              Learning Progress Summary           Patient Acceptance, E, VU,NR by ME at 9/9/2020 1014    Comment:  Educated on OT and POC. Educated to call for assistance. Educated on safety precautions. Educated on proper body mechanics for safe transfers, functional mobility, and ADLs.                   Point: Body mechanics (Done)     Description:   Instruct learner(s) on proper positioning and spine alignment during self-care, functional mobility activities and/or exercises.              Learning Progress Summary           Patient Acceptance, E, VU,NR by ME at 9/9/2020 1014    Comment:  Educated on OT and POC. Educated to call for assistance. Educated on safety precautions. Educated on proper body mechanics for safe transfers, functional mobility, and ADLs.                               User Key     Initials Effective Dates Name Provider Type Discipline    ME 08/24/20 -  Alexandr Steele OTR/L Occupational Therapist OT                  OT Recommendation and Plan  Outcome Summary/Treatment Plan (OT)  Anticipated Discharge Disposition (OT): home, home with assist  Planned Therapy Interventions (OT Eval): adaptive equipment training, activity  tolerance training, BADL retraining, IADL retraining, functional balance retraining, occupation/activity based interventions, patient/caregiver education/training, ROM/therapeutic exercise, strengthening exercise, transfer/mobility retraining  Therapy Frequency (OT Eval): other (see comments)(5-7 days per week )  Plan of Care Review  Plan of Care Reviewed With: patient  Plan of Care Reviewed With: patient  Outcome Summary: initial OT evaluation complete. pt very pleasant and cooperative throughout, requesting to get up and use the restroom as soon as OT arrived. BUE strength and AROM WFL grossly. sup to sit completed with HOB elevated, bed rails, and CGA. sit to stand and stand to sit transfers completed with no AD and CGA. ambulated to restroom with no AD and CGA. no LOB noted, but pt with decreased balance and steadiness. once in restroom able to transfer to commode with CGA. CGA for clothing management. supervision for hygiene following voiding, and completed in sitting. transferred to chair at bedside with CGA. set up and min A for donning/doffing of hospital gown. recommend further skilled OT Services to address functional mobility and ADL deficits, as well as endurance and balance deficits. recommend home with assistance as needed at discharge. goals established.    Outcome Measures     Row Name 09/09/20 0902             How much help from another is currently needed...    Putting on and taking off regular lower body clothing?  3  -ME      Bathing (including washing, rinsing, and drying)  3  -ME      Toileting (which includes using toilet bed pan or urinal)  3  -ME      Putting on and taking off regular upper body clothing  3  -ME      Taking care of personal grooming (such as brushing teeth)  3  -ME      Eating meals  3  -ME      AM-PAC 6 Clicks Score (OT)  18  -ME         Functional Assessment    Outcome Measure Options  AM-PAC 6 Clicks Daily Activity (OT)  -ME        User Key  (r) = Recorded By, (t) = Taken  By, (c) = Cosigned By    Initials Name Provider Type    Alexandr Encarnacion, OTR/L Occupational Therapist          Time Calculation:   Time Calculation- OT     Row Name 09/09/20 1015             Time Calculation- OT    OT Start Time  0902  -ME      OT Stop Time  0937  -ME      OT Time Calculation (min)  35 min  -ME      OT Received On  09/09/20  -ME      OT Goal Re-Cert Due Date  09/22/20  -ME        User Key  (r) = Recorded By, (t) = Taken By, (c) = Cosigned By    Initials Name Provider Type    Alexandr Encarnacion, OTR/L Occupational Therapist        Therapy Charges for Today     Code Description Service Date Service Provider Modifiers Qty    [id number] HC OT EVAL MOD COMPLEXITY 2 9/9/2020 Alexandr Steele OTR/L GO 1               Alexandr Steele OTR/KELLY  9/9/2020

## 2020-09-09 NOTE — THERAPY TREATMENT NOTE
Acute Care - Physical Therapy Treatment Note  [facility]     Patient Name: Mark Sharma  : 1960  MRN: [id number]  Today's Date: 2020  Onset of Illness/Injury or Date of Surgery: 20     Referring Physician: CINDY Parker MD     Admit Date: 2020    Visit Dx:    ICD-10-CM ICD-9-CM   1. Acute respiratory failure with hypoxia (CMS/HCC) J96.01 518.81   2. Pneumonia due to organism J18.9 486   3. COPD exacerbation (CMS/LTAC, located within [facility]) J44.1 491.21   4. Dysphagia, unspecified type R13.10 787.20   5. Impaired functional mobility, balance, gait, and endurance Z74.09 V49.89   6. Impaired mobility and ADLs Z74.09 V49.89    Z78.9      Patient Active Problem List   Diagnosis   • Acute respiratory failure with hypoxia (CMS/LTAC, located within [facility])   • Aortic stenosis, severe   • Acute systolic CHF (congestive heart failure) (CMS/LTAC, located within [facility])       Therapy Treatment    Rehabilitation Treatment Summary     Row Name 20 1503 20 1215          Treatment Time/Intention    Discipline  --  speech language pathologist  -EC     Document Type  therapy note (daily note)  -TA  discharge treatment  -EC     Subjective Information  no complaints  -TA  no complaints  -EC     Mode of Treatment  individual therapy;physical therapy  -TA  individual therapy  -EC     Patient/Family Observations  --  no family present  -EC     Therapy Frequency (PT Clinical Impression)  5 times/wk  -TA  --     Therapy Frequency (Swallow)  --  1 day per week  -EC     Patient Effort  --  excellent  -EC     Reason Treatment Not Performed  --  -TA  --     Existing Precautions/Restrictions  fall  -TA  --     Recorded by [TA] Haritha Torres PTA 20 1615 [EC] Shira Teixeira CCC-SLP 20 1227     Row Name 20 1503             Vital Signs    Pre Systolic BP Rehab  115  -TA      Pre Treatment Diastolic BP  72  -TA      Post Systolic BP Rehab  118  -TA      Post Treatment Diastolic BP  81  -TA      Pretreatment Heart Rate (beats/min)  89  -TA       Intratreatment Heart Rate (beats/min)  97  -TA      Posttreatment Heart Rate (beats/min)  93  -TA      Pre SpO2 (%)  97  -TA      O2 Delivery Pre Treatment  room air  -TA      Intra SpO2 (%)  93  -TA      O2 Delivery Intra Treatment  room air  -TA      Post SpO2 (%)  97  -TA      O2 Delivery Post Treatment  room air  -TA      Pre Patient Position  Supine  -TA      Post Patient Position  Sitting  -TA      Recorded by [TA] Haritha Torres, PTA 09/09/20 1615      Row Name 09/09/20 1503             Cognitive Assessment/Intervention- PT/OT    Orientation Status (Cognition)  oriented x 4  -TA      Personal Safety Interventions  fall prevention program maintained;gait belt;nonskid shoes/slippers when out of bed;supervised activity  -TA      Recorded by [TA] Haritha Torres, PTA 09/09/20 1615      Row Name 09/09/20 1503             Safety Issues, Functional Mobility    Impairments Affecting Function (Mobility)  balance;coordination;endurance/activity tolerance;strength;shortness of breath  -TA      Recorded by [TA] Haritha Torres, PTA 09/09/20 1615      Row Name 09/09/20 1503             Bed Mobility Assessment/Treatment    Bed Mobility Assessment/Treatment  supine-sit  -TA      Supine-Sit [place] (Bed Mobility)  supervision  -TA      Assistive Device (Bed Mobility)  bed rails;head of bed elevated  -TA      Recorded by [TA] Haritha Torres, PTA 09/09/20 1615      Row Name 09/09/20 1503             Bed-Chair Transfer    Bed-Chair [place] (Transfers)  --  -TA      Recorded by [TA] Haritha Torres, PTA 09/09/20 1615      Row Name 09/09/20 1503             Sit-Stand Transfer    Sit-Stand [place] (Transfers)  contact guard  -TA      Assistive Device (Sit-Stand Transfers)  -- no AD  -TA      Recorded by [TA] Haritha Torres, PTA 09/09/20 1615      Row Name 09/09/20 1503             Stand-Sit Transfer    Stand-Sit [place] (Transfers)  contact guard  -TA      Recorded by [TA] Haritha Torres, PTA 09/09/20 1615       Row Name 09/09/20 1503             Gait/Stairs Assessment/Training    Gait/Stairs Assessment/Training  gait/ambulation independence;gait/ambulation assistive device  -TA      [place] Level (Gait)  minimum assist (75% patient effort)  -TA      Assistive Device (Gait)  -- none  -TA      Distance in Feet (Gait)  100` x 2  -TA      Recorded by [TA] Haritha Torres, PTA 09/09/20 1615      Row Name 09/09/20 1503             Therapeutic Exercise    Lower Extremity (Therapeutic Exercise)  LAQ (long arc quad), bilateral;marching while seated  -TA      Lower Extremity Range of Motion (Therapeutic Exercise)  ankle dorsiflexion/plantar flexion, bilateral;hip abduction/adduction, bilateral  -TA      Exercise Type (Therapeutic Exercise)  AROM (active range of motion)  -TA      Position (Therapeutic Exercise)  seated  -TA      Sets/Reps (Therapeutic Exercise)  20  -TA      Expected Outcome (Therapeutic Exercise)  facilitate normal movement patterns;improve functional stability;improve motor control;increase active range of motion  -TA      Recorded by [TA] Haritha Torres, [facility] 09/09/20 1615      Row Name 09/09/20 1503             Static Sitting Balance    Level of [place] (Unsupported Sitting, Static Balance)  independent  -TA      Recorded by [TA] Haritha Torres, [facility] 09/09/20 1615      Row Name 09/09/20 1503 09/09/20 1215          Positioning and Restraints    Pre-Treatment Position  in bed  -TA  sitting in chair/recliner  -EC     Post Treatment Position  chair  -TA  chair  -EC     In Chair  sitting;call light within reach;with family/caregiver  -TA  sitting;call light within reach;encouraged to call for assist;exit alarm on  -EC     Recorded by [TA] Haritha Torres, PTA 09/09/20 1615 [EC] Shira Teixeira CCC-SLP 09/09/20 1227     Row Name 09/09/20 1503 09/09/20 1215          Pain Scale: Numbers Pre/Post-Treatment    Pain Scale: Numbers, Pretreatment  0/10 - no pain  -TA  0/10 - no pain  -EC     Pain Scale:  Numbers, Post-Treatment  0/10 - no pain  -TA  0/10 - no pain  -EC     Recorded by [TA] Haritha Torres, PTA 09/09/20 1615 [EC] Shira Teixeira CCC-SLP 09/09/20 1227     Row Name 09/09/20 1503             Outcome Summary/Treatment Plan (PT)    Daily Summary of Progress (PT)  progress toward functional goals is good  -TA      Plan for Continued Treatment (PT)  continue  -TA      Anticipated Discharge Disposition (PT)  home with assist;home with home health  -TA      Recorded by [TA] Haritha Torres, PTA 09/09/20 1615        User Key  (r) = Recorded By, (t) = Taken By, (c) = Cosigned By    Initials Name Effective Dates Discipline    EC Shira Teixeira CCC-SLP 02/11/20 -  SLP    TA Haritha Torres, PTA 03/07/18 -  PT               Rehab Goal Summary     Row Name 09/09/20 1503 09/09/20 1215 09/09/20 0902       Bed Mobility Goal 1 (PT)    Activity/Assistive Device (Bed Mobility Goal 1, PT)  sit to supine;supine to sit  -TA  --  --    [place] Level/Cues Needed (Bed Mobility Goal 1, PT)  independent  -TA  --  --    Time Frame (Bed Mobility Goal 1, PT)  by discharge  -TA  --  --    Progress/Outcomes (Bed Mobility Goal 1, PT)  goal not met  -TA  --  --       Transfer Goal 1 (PT)    Activity/Assistive Device (Transfer Goal 1, PT)  sit-to-stand/stand-to-sit;bed-to-chair/chair-to-bed  -TA  --  --    [place] Level/Cues Needed (Transfer Goal 1, PT)  independent  -TA  --  --    Time Frame (Transfer Goal 1, PT)  by discharge  -TA  --  --    Progress/Outcome (Transfer Goal 1, PT)  goal not met  -TA  --  --       Gait Training Goal 1 (PT)    Activity/Assistive Device (Gait Training Goal 1, PT)  gait (walking locomotion);assistive device use;backward stepping  -TA  --  --    [place] Level (Gait Training Goal 1, PT)  independent  -TA  --  --    Distance (Gait Goal 1, PT)  300'x2  -TA  --  --    Time Frame (Gait Training Goal 1, PT)  by discharge  -TA  --  --    Progress/Outcome (Gait Training Goal 1, PT)  goal  not met  -TA  --  --       Patient Education Goal (PT)    Activity (Patient Education Goal, PT)  Patient will improve [place]etti balance and gait assessment to >24/28 to indicate low fall risk.  -TA  --  --    [place]/Cues/Accuracy (Memory Goal 2, PT)  demonstrates adequately  -TA  --  --    Time Frame (Patient Education Goal, PT)  by discharge  -TA  --  --    Progress/Outcome (Patient Education Goal, PT)  goal not met  -TA  --  --       Occupational Therapy Goals    Transfer Goal Selection (OT)  --  --  transfer, OT goal 1  -ME    Bathing Goal Selection (OT)  --  --  bathing, OT goal 1  -ME    Dressing Goal Selection (OT)  --  --  dressing, OT goal 1  -ME    Toileting Goal Selection (OT)  --  --  toileting, OT goal 1  -ME    Endurance Goal Selection (OT)  --  --  endurance, OT goal 1  -ME       Transfer Goal 1 (OT)    Activity/Assistive Device (Transfer Goal 1, OT)  --  --  toilet  -ME    [place] Level/Cues Needed (Transfer Goal 1, OT)  --  --  conditional independence  -ME    Time Frame (Transfer Goal 1, OT)  --  --  long term goal (LTG);by discharge  -ME    Progress/Outcome (Transfer Goal 1, OT)  --  --  goal not met  -ME       Bathing Goal 1 (OT)    Activity/Assistive Device (Bathing Goal 1, OT)  --  --  lower body bathing AD as needed   -ME    [place] Level/Cues Needed (Bathing Goal 1, OT)  --  --  conditional independence  -ME    Time Frame (Bathing Goal 1, OT)  --  --  long term goal (LTG);by discharge  -ME    Progress/Outcomes (Bathing Goal 1, OT)  --  --  goal not met  -ME       Dressing Goal 1 (OT)    Activity/Assistive Device (Dressing Goal 1, OT)  --  --  lower body dressing AD as needed   -ME    [place]/Cues Needed (Dressing Goal 1, OT)  --  --  conditional independence  -ME    Time Frame (Dressing Goal 1, OT)  --  --  long term goal (LTG);by discharge  -ME    Progress/Outcome (Dressing Goal 1, OT)  --  --  goal not met  -ME       Toileting Goal 1 (OT)    Activity/Device (Toileting Goal  1, OT)  --  --  toileting skills, all  -ME    [place] Level/Cues Needed (Toileting Goal 1, OT)  --  --  conditional independence  -ME    Time Frame (Toileting Goal 1, OT)  --  --  long term goal (LTG);by discharge  -ME    Progress/Outcome (Toileting Goal 1, OT)  --  --  goal not met  -ME        Endurance Goal 1 (OT)    Endurance Goal 1 (OT)  --  --  20 min functional activity with proper EC techniques   -ME    Time Frame (Endurance Goal 1, OT)  --  --  long term goal (LTG);by discharge  -ME    Progress/Outcome (Endurance Goal 1, OT)  --  --  goal not met  -ME       Swallow Goals (SLP)    Oral Nutrition/Hydration Goal Selection (SLP)  --  oral nutrition/hydration, SLP goal 1  -EC  --       Oral Nutrition/Hydration Goal 1 (SLP)    Oral Nutrition/Hydration Goal 1, SLP  --  Pt to tolerate least restrictive diet with no s/s of aspiraiton for adequate nutrition and hydration.   -EC  --    Time Frame (Oral Nutrition/Hydration Goal 1, SLP)  --  by discharge  -EC  --    Barriers (Oral Nutrition/Hydration Goal 1, SLP)  --  edentuous  -EC  --    Progress/Outcomes (Oral Nutrition/Hydration Goal 1, SLP)  --  goal partially met  -EC  --      User Key  (r) = Recorded By, (t) = Taken By, (c) = Cosigned By    Initials Name Provider Type Discipline    EC Shira Teixeira, CCC-SLP Speech and Language Pathologist SLP    Haritha Santoyo, PTA Physical Therapy Assistant PT    Alexandr Encarnacion OTR/L Occupational Therapist OT          Physical Therapy Education                 Title: PT OT SLP Therapies (In Progress)     Topic: Physical Therapy (In Progress)     Point: Mobility training (Done)     Description:   Instruct learner(s) on safety and technique for assisting patient out of bed, chair or wheelchair.  Instruct in the proper use of assistive devices, such as walker, crutches, cane or brace.              Patient Friendly Description:   It's important to get you on your feet again, but we need to do so in a way that is  safe for you. Falling has serious consequences, and your personal safety is the most important thing of all.        When it's time to get out of bed, one of us or a family member will sit next to you on the bed to give you support.     If your doctor or nurse tells you to use a walker, crutches, a cane, or a brace, be sure you use it every time you get out of bed, even if you think you don't need it.    Learning Progress Summary           Patient Acceptance, E,TB, VU by LR at 9/8/2020 1502    Comment:  PT POC, role of therapy, and expectations.   Significant Other Acceptance, E,TB, VU by LR at 9/8/2020 1502    Comment:  PT POC, role of therapy, and expectations.                   Point: Home exercise program (Not Started)     Description:   Instruct learner(s) on appropriate technique for monitoring, assisting and/or progressing patient with therapeutic exercises and activities.              Learner Progress:   Not documented in this visit.          Point: Body mechanics (Not Started)     Description:   Instruct learner(s) on proper positioning and spine alignment for patient and/or caregiver during mobility tasks and/or exercises.              Learner Progress:   Not documented in this visit.          Point: Precautions (Not Started)     Description:   Instruct learner(s) on prescribed precautions during mobility and gait tasks              Learner Progress:   Not documented in this visit.                      User Key     Initials Effective Dates Name Provider Type Discipline    LR 06/25/19 -  Bhupinder Gasca Physical Therapist PT                PT Recommendation and Plan  Anticipated Discharge Disposition (PT): home with assist, home with home health  Therapy Frequency (PT Clinical Impression): 5 times/wk  Outcome Summary/Treatment Plan (PT)  Daily Summary of Progress (PT): progress toward functional goals is good  Plan for Continued Treatment (PT): continue  Anticipated Discharge Disposition (PT): home with  assist, home with home health  Outcome Summary: pt sup>sit with SBA, pt  sit<>stand with CGA, pt ambulated 100` x 2 with min assist. pt would benefit from continued PT services  Outcome Measures     Row Name 09/09/20 1600 09/09/20 0902          How much help from another person do you currently need...    Turning from your back to your side while in flat bed without using bedrails?  3  -TA  --     Moving from lying on back to sitting on the side of a flat bed without bedrails?  3  -TA  --     Moving to and from a bed to a chair (including a wheelchair)?  3  -TA  --     Standing up from a chair using your arms (e.g., wheelchair, bedside chair)?  3  -TA  --     Climbing 3-5 steps with a railing?  3  -TA  --     To walk in hospital room?  3  -TA  --     AM-PAC 6 Clicks Score (PT)  18  -TA  --        How much help from another is currently needed...    Putting on and taking off regular lower body clothing?  --  3  -ME     Bathing (including washing, rinsing, and drying)  --  3  -ME     Toileting (which includes using toilet bed pan or urinal)  --  3  -ME     Putting on and taking off regular upper body clothing  --  3  -ME     Taking care of personal grooming (such as brushing teeth)  --  3  -ME     Eating meals  --  3  -ME     AM-PAC 6 Clicks Score (OT)  --  18  -ME        Functional Assessment    Outcome Measure Options  AM-PAC 6 Clicks Basic Mobility (PT)  -TA  AM-PAC 6 Clicks Daily Activity (OT)  -ME       User Key  (r) = Recorded By, (t) = Taken By, (c) = Cosigned By    Initials Name Provider Type    Haritha Santoyo PTA Physical Therapy Assistant    Alexandr Encarnacion, OTR/L Occupational Therapist         Time Calculation:   PT Charges     Row Name 09/09/20 1618             Time Calculation    Start Time  1503  -TA      Stop Time  1530  -TA      Time Calculation (min)  27 min  -TA      PT Received On  09/09/20  -TA         Time Calculation- PT    Total Timed Code Minutes- PT  27 minute(s)  -TA        User Key   (r) = Recorded By, (t) = Taken By, (c) = Cosigned By    Initials Name Provider Type    TA Haritha Torres, MARQUIS Physical Therapy Assistant        Therapy Charges for Today     Code Description Service Date Service Provider Modifiers Qty    27034771945 HC GAIT TRAINING EA 15 MIN 9/9/2020 Haritha Torres, MARQUIS GP 1    27776504268 HC PT THER PROC EA 15 MIN 9/9/2020 Haritha Torres, MARQUIS GP 1          PT G-Codes  Outcome Measure Options: AM-PAC 6 Clicks Basic Mobility (PT)  AM-PAC 6 Clicks Score (PT): 18  AM-PAC 6 Clicks Score (OT): 18  [place] Total Score: 17    Haritha Torres PTA  9/9/2020

## 2020-09-10 LAB
ACT BLD: 183 SECONDS (ref 82–152)
ALBUMIN SERPL-MCNC: 3.6 G/DL (ref 3.5–5.2)
ALBUMIN SERPL-MCNC: 4.2 G/DL (ref 3.5–5.2)
ALBUMIN/GLOB SERPL: 1.5 G/DL
ALBUMIN/GLOB SERPL: 1.5 G/DL
ALP SERPL-CCNC: 69 U/L (ref 39–117)
ALP SERPL-CCNC: 81 U/L (ref 39–117)
ALT SERPL W P-5'-P-CCNC: 43 U/L (ref 1–41)
ALT SERPL W P-5'-P-CCNC: 52 U/L (ref 1–41)
ANION GAP SERPL CALCULATED.3IONS-SCNC: 12 MMOL/L (ref 5–15)
ANION GAP SERPL CALCULATED.3IONS-SCNC: 12 MMOL/L (ref 5–15)
AST SERPL-CCNC: 33 U/L (ref 1–40)
AST SERPL-CCNC: 38 U/L (ref 1–40)
BACTERIA SPEC AEROBE CULT: NORMAL
BASOPHILS # BLD AUTO: 0.03 10*3/MM3 (ref 0–0.2)
BASOPHILS NFR BLD AUTO: 0.4 % (ref 0–1.5)
BILIRUB SERPL-MCNC: 0.7 MG/DL (ref 0–1.2)
BILIRUB SERPL-MCNC: 1 MG/DL (ref 0–1.2)
BUN SERPL-MCNC: 21 MG/DL (ref 6–20)
BUN SERPL-MCNC: 22 MG/DL (ref 6–20)
BUN/CREAT SERPL: 24.7 (ref 7–25)
BUN/CREAT SERPL: 25 (ref 7–25)
CALCIUM SPEC-SCNC: 8.9 MG/DL (ref 8.6–10.5)
CALCIUM SPEC-SCNC: 9.4 MG/DL (ref 8.6–10.5)
CHLORIDE SERPL-SCNC: 96 MMOL/L (ref 98–107)
CHLORIDE SERPL-SCNC: 99 MMOL/L (ref 98–107)
CO2 SERPL-SCNC: 29 MMOL/L (ref 22–29)
CO2 SERPL-SCNC: 29 MMOL/L (ref 22–29)
CREAT SERPL-MCNC: 0.85 MG/DL (ref 0.76–1.27)
CREAT SERPL-MCNC: 0.88 MG/DL (ref 0.76–1.27)
DEPRECATED RDW RBC AUTO: 42.2 FL (ref 37–54)
DEPRECATED RDW RBC AUTO: 42.5 FL (ref 37–54)
EOSINOPHIL # BLD AUTO: 0.53 10*3/MM3 (ref 0–0.4)
EOSINOPHIL NFR BLD AUTO: 6.5 % (ref 0.3–6.2)
ERYTHROCYTE [DISTWIDTH] IN BLOOD BY AUTOMATED COUNT: 12.1 % (ref 12.3–15.4)
ERYTHROCYTE [DISTWIDTH] IN BLOOD BY AUTOMATED COUNT: 12.3 % (ref 12.3–15.4)
GFR SERPL CREATININE-BSD FRML MDRD: 89 ML/MIN/1.73
GFR SERPL CREATININE-BSD FRML MDRD: 92 ML/MIN/1.73
GLOBULIN UR ELPH-MCNC: 2.4 GM/DL
GLOBULIN UR ELPH-MCNC: 2.8 GM/DL
GLUCOSE SERPL-MCNC: 103 MG/DL (ref 65–99)
GLUCOSE SERPL-MCNC: 158 MG/DL (ref 65–99)
HCT VFR BLD AUTO: 42.4 % (ref 37.5–51)
HCT VFR BLD AUTO: 45.8 % (ref 37.5–51)
HGB BLD-MCNC: 14.8 G/DL (ref 13–17.7)
HGB BLD-MCNC: 16.1 G/DL (ref 13–17.7)
IMM GRANULOCYTES # BLD AUTO: 0.02 10*3/MM3 (ref 0–0.05)
IMM GRANULOCYTES NFR BLD AUTO: 0.2 % (ref 0–0.5)
INR PPP: 1.09 (ref 0.8–1.2)
LYMPHOCYTES # BLD AUTO: 1.59 10*3/MM3 (ref 0.7–3.1)
LYMPHOCYTES NFR BLD AUTO: 19.5 % (ref 19.6–45.3)
MAGNESIUM SERPL-MCNC: 2.2 MG/DL (ref 1.6–2.6)
MCH RBC QN AUTO: 32.8 PG (ref 26.6–33)
MCH RBC QN AUTO: 33 PG (ref 26.6–33)
MCHC RBC AUTO-ENTMCNC: 34.9 G/DL (ref 31.5–35.7)
MCHC RBC AUTO-ENTMCNC: 35.2 G/DL (ref 31.5–35.7)
MCV RBC AUTO: 93.9 FL (ref 79–97)
MCV RBC AUTO: 94 FL (ref 79–97)
MONOCYTES # BLD AUTO: 0.77 10*3/MM3 (ref 0.1–0.9)
MONOCYTES NFR BLD AUTO: 9.4 % (ref 5–12)
NEUTROPHILS NFR BLD AUTO: 5.22 10*3/MM3 (ref 1.7–7)
NEUTROPHILS NFR BLD AUTO: 64 % (ref 42.7–76)
NRBC BLD AUTO-RTO: 0 /100 WBC (ref 0–0.2)
PHOSPHATE SERPL-MCNC: 5.1 MG/DL (ref 2.5–4.5)
PLATELET # BLD AUTO: 171 10*3/MM3 (ref 140–450)
PLATELET # BLD AUTO: 188 10*3/MM3 (ref 140–450)
PMV BLD AUTO: 11 FL (ref 6–12)
PMV BLD AUTO: 11.1 FL (ref 6–12)
POTASSIUM SERPL-SCNC: 3.4 MMOL/L (ref 3.5–5.2)
POTASSIUM SERPL-SCNC: 3.5 MMOL/L (ref 3.5–5.2)
PROT SERPL-MCNC: 6 G/DL (ref 6–8.5)
PROT SERPL-MCNC: 7 G/DL (ref 6–8.5)
PROTHROMBIN TIME: 14.6 SECONDS (ref 11.1–15.3)
RBC # BLD AUTO: 4.51 10*6/MM3 (ref 4.14–5.8)
RBC # BLD AUTO: 4.88 10*6/MM3 (ref 4.14–5.8)
SODIUM SERPL-SCNC: 137 MMOL/L (ref 136–145)
SODIUM SERPL-SCNC: 140 MMOL/L (ref 136–145)
WBC # BLD AUTO: 8.16 10*3/MM3 (ref 3.4–10.8)
WBC # BLD AUTO: 8.61 10*3/MM3 (ref 3.4–10.8)

## 2020-09-10 PROCEDURE — C1894 INTRO/SHEATH, NON-LASER: HCPCS | Performed by: INTERNAL MEDICINE

## 2020-09-10 PROCEDURE — 93458 L HRT ARTERY/VENTRICLE ANGIO: CPT | Performed by: INTERNAL MEDICINE

## 2020-09-10 PROCEDURE — 80053 COMPREHEN METABOLIC PANEL: CPT | Performed by: INTERNAL MEDICINE

## 2020-09-10 PROCEDURE — 25010000002 FENTANYL CITRATE (PF) 100 MCG/2ML SOLUTION: Performed by: INTERNAL MEDICINE

## 2020-09-10 PROCEDURE — 94799 UNLISTED PULMONARY SVC/PX: CPT

## 2020-09-10 PROCEDURE — 25010000002 THIAMINE PER 100 MG: Performed by: INTERNAL MEDICINE

## 2020-09-10 PROCEDURE — 85027 COMPLETE CBC AUTOMATED: CPT | Performed by: INTERNAL MEDICINE

## 2020-09-10 PROCEDURE — 027034Z DILATION OF CORONARY ARTERY, ONE ARTERY WITH DRUG-ELUTING INTRALUMINAL DEVICE, PERCUTANEOUS APPROACH: ICD-10-PCS | Performed by: INTERNAL MEDICINE

## 2020-09-10 PROCEDURE — 85025 COMPLETE CBC W/AUTO DIFF WBC: CPT | Performed by: HOSPITALIST

## 2020-09-10 PROCEDURE — C9600 PERC DRUG-EL COR STENT SING: HCPCS | Performed by: INTERNAL MEDICINE

## 2020-09-10 PROCEDURE — 84100 ASSAY OF PHOSPHORUS: CPT | Performed by: HOSPITALIST

## 2020-09-10 PROCEDURE — 25010000002 ENOXAPARIN PER 10 MG: Performed by: INTERNAL MEDICINE

## 2020-09-10 PROCEDURE — C1887 CATHETER, GUIDING: HCPCS | Performed by: INTERNAL MEDICINE

## 2020-09-10 PROCEDURE — B2111ZZ FLUOROSCOPY OF MULTIPLE CORONARY ARTERIES USING LOW OSMOLAR CONTRAST: ICD-10-PCS | Performed by: INTERNAL MEDICINE

## 2020-09-10 PROCEDURE — 25010000002 MIDAZOLAM PER 1 MG: Performed by: INTERNAL MEDICINE

## 2020-09-10 PROCEDURE — 93005 ELECTROCARDIOGRAM TRACING: CPT | Performed by: INTERNAL MEDICINE

## 2020-09-10 PROCEDURE — 0 IOPAMIDOL PER 1 ML: Performed by: INTERNAL MEDICINE

## 2020-09-10 PROCEDURE — 25010000002 FUROSEMIDE PER 20 MG: Performed by: INTERNAL MEDICINE

## 2020-09-10 PROCEDURE — 80053 COMPREHEN METABOLIC PANEL: CPT | Performed by: HOSPITALIST

## 2020-09-10 PROCEDURE — 25010000002 BIVALIRUDIN TRIFLUOROACETATE 250 MG RECONSTITUTED SOLUTION 1 EACH VIAL: Performed by: INTERNAL MEDICINE

## 2020-09-10 PROCEDURE — 83735 ASSAY OF MAGNESIUM: CPT | Performed by: HOSPITALIST

## 2020-09-10 PROCEDURE — 99233 SBSQ HOSP IP/OBS HIGH 50: CPT | Performed by: INTERNAL MEDICINE

## 2020-09-10 PROCEDURE — C1874 STENT, COATED/COV W/DEL SYS: HCPCS | Performed by: INTERNAL MEDICINE

## 2020-09-10 PROCEDURE — 92928 PRQ TCAT PLMT NTRAC ST 1 LES: CPT | Performed by: INTERNAL MEDICINE

## 2020-09-10 PROCEDURE — C1769 GUIDE WIRE: HCPCS | Performed by: INTERNAL MEDICINE

## 2020-09-10 PROCEDURE — 93010 ELECTROCARDIOGRAM REPORT: CPT | Performed by: INTERNAL MEDICINE

## 2020-09-10 PROCEDURE — 85347 COAGULATION TIME ACTIVATED: CPT

## 2020-09-10 PROCEDURE — 85610 PROTHROMBIN TIME: CPT | Performed by: INTERNAL MEDICINE

## 2020-09-10 PROCEDURE — 97535 SELF CARE MNGMENT TRAINING: CPT

## 2020-09-10 PROCEDURE — 4A023N7 MEASUREMENT OF CARDIAC SAMPLING AND PRESSURE, LEFT HEART, PERCUTANEOUS APPROACH: ICD-10-PCS | Performed by: INTERNAL MEDICINE

## 2020-09-10 DEVICE — XIENCE SIERRA™ EVEROLIMUS ELUTING CORONARY STENT SYSTEM 3.25 MM X 23 MM / RAPID-EXCHANGE
Type: IMPLANTABLE DEVICE | Site: CORONARY | Status: FUNCTIONAL
Brand: XIENCE SIERRA™

## 2020-09-10 RX ORDER — ASPIRIN 81 MG/1
TABLET, CHEWABLE ORAL AS NEEDED
Status: DISCONTINUED | OUTPATIENT
Start: 2020-09-10 | End: 2020-09-10 | Stop reason: HOSPADM

## 2020-09-10 RX ORDER — SODIUM CHLORIDE 9 MG/ML
INJECTION, SOLUTION INTRAVENOUS
Status: COMPLETED
Start: 2020-09-10 | End: 2020-09-11

## 2020-09-10 RX ORDER — SODIUM CHLORIDE 9 MG/ML
250 INJECTION, SOLUTION INTRAVENOUS ONCE AS NEEDED
Status: DISCONTINUED | OUTPATIENT
Start: 2020-09-10 | End: 2020-09-11 | Stop reason: HOSPADM

## 2020-09-10 RX ORDER — ACETAMINOPHEN 325 MG/1
650 TABLET ORAL EVERY 4 HOURS PRN
Status: DISCONTINUED | OUTPATIENT
Start: 2020-09-10 | End: 2020-09-11 | Stop reason: HOSPADM

## 2020-09-10 RX ORDER — POTASSIUM CHLORIDE 750 MG/1
40 CAPSULE, EXTENDED RELEASE ORAL AS NEEDED
Status: DISCONTINUED | OUTPATIENT
Start: 2020-09-10 | End: 2020-09-11 | Stop reason: HOSPADM

## 2020-09-10 RX ORDER — DIPHENHYDRAMINE HYDROCHLORIDE 50 MG/ML
25 INJECTION INTRAMUSCULAR; INTRAVENOUS EVERY 6 HOURS PRN
Status: DISCONTINUED | OUTPATIENT
Start: 2020-09-10 | End: 2020-09-11 | Stop reason: HOSPADM

## 2020-09-10 RX ORDER — TEMAZEPAM 15 MG/1
15 CAPSULE ORAL NIGHTLY PRN
Status: DISCONTINUED | OUTPATIENT
Start: 2020-09-10 | End: 2020-09-11 | Stop reason: HOSPADM

## 2020-09-10 RX ORDER — SODIUM CHLORIDE 9 MG/ML
100 INJECTION, SOLUTION INTRAVENOUS CONTINUOUS
Status: DISCONTINUED | OUTPATIENT
Start: 2020-09-10 | End: 2020-09-11 | Stop reason: HOSPADM

## 2020-09-10 RX ORDER — MIDAZOLAM HYDROCHLORIDE 1 MG/ML
INJECTION INTRAMUSCULAR; INTRAVENOUS AS NEEDED
Status: DISCONTINUED | OUTPATIENT
Start: 2020-09-10 | End: 2020-09-10 | Stop reason: HOSPADM

## 2020-09-10 RX ORDER — ONDANSETRON 2 MG/ML
4 INJECTION INTRAMUSCULAR; INTRAVENOUS EVERY 6 HOURS PRN
Status: DISCONTINUED | OUTPATIENT
Start: 2020-09-10 | End: 2020-09-11 | Stop reason: HOSPADM

## 2020-09-10 RX ORDER — PRASUGREL 10 MG/1
10 TABLET, FILM COATED ORAL DAILY
Status: DISCONTINUED | OUTPATIENT
Start: 2020-09-11 | End: 2020-09-11 | Stop reason: HOSPADM

## 2020-09-10 RX ORDER — FENTANYL CITRATE 50 UG/ML
25 INJECTION, SOLUTION INTRAMUSCULAR; INTRAVENOUS
Status: DISCONTINUED | OUTPATIENT
Start: 2020-09-10 | End: 2020-09-11 | Stop reason: HOSPADM

## 2020-09-10 RX ORDER — NALOXONE HCL 0.4 MG/ML
0.4 VIAL (ML) INJECTION
Status: DISCONTINUED | OUTPATIENT
Start: 2020-09-10 | End: 2020-09-11 | Stop reason: HOSPADM

## 2020-09-10 RX ORDER — SODIUM CHLORIDE 0.9 % (FLUSH) 0.9 %
3 SYRINGE (ML) INJECTION EVERY 12 HOURS SCHEDULED
Status: DISCONTINUED | OUTPATIENT
Start: 2020-09-10 | End: 2020-09-10 | Stop reason: HOSPADM

## 2020-09-10 RX ORDER — ONDANSETRON 4 MG/1
4 TABLET, FILM COATED ORAL EVERY 6 HOURS PRN
Status: DISCONTINUED | OUTPATIENT
Start: 2020-09-10 | End: 2020-09-11 | Stop reason: HOSPADM

## 2020-09-10 RX ORDER — ALUMINA, MAGNESIA, AND SIMETHICONE 2400; 2400; 240 MG/30ML; MG/30ML; MG/30ML
15 SUSPENSION ORAL EVERY 6 HOURS PRN
Status: DISCONTINUED | OUTPATIENT
Start: 2020-09-10 | End: 2020-09-11 | Stop reason: HOSPADM

## 2020-09-10 RX ORDER — PRASUGREL 10 MG/1
TABLET, FILM COATED ORAL AS NEEDED
Status: DISCONTINUED | OUTPATIENT
Start: 2020-09-10 | End: 2020-09-10 | Stop reason: HOSPADM

## 2020-09-10 RX ORDER — SODIUM CHLORIDE 9 MG/ML
INJECTION, SOLUTION INTRAVENOUS
Status: COMPLETED
Start: 2020-09-10 | End: 2020-09-10

## 2020-09-10 RX ORDER — FUROSEMIDE 40 MG/1
40 TABLET ORAL
Status: DISCONTINUED | OUTPATIENT
Start: 2020-09-10 | End: 2020-09-11 | Stop reason: HOSPADM

## 2020-09-10 RX ORDER — HYDROCODONE BITARTRATE AND ACETAMINOPHEN 5; 325 MG/1; MG/1
1 TABLET ORAL EVERY 4 HOURS PRN
Status: DISCONTINUED | OUTPATIENT
Start: 2020-09-10 | End: 2020-09-11 | Stop reason: HOSPADM

## 2020-09-10 RX ORDER — FENTANYL CITRATE 50 UG/ML
INJECTION, SOLUTION INTRAMUSCULAR; INTRAVENOUS AS NEEDED
Status: DISCONTINUED | OUTPATIENT
Start: 2020-09-10 | End: 2020-09-10 | Stop reason: HOSPADM

## 2020-09-10 RX ORDER — SODIUM CHLORIDE 0.9 % (FLUSH) 0.9 %
10 SYRINGE (ML) INJECTION AS NEEDED
Status: DISCONTINUED | OUTPATIENT
Start: 2020-09-10 | End: 2020-09-10 | Stop reason: HOSPADM

## 2020-09-10 RX ORDER — ALPRAZOLAM 0.25 MG/1
0.25 TABLET ORAL 3 TIMES DAILY PRN
Status: DISCONTINUED | OUTPATIENT
Start: 2020-09-10 | End: 2020-09-11 | Stop reason: HOSPADM

## 2020-09-10 RX ORDER — LIDOCAINE HYDROCHLORIDE 20 MG/ML
INJECTION, SOLUTION INFILTRATION; PERINEURAL AS NEEDED
Status: DISCONTINUED | OUTPATIENT
Start: 2020-09-10 | End: 2020-09-10 | Stop reason: HOSPADM

## 2020-09-10 RX ORDER — ASPIRIN 81 MG/1
81 TABLET ORAL DAILY
Status: DISCONTINUED | OUTPATIENT
Start: 2020-09-11 | End: 2020-09-11 | Stop reason: HOSPADM

## 2020-09-10 RX ORDER — THIAMINE MONONITRATE (VIT B1) 100 MG
100 TABLET ORAL DAILY
Status: DISCONTINUED | OUTPATIENT
Start: 2020-09-11 | End: 2020-09-11 | Stop reason: HOSPADM

## 2020-09-10 RX ADMIN — FUROSEMIDE 40 MG: 10 INJECTION, SOLUTION INTRAMUSCULAR; INTRAVENOUS at 08:18

## 2020-09-10 RX ADMIN — FUROSEMIDE 40 MG: 40 TABLET ORAL at 20:05

## 2020-09-10 RX ADMIN — SODIUM CHLORIDE 100 ML/HR: 9 INJECTION, SOLUTION INTRAVENOUS at 20:06

## 2020-09-10 RX ADMIN — Medication 3 ML: at 10:57

## 2020-09-10 RX ADMIN — Medication 2.5 MG: at 08:18

## 2020-09-10 RX ADMIN — FAMOTIDINE 20 MG: 10 INJECTION INTRAVENOUS at 08:19

## 2020-09-10 RX ADMIN — IPRATROPIUM BROMIDE AND ALBUTEROL SULFATE 3 ML: 2.5; .5 SOLUTION RESPIRATORY (INHALATION) at 12:08

## 2020-09-10 RX ADMIN — POTASSIUM CHLORIDE 40 MEQ: 10 CAPSULE, COATED, EXTENDED RELEASE ORAL at 11:11

## 2020-09-10 RX ADMIN — BUDESONIDE AND FORMOTEROL FUMARATE DIHYDRATE 2 PUFF: 160; 4.5 AEROSOL RESPIRATORY (INHALATION) at 07:45

## 2020-09-10 RX ADMIN — FAMOTIDINE 20 MG: 10 INJECTION INTRAVENOUS at 20:05

## 2020-09-10 RX ADMIN — IPRATROPIUM BROMIDE AND ALBUTEROL SULFATE 3 ML: 2.5; .5 SOLUTION RESPIRATORY (INHALATION) at 07:45

## 2020-09-10 RX ADMIN — SODIUM CHLORIDE, PRESERVATIVE FREE 10 ML: 5 INJECTION INTRAVENOUS at 20:12

## 2020-09-10 RX ADMIN — THIAMINE HYDROCHLORIDE 100 MG: 100 INJECTION, SOLUTION INTRAMUSCULAR; INTRAVENOUS at 08:18

## 2020-09-10 RX ADMIN — SODIUM CHLORIDE, PRESERVATIVE FREE 10 ML: 5 INJECTION INTRAVENOUS at 08:19

## 2020-09-10 NOTE — PLAN OF CARE
Problem: Patient Care Overview  Goal: Plan of Care Review  Outcome: Ongoing (interventions implemented as appropriate)  Flowsheets  Taken 9/10/2020 1153 by Jacqueline Vale, STEPHANIE/L  Progress: improving  Outcome Summary: Pt tolerated tx well this date. Pt completed an ADL. Pt was SBA with all transfers and functional mobility. Continue OT POC.  Taken 9/10/2020 0000 by Nancy Cuellar, RN  Plan of Care Reviewed With: patient

## 2020-09-10 NOTE — PROGRESS NOTES
[facility]  INPATIENT PROGRESS NOTE    Length of Stay: 5  Date of Admission: 9/5/2020  Primary Care Physician: Tyler Singh MD    Subjective   Chief Complaint: Respiratory failure.    HPI: Patient is seen for follow-up. No new complaints. Plan for cardiac cath this afternoon. No shortness of breath. No chest pain.     Review of Systems   Constitutional: Negative for activity change, appetite change, chills, diaphoresis, fatigue and fever.   HENT: Negative for trouble swallowing and voice change.    Eyes: Negative for photophobia and visual disturbance.   Respiratory: Negative for cough, choking, chest tightness, shortness of breath, wheezing and stridor.    Cardiovascular: Negative for chest pain, palpitations and leg swelling.   Gastrointestinal: Negative for abdominal distention, abdominal pain, blood in stool, constipation, diarrhea, nausea and vomiting.   Endocrine: Negative for cold intolerance, heat intolerance, polydipsia, polyphagia and polyuria.   Genitourinary: Negative for decreased urine volume, difficulty urinating, dysuria, enuresis, flank pain, frequency, hematuria and urgency.   Musculoskeletal: Negative for arthralgias, gait problem, myalgias, neck pain and neck stiffness.   Skin: Negative for pallor, rash and wound.   Neurological: Negative for dizziness, tremors, seizures, syncope, facial asymmetry, speech difficulty, weakness, light-headedness, numbness and headaches.   Hematological: Does not bruise/bleed easily.   Psychiatric/Behavioral: Negative for agitation, behavioral problems and confusion.       Objective    Temp:  [97.8 °F (36.6 °C)-98.4 °F (36.9 °C)] 98.4 °F (36.9 °C)  Heart Rate:  [71-90] 83  Resp:  [15-20] 15  BP: (102-120)/(69-81) 118/74      Physical Exam   Constitutional: He is oriented to person, place, and time. He appears well-developed and well-nourished. No distress.   Sitting up in chair   HENT:   Head:  Normocephalic and atraumatic.   Eyes: Pupils are equal, round, and reactive to light. EOM are normal. No scleral icterus.   Neck: Normal range of motion. Neck supple. No JVD present. No thyromegaly present.   Cardiovascular: Normal rate and regular rhythm. Exam reveals no gallop and no friction rub.   Murmur (systolic) heard.  Pulmonary/Chest: Effort normal and breath sounds normal. He has no decreased breath sounds. He has no wheezes. He has no rales. He exhibits no tenderness.   Abdominal: Soft. Bowel sounds are normal. He exhibits no distension and no mass. There is no tenderness. There is no rebound and no guarding.   Musculoskeletal: He exhibits no edema, tenderness or deformity.   Neurological: He is alert and oriented to person, place, and time. No cranial nerve deficit or sensory deficit. He exhibits normal muscle tone. Coordination normal.   Skin: Skin is warm and dry. No rash noted. He is not diaphoretic. No erythema. No pallor.   Psychiatric: He has a normal mood and affect. His behavior is normal. Judgment and thought content normal.   Nursing note and vitals reviewed.        Medication Review:    Current Facility-Administered Medications:   •  bisoprolol (ZEBeta) half tablet 2.5 mg, 2.5 mg, Oral, Q24H, Veronica Gonzalez MD, 2.5 mg at 09/10/20 0818  •  budesonide-formoterol (SYMBICORT) 160-4.5 MCG/ACT inhaler 2 puff, 2 puff, Inhalation, BID - RT, Iker Parker MD, 2 puff at 09/10/20 0745  •  cefTRIAXone (ROCEPHIN) 1 g/100 mL 0.9% NS (MBP), 1 g, Intravenous, Q24H, Iker Parker MD, 1 g at 09/09/20 1323  •  famotidine (PEPCID) injection 20 mg, 20 mg, Intravenous, Q12H, Madi Griffith MD, 20 mg at 09/10/20 0819  •  fentaNYL citrate (PF) (SUBLIMAZE) injection 25 mcg, 25 mcg, Intravenous, Q2H PRN, Iker Parker MD  •  furosemide (LASIX) tablet 40 mg, 40 mg, Oral, BID, Veronica Gonzalez MD  •  ipratropium-albuterol (DUO-NEB) nebulizer solution 3 mL, 3 mL,  Nebulization, 4x Daily - RT, Iker Parker MD, 3 mL at 09/10/20 0745  •  LORazepam (ATIVAN) tablet 1 mg, 1 mg, Oral, Q2H PRN **OR** LORazepam (ATIVAN) injection 1 mg, 1 mg, Intravenous, Q2H PRN **OR** LORazepam (ATIVAN) tablet 2 mg, 2 mg, Oral, Q1H PRN **OR** LORazepam (ATIVAN) injection 2 mg, 2 mg, Intravenous, Q1H PRN **OR** LORazepam (ATIVAN) injection 2 mg, 2 mg, Intravenous, Q15 Min PRN **OR** LORazepam (ATIVAN) injection 2 mg, 2 mg, Intramuscular, Q15 Min PRN **OR** LORazepam (ATIVAN) tablet 4 mg, 4 mg, Oral, Q1H PRN **OR** LORazepam (ATIVAN) injection 4 mg, 4 mg, Intravenous, Q1H PRN, Iker Parker MD  •  nicotine (NICODERM CQ) 14 MG/24HR patch 1 patch, 1 patch, Transdermal, Q24H, Iker Parker MD, 1 patch at 09/08/20 0811  •  potassium chloride (MICRO-K) CR capsule 40 mEq, 40 mEq, Oral, PRN, Shira Dunn MD  •  sodium chloride 0.9 % flush 10 mL, 10 mL, Intravenous, PRN, Vic Rodriguez MD  •  sodium chloride 0.9 % flush 10 mL, 10 mL, Intravenous, Q12H, Urbano Osborn MD, 10 mL at 09/10/20 0819  •  sodium chloride 0.9 % flush 10 mL, 10 mL, Intravenous, PRNIrena Stuart S, MD  •  sodium chloride 0.9 % flush 10 mL, 10 mL, Intravenous, PRN, Veronica Gonzalez MD  •  sodium chloride 0.9 % flush 3 mL, 3 mL, Intravenous, Q12H, Veronica Gonzalez MD  •  [START ON 9/11/2020] thiamine (VITAMIN B-1) tablet 100 mg, 100 mg, Oral, Daily, Shira Dunn MD    Results Review:  I have reviewed the labs, radiology results, and diagnostic studies.    Laboratory Data:   Results from last 7 days   Lab Units 09/10/20  0326 09/09/20  0531 09/08/20  0504   SODIUM mmol/L 140 139 140   POTASSIUM mmol/L 3.4* 3.6 3.8   CHLORIDE mmol/L 99 103 106   CO2 mmol/L 29.0 26.0 27.0   BUN mg/dL 21* 21* 17   CREATININE mg/dL 0.85 0.72* 0.83   GLUCOSE mg/dL 103* 106* 119*   CALCIUM mg/dL 8.9 8.7 8.3*   BILIRUBIN mg/dL 0.7 1.0 1.0   ALK PHOS U/L 69 70 67   ALT (SGPT) U/L 43* 33 21   AST (SGOT)  U/L 33 32 24   ANION GAP mmol/L 12.0 10.0 7.0     Estimated Creatinine Clearance: 109.9 mL/min (by C-G formula based on SCr of 0.85 mg/dL).  Results from last 7 days   Lab Units 09/10/20  0326 09/09/20  0531 09/08/20  0504   MAGNESIUM mg/dL 2.2 2.1 2.1   PHOSPHORUS mg/dL 5.1* 3.7 4.1         Results from last 7 days   Lab Units 09/10/20  0931 09/10/20  0326 09/09/20  0531 09/08/20  0504 09/07/20  0322   WBC 10*3/mm3 8.61 8.16 8.81 9.22 9.98   HEMOGLOBIN g/dL 16.1 14.8 15.1 14.2 13.2   HEMATOCRIT % 45.8 42.4 43.6 41.0 38.2   PLATELETS 10*3/mm3 188 171 182 174 158           Culture Data:   No results found for: BLOODCX  No results found for: URINECX  No results found for: RESPCX  No results found for: WOUNDCX  No results found for: STOOLCX  No components found for: BODYFLD    Radiology Data:   Imaging Results (Last 24 Hours)     ** No results found for the last 24 hours. **          ABG:  Results from last 7 days   Lab Units 09/08/20  1030   PH, ARTERIAL pH units 7.473*   PO2 ART mm Hg 75.1*   PCO2, ARTERIAL mm Hg 36.4   HCO3 ART mmol/L 26.6*       I have reviewed the patient's current medications.     Assessment/Plan     Hospital Problem List:  Principal Problem:    Acute respiratory failure with hypoxia (CMS/[facility])  Active Problems:    Aortic stenosis, severe    Acute systolic CHF (congestive heart failure) (CMS/[facility])    Acute respiratory failure with hypoxia (secondary to bilateral infiltrate/acute systolic heart failure): Patient is status post extubation.  Continue noninvasive respiratory therapy, antimicrobial therapy and bronchodilators.    Acute systolic congestive heart failure (likely due to severe aortic stenosis): Continue diuretics with strict I's and O's, daily weights, salt and fluid restriction.    · Cath planned for today.   · Echocardiogram done 9/5/2020      Sepsis secondary to bilateral pneumonia: Patient did screen positive for sepsis.  Continue IV antibiotics.  Blood culture showed coag negative  Staphylococcus aureus likely due to contaminant and respiratory culture showed mixed bacterial herlinda.  Reactive leukocytosis has resolved.    Hypokalemia: monitor and replace    Nicotine dependence: Continue nicotine patch.    History of alcoholism: Patient states he quit drinking alcohol more than a year ago.    Nutrition: Perform bedside swallowing assessment and begin oral intake if successful.    Continue GI and DVT prophylaxis.    Discharge Planning: In progress.    Shira Dunn MD   09/10/20   09:44

## 2020-09-10 NOTE — PAYOR COMM NOTE
"Mark Sharma (59 y.o. Male)     Date of Birth Social Security Number Address Home Phone MRN    1960  [address]  [facility] [phone] [id number]    Sabianism Marital Status          None Single       Admission Date Admission Type Admitting Provider Attending Provider Department, Room/Bed    9/5/20 Emergency Madi Griffith MD Gerlach, Elizabeth T, MD [facility] CATH LAB, Pool/NONE    Discharge Date Discharge Disposition Discharge Destination                       Attending Provider:  Shira Dunn MD    Allergies:  Metoprolol    Isolation:  None   Infection:  None   Code Status:  CPR    Ht:  182.9 cm (72.01\")   Wt:  83 kg (182 lb 15.7 oz)    Admission Cmt:  None   Principal Problem:  Acute respiratory failure with hypoxia (CMS/[facility]) [J96.01]                 Active Insurance as of 9/5/2020     Primary Coverage     Payor Plan Insurance Group Employer/Plan Group    [employer]HEM BLUE CROSS AN[employer]M BLUE [employer]OSS BLUE [employer]IELD PPO [id number]     Payor Plan Address Payor Plan Phone Number Payor Plan Fax Number Effective Dates    [address] [phone]  8/1/2019 - None Entered    [place] [address]       Subscriber Name Subscriber Birth Date Member ID       TAMIBUCKYMARK NG 1960 [id number]                 Emergency Contacts      (Rel.) Home Phone Work Phone Mobile Phone    Kristy Harris (Friend) [phone] -- [phone]    Ciaran Sharma (Brother) [phone] -- [phone]    Kamran Sharma (Son) [phone] -- --        Case-[id number]    Insurance Information                [employer]HEM BLUE CROSS/[employer]HEM BLUE CROSS BLUE [employer] PPO Phone: [phone]    Subscriber: Mark Sharma Subscriber#: [id number]    Group#: [id number] Precert#:           Vital Signs (last day)     Date/Time   Temp   Temp src   Pulse   Resp   BP   Patient Position   SpO2    09/10/20 15:28:10   --   --   --   --   --   --   98    " 09/10/20 1214   --   --   83   16   --   --   98    09/10/20 1208   --   --   79   16   120/75   --   97    09/10/20 0818   --   --   --   --   118/74   --   --    09/10/20 0800   98.4 (36.9)   Oral   83   15   118/74   --   95    09/10/20 0753   --   --   78   16   --   --   100    09/10/20 0745   --   --   76   18   --   --   96    09/10/20 0400   98.1 (36.7)   Oral   76   16   120/76   Lying   97    09/10/20 0000   97.8 (36.6)   Axillary   71   16   105/69   Lying   96    09/09/20 2000   98.4 (36.9)   Oral   87   20   109/73   Lying   95    09/09/20 1939   --   --   84   20   --   --   94    09/09/20 1830   --   --   81   --   116/73   --   97    09/09/20 1800   --   --   80   --   --   --   95    09/09/20 1600   98 (36.7)   Oral   90   18   118/81   Lying   96    09/09/20 1402   --   --   77   18   --   --   96    09/09/20 1300   --   --   78   --   114/70   --   96    09/09/20 1200   98.1 (36.7)   Oral   76   16   102/72   Lying   95    09/09/20 1100   --   --   79   --   108/75   --   96    09/09/20 1000   --   --   90   --   113/74   --   94    09/09/20 0900   --   --   102   --   120/83   --   94    09/09/20 0800   98 (36.7)   Oral   97   18   131/87   Lying   95    09/09/20 0746   --   --   85   15   --   --   95    09/09/20 0700   --   --   80   --   125/90   --   93    09/09/20 0600   --   --   77   --   118/74   --   93    09/09/20 0500   --   --   76   --   121/78   --   94    09/09/20 0400   98.1 (36.7)   Oral   73   --   124/77   --   93    09/09/20 0300   --   --   77   --   118/80   --   95    09/09/20 0200   --   --   73   --   118/80   --   94    09/09/20 0100   --   --   87   --   119/81   --   96    09/09/20 0000   98.3 (36.8)   Oral   73   --   120/72   --   93                Current Facility-Administered Medications   Medication Dose Route Frequency Provider Last Rate Last Dose   • bisoprolol (ZEBeta) half tablet 2.5 mg  2.5 mg Oral Q24H Veronica Gonzalez MD   2.5 mg at 09/10/20 0818   •  bivalirudin (ANGIOMAX) bolus from bag    PRN Veronica Gonzalez MD   62.25 mg at 09/10/20 1558   • Bivalirudin Trifluoroacetate (ANGIOMAX) 250 mg in sodium chloride 0.9 % 50 mL (5 mg/mL) infusion    Continuous PRN Veronica Gonzalez MD 29.1 mL/hr at 09/10/20 1558 1.75 mg/kg/hr at 09/10/20 1558   • [MAR Hold] budesonide-formoterol (SYMBICORT) 160-4.5 MCG/ACT inhaler 2 puff  2 puff Inhalation BID - RT Iker Parker MD   2 puff at 09/10/20 0745   • cefTRIAXone (ROCEPHIN) 1 g/100 mL 0.9% NS (MBP)  1 g Intravenous Q24H Iker Parker MD   1 g at 09/09/20 1323   • famotidine (PEPCID) injection 20 mg  20 mg Intravenous Q12H Madi Griffith MD   20 mg at 09/10/20 0819   • [MAR Hold] fentaNYL citrate (PF) (SUBLIMAZE) injection 25 mcg  25 mcg Intravenous Q2H PRN Iker Parker MD       • fentaNYL citrate (PF) (SUBLIMAZE) injection    PRN Veronica Gonzalez MD   25 mcg at 09/10/20 1546   • [MAR Hold] furosemide (LASIX) tablet 40 mg  40 mg Oral BID Veronica Gonzalez MD       • heparin 1000 units in sodium chloride 0.9% IV infusion    PRN Veronica Gonzalez MD   1,000 mL at 09/10/20 1536   • heparin 5000 units in sodium chloride 0.9% IV infusion    PRN Veronica Gonzalez MD   500 mL at 09/10/20 1536   • [MAR Hold] ipratropium-albuterol (DUO-NEB) nebulizer solution 3 mL  3 mL Nebulization 4x Daily - RT Iker Parker MD   3 mL at 09/10/20 1208   • lidocaine (XYLOCAINE) 2% injection    PRN Veronica Gonzalez MD   20 mL at 09/10/20 1546   • [MAR Hold] LORazepam (ATIVAN) tablet 1 mg  1 mg Oral Q2H PRN Iker Parker MD        Or   • [MAR Hold] LORazepam (ATIVAN) injection 1 mg  1 mg Intravenous Q2H PRN Iker Parker MD        Or   • [MAR Hold] LORazepam (ATIVAN) tablet 2 mg  2 mg Oral Q1H PRN Iker Parker MD        Or   • [MAR Hold] LORazepam (ATIVAN) injection 2 mg  2 mg Intravenous Q1H PRN Iker Parker MD        Or   • [MAR Hold] LORazepam (ATIVAN)  injection 2 mg  2 mg Intravenous Q15 Min PRN Iker Parker MD        Or   • [MAR Hold] LORazepam (ATIVAN) injection 2 mg  2 mg Intramuscular Q15 Min PRN Iker Parker MD        Or   • [MAR Hold] LORazepam (ATIVAN) tablet 4 mg  4 mg Oral Q1H PRN Iker Parker MD        Or   • [MAR Hold] LORazepam (ATIVAN) injection 4 mg  4 mg Intravenous Q1H PRN Iker Parker MD       • midazolam (VERSED) injection    PRN Veronica Gonzalez MD   1 mg at 09/10/20 1546   • [MAR Hold] nicotine (NICODERM CQ) 14 MG/24HR patch 1 patch  1 patch Transdermal Q24H Iker Parker MD   1 patch at 09/08/20 0811   • potassium chloride (MICRO-K) CR capsule 40 mEq  40 mEq Oral PRN Shira Dunn MD   40 mEq at 09/10/20 1111   • [MAR Hold] sodium chloride 0.9 % flush 10 mL  10 mL Intravenous PRN Vic Rodriguez MD       • [MAR Hold] sodium chloride 0.9 % flush 10 mL  10 mL Intravenous Q12H Urbano Osborn MD   10 mL at 09/10/20 0819   • [MAR Hold] sodium chloride 0.9 % flush 10 mL  10 mL Intravenous PRN Urbano Osborn MD       • sodium chloride 0.9 % flush 10 mL  10 mL Intravenous PRN Veronica Gonzalez MD       • sodium chloride 0.9 % flush 3 mL  3 mL Intravenous Q12H Veronica Gonzalez MD   3 mL at 09/10/20 1057   • sodium chloride 0.9 % infusion  - ADS Override Pull            • [MAR Hold] thiamine (VITAMIN B-1) tablet 100 mg  100 mg Oral Daily Shira Dunn MD            Physician Progress Notes (last 24 hours) (Notes from 09/09/20 1611 through 09/10/20 1611)      Shira Dunn MD at 09/10/20 0939              [facility] Medicine Services  INPATIENT PROGRESS NOTE    Length of Stay: 5  Date of Admission: 9/5/2020  Primary Care Physician: Tyler Singh MD    Subjective   Chief Complaint: Respiratory failure.    HPI: Patient is seen for follow-up. No new complaints. Plan for cardiac cath this afternoon. No shortness of breath. No chest  pain.     Review of Systems   Constitutional: Negative for activity change, appetite change, chills, diaphoresis, fatigue and fever.   HENT: Negative for trouble swallowing and voice change.    Eyes: Negative for photophobia and visual disturbance.   Respiratory: Negative for cough, choking, chest tightness, shortness of breath, wheezing and stridor.    Cardiovascular: Negative for chest pain, palpitations and leg swelling.   Gastrointestinal: Negative for abdominal distention, abdominal pain, blood in stool, constipation, diarrhea, nausea and vomiting.   Endocrine: Negative for cold intolerance, heat intolerance, polydipsia, polyphagia and polyuria.   Genitourinary: Negative for decreased urine volume, difficulty urinating, dysuria, enuresis, flank pain, frequency, hematuria and urgency.   Musculoskeletal: Negative for arthralgias, gait problem, myalgias, neck pain and neck stiffness.   Skin: Negative for pallor, rash and wound.   Neurological: Negative for dizziness, tremors, seizures, syncope, facial asymmetry, speech difficulty, weakness, light-headedness, numbness and headaches.   Hematological: Does not bruise/bleed easily.   Psychiatric/Behavioral: Negative for agitation, behavioral problems and confusion.       Objective    Temp:  [97.8 °F (36.6 °C)-98.4 °F (36.9 °C)] 98.4 °F (36.9 °C)  Heart Rate:  [71-90] 83  Resp:  [15-20] 15  BP: (102-120)/(69-81) 118/74      Physical Exam   Constitutional: He is oriented to person, place, and time. He appears well-developed and well-nourished. No distress.   Sitting up in chair   HENT:   Head: Normocephalic and atraumatic.   Eyes: Pupils are equal, round, and reactive to light. EOM are normal. No scleral icterus.   Neck: Normal range of motion. Neck supple. No JVD present. No thyromegaly present.   Cardiovascular: Normal rate and regular rhythm. Exam reveals no gallop and no friction rub.   Murmur (systolic) heard.  Pulmonary/Chest: Effort normal and breath sounds  normal. He has no decreased breath sounds. He has no wheezes. He has no rales. He exhibits no tenderness.   Abdominal: Soft. Bowel sounds are normal. He exhibits no distension and no mass. There is no tenderness. There is no rebound and no guarding.   Musculoskeletal: He exhibits no edema, tenderness or deformity.   Neurological: He is alert and oriented to person, place, and time. No cranial nerve deficit or sensory deficit. He exhibits normal muscle tone. Coordination normal.   Skin: Skin is warm and dry. No rash noted. He is not diaphoretic. No erythema. No pallor.   Psychiatric: He has a normal mood and affect. His behavior is normal. Judgment and thought content normal.   Nursing note and vitals reviewed.        Medication Review:    Current Facility-Administered Medications:   •  bisoprolol (ZEBeta) half tablet 2.5 mg, 2.5 mg, Oral, Q24H, Veronica Gonzalez MD, 2.5 mg at 09/10/20 0818  •  budesonide-formoterol (SYMBICORT) 160-4.5 MCG/ACT inhaler 2 puff, 2 puff, Inhalation, BID - RT, Iker Parker MD, 2 puff at 09/10/20 0745  •  cefTRIAXone (ROCEPHIN) 1 g/100 mL 0.9% NS (MBP), 1 g, Intravenous, Q24H, Iker Parker MD, 1 g at 09/09/20 1323  •  famotidine (PEPCID) injection 20 mg, 20 mg, Intravenous, Q12H, Madi Griffith MD, 20 mg at 09/10/20 0819  •  fentaNYL citrate (PF) (SUBLIMAZE) injection 25 mcg, 25 mcg, Intravenous, Q2H PRN, Iker Parker MD  •  furosemide (LASIX) tablet 40 mg, 40 mg, Oral, BID, Veronica Gonzalez MD  •  ipratropium-albuterol (DUO-NEB) nebulizer solution 3 mL, 3 mL, Nebulization, 4x Daily - RT, Iker Parker MD, 3 mL at 09/10/20 0745  •  LORazepam (ATIVAN) tablet 1 mg, 1 mg, Oral, Q2H PRN **OR** LORazepam (ATIVAN) injection 1 mg, 1 mg, Intravenous, Q2H PRN **OR** LORazepam (ATIVAN) tablet 2 mg, 2 mg, Oral, Q1H PRN **OR** LORazepam (ATIVAN) injection 2 mg, 2 mg, Intravenous, Q1H PRN **OR** LORazepam (ATIVAN) injection 2 mg, 2 mg,  Intravenous, Q15 Min PRN **OR** LORazepam (ATIVAN) injection 2 mg, 2 mg, Intramuscular, Q15 Min PRN **OR** LORazepam (ATIVAN) tablet 4 mg, 4 mg, Oral, Q1H PRN **OR** LORazepam (ATIVAN) injection 4 mg, 4 mg, Intravenous, Q1H PRN, Iker Parker MD  •  nicotine (NICODERM CQ) 14 MG/24HR patch 1 patch, 1 patch, Transdermal, Q24H, Ikre Parker MD, 1 patch at 09/08/20 0811  •  potassium chloride (MICRO-K) CR capsule 40 mEq, 40 mEq, Oral, PRN, Shira Dunn MD  •  sodium chloride 0.9 % flush 10 mL, 10 mL, Intravenous, PRN, OzorVic MD  •  sodium chloride 0.9 % flush 10 mL, 10 mL, Intravenous, Q12H, Urbano Osborn MD, 10 mL at 09/10/20 0819  •  sodium chloride 0.9 % flush 10 mL, 10 mL, Intravenous, PRN, Urbano Osborn MD  •  sodium chloride 0.9 % flush 10 mL, 10 mL, Intravenous, PRN, Veronica Gonzalez MD  •  sodium chloride 0.9 % flush 3 mL, 3 mL, Intravenous, Q12H, Veronica Gonzalez MD  •  [START ON 9/11/2020] thiamine (VITAMIN B-1) tablet 100 mg, 100 mg, Oral, Daily, Shira Dunn MD    Results Review:  I have reviewed the labs, radiology results, and diagnostic studies.    Laboratory Data:   Results from last 7 days   Lab Units 09/10/20  0326 09/09/20  0531 09/08/20  0504   SODIUM mmol/L 140 139 140   POTASSIUM mmol/L 3.4* 3.6 3.8   CHLORIDE mmol/L 99 103 106   CO2 mmol/L 29.0 26.0 27.0   BUN mg/dL 21* 21* 17   CREATININE mg/dL 0.85 0.72* 0.83   GLUCOSE mg/dL 103* 106* 119*   CALCIUM mg/dL 8.9 8.7 8.3*   BILIRUBIN mg/dL 0.7 1.0 1.0   ALK PHOS U/L 69 70 67   ALT (SGPT) U/L 43* 33 21   AST (SGOT) U/L 33 32 24   ANION GAP mmol/L 12.0 10.0 7.0     Estimated Creatinine Clearance: 109.9 mL/min (by C-G formula based on SCr of 0.85 mg/dL).  Results from last 7 days   Lab Units 09/10/20  0326 09/09/20  0531 09/08/20  0504   MAGNESIUM mg/dL 2.2 2.1 2.1   PHOSPHORUS mg/dL 5.1* 3.7 4.1         Results from last 7 days   Lab Units 09/10/20  0931 09/10/20  0326 09/09/20  0531  09/08/20  0504 09/07/20  0322   WBC 10*3/mm3 8.61 8.16 8.81 9.22 9.98   HEMOGLOBIN g/dL 16.1 14.8 15.1 14.2 13.2   HEMATOCRIT % 45.8 42.4 43.6 41.0 38.2   PLATELETS 10*3/mm3 188 171 182 174 158           Culture Data:   No results found for: BLOODCX  No results found for: URINECX  No results found for: RESPCX  No results found for: WOUNDCX  No results found for: STOOLCX  No components found for: BODYFLD    Radiology Data:   Imaging Results (Last 24 Hours)     ** No results found for the last 24 hours. **          ABG:  Results from last 7 days   Lab Units 09/08/20  1030   PH, ARTERIAL pH units 7.473*   PO2 ART mm Hg 75.1*   PCO2, ARTERIAL mm Hg 36.4   HCO3 ART mmol/L 26.6*       I have reviewed the patient's current medications.     Assessment/Plan     Hospital Problem List:  Principal Problem:    Acute respiratory failure with hypoxia (CMS/[facility])  Active Problems:    Aortic stenosis, severe    Acute systolic CHF (congestive heart failure) (CMS/[facility])    Acute respiratory failure with hypoxia (secondary to bilateral infiltrate/acute systolic heart failure): Patient is status post extubation.  Continue noninvasive respiratory therapy, antimicrobial therapy and bronchodilators.    Acute systolic congestive heart failure (likely due to severe aortic stenosis): Continue diuretics with strict I's and O's, daily weights, salt and fluid restriction.    · Cath planned for today.   · Echocardiogram done 9/5/2020      Sepsis secondary to bilateral pneumonia: Patient did screen positive for sepsis.  Continue IV antibiotics.  Blood culture showed coag negative Staphylococcus aureus likely due to contaminant and respiratory culture showed mixed bacterial herlinda.  Reactive leukocytosis has resolved.    Hypokalemia: monitor and replace    Nicotine dependence: Continue nicotine patch.    History of alcoholism: Patient states he quit drinking alcohol more than a year ago.    Nutrition: Perform bedside swallowing assessment and begin oral  intake if successful.    Continue GI and DVT prophylaxis.    Discharge Planning: In progress.    Shira Dunn MD   09/10/20   09:44        Electronically signed by Shira Dunn MD at 09/10/20 0944     Veronica Gonzalez MD at 09/10/20 0849            [facility] Cardiology  INPATIENT PROGRESS NOTE    Name: Mark Sharma  Age/Sex: 59 y.o. male  :  1960        PCP: Tyler Singh MD    Vital Signs  Temp:  [97.8 °F (36.6 °C)-98.4 °F (36.9 °C)] 98.4 °F (36.9 °C)  Heart Rate:  [] 83  Resp:  [15-20] 15  BP: (102-120)/(69-83) 118/74  Body mass index is 24.81 kg/m².     Subjective   Extubated. Awake and alert  Patient Active Problem List   Diagnosis   • Acute respiratory failure with hypoxia (CMS/HCC)   • Aortic stenosis, severe   • Acute systolic CHF (congestive heart failure) (CMS/HCC)       Past Medical History:   Diagnosis Date   • Hypertension        Current Facility-Administered Medications   Medication Dose Route Frequency Provider Last Rate Last Dose   • bisoprolol (ZEBeta) half tablet 2.5 mg  2.5 mg Oral Q24H Veronica Gonzalez MD   2.5 mg at 09/10/20 0818   • budesonide-formoterol (SYMBICORT) 160-4.5 MCG/ACT inhaler 2 puff  2 puff Inhalation BID - RT Iker Parker MD   2 puff at 09/10/20 0745   • cefTRIAXone (ROCEPHIN) 1 g/100 mL 0.9% NS (MBP)  1 g Intravenous Q24H Iker Parker MD   1 g at 20 1323   • famotidine (PEPCID) injection 20 mg  20 mg Intravenous Q12H Madi Griffith MD   20 mg at 09/10/20 0819   • fentaNYL citrate (PF) (SUBLIMAZE) injection 25 mcg  25 mcg Intravenous Q2H PRN Iker Parker MD       • furosemide (LASIX) injection 40 mg  40 mg Intravenous Q12H Veronica Gonzalez MD   40 mg at 09/10/20 0818   • ipratropium-albuterol (DUO-NEB) nebulizer solution 3 mL  3 mL Nebulization 4x Daily - RT Iker Parker MD   3 mL at 09/10/20 0745   • LORazepam (ATIVAN) tablet 1 mg  1 mg Oral Q2H PRN  Iker Parker MD        Or   • LORazepam (ATIVAN) injection 1 mg  1 mg Intravenous Q2H PRN Iker Parker MD        Or   • LORazepam (ATIVAN) tablet 2 mg  2 mg Oral Q1H PRN Iker Parker MD        Or   • LORazepam (ATIVAN) injection 2 mg  2 mg Intravenous Q1H PRN Iker Parker MD        Or   • LORazepam (ATIVAN) injection 2 mg  2 mg Intravenous Q15 Min PRN Iker Pakrer MD        Or   • LORazepam (ATIVAN) injection 2 mg  2 mg Intramuscular Q15 Min PRN Iker Parker MD        Or   • LORazepam (ATIVAN) tablet 4 mg  4 mg Oral Q1H PRN Iker Parker MD        Or   • LORazepam (ATIVAN) injection 4 mg  4 mg Intravenous Q1H PRN Iker Parker MD       • nicotine (NICODERM CQ) 14 MG/24HR patch 1 patch  1 patch Transdermal Q24H Iker Parker MD   1 patch at 09/08/20 0811   • sodium chloride 0.9 % flush 10 mL  10 mL Intravenous PRN Vic Rodriguez MD       • sodium chloride 0.9 % flush 10 mL  10 mL Intravenous Q12H Urbano Osborn MD   10 mL at 09/10/20 0819   • sodium chloride 0.9 % flush 10 mL  10 mL Intravenous PRN Urbano Osborn MD       • thiamine (B-1) 100 mg in sodium chloride 0.9 % 100 mL IVPB  100 mg Intravenous Daily Iker Parker  mL/hr at 09/10/20 0818 100 mg at 09/10/20 0818       Past Surgical History:   Procedure Laterality Date   • APPENDECTOMY     • COLONOSCOPY N/A 12/20/2019    Procedure: COLONOSCOPY;  Surgeon: Dion Ambrocio DO;  [facility]: [facility] ENDOSCOPY;  Service: Gastroenterology       Social History     Socioeconomic History   • Marital status: Single     Spouse name: Not on file   • Number of children: Not on file   • Years of education: Not on file   • Highest education level: Not on file   Tobacco Use   • Smoking status: Former Smoker     Years: 2.00     Types: Cigars   • Smokeless tobacco: Never Used   Substance and Sexual Activity   • Alcohol use: Yes     Alcohol/week: 2.0 standard drinks     Types: 2 Cans of beer per  week     Comment: 2 beer/wk maybe 6-8 on wknd   • Drug use: Not Currently   • Sexual activity: Defer       O/E    Neck: Supple.  No JVD, no thyroid enlargement.  Chest: Air entry equal, normal respiration.  No rhonchi or creps.  Cardiovascular system:  Regular rate and rhythm, 2/6 murmurs.  Abdomen: Soft, no tenderness, bowel sounds present, no hepatosplenomegaly.  CNS: Alert, oriented to place and time.  No motor or sensory deficit.  Cranial nerves intact.  Musculoskeletal: No deformity of the back or spine.  Extremities:  No edema.  Pulses equal on both sides.    Lab Results (last 24 hours)     Procedure Component Value Units Date/Time    CBC & Differential [[id number]] Collected:  09/10/20 0326    Specimen:  Blood Updated:  09/10/20 0343    Narrative:       The following orders were created for panel order CBC & Differential.  Procedure                               Abnormality         Status                     ---------                               -----------         ------                     CBC Auto Differential[[id number]]        Abnormal            Final result                 Please view results for these tests on the individual orders.    Comprehensive Metabolic Panel [[id number]]  (Abnormal) Collected:  09/10/20 0326    Specimen:  Blood Updated:  09/10/20 0417     Glucose 103 mg/dL      BUN 21 mg/dL      Creatinine 0.85 mg/dL      Sodium 140 mmol/L      Potassium 3.4 mmol/L      Chloride 99 mmol/L      CO2 29.0 mmol/L      Calcium 8.9 mg/dL      Total Protein 6.0 g/dL      Albumin 3.60 g/dL      ALT (SGPT) 43 U/L      AST (SGOT) 33 U/L      Alkaline Phosphatase 69 U/L      Total Bilirubin 0.7 mg/dL      eGFR Non African Amer 92 mL/min/1.73      Globulin 2.4 gm/dL      A/G Ratio 1.5 g/dL      BUN/Creatinine Ratio 24.7     Anion Gap 12.0 mmol/L     Narrative:       GFR Normal >60  Chronic Kidney Disease <60  Kidney Failure <15      Magnesium [[id number]]  (Normal) Collected:  09/10/20 0326    Specimen:   Blood Updated:  09/10/20 0417     Magnesium 2.2 mg/dL     Phosphorus [[id number]]  (Abnormal) Collected:  09/10/20 0326    Specimen:  Blood Updated:  09/10/20 0421     Phosphorus 5.1 mg/dL     CBC Auto Differential [[id number]]  (Abnormal) Collected:  09/10/20 0326    Specimen:  Blood Updated:  09/10/20 0343     WBC 8.16 10*3/mm3      RBC 4.51 10*6/mm3      Hemoglobin 14.8 g/dL      Hematocrit 42.4 %      MCV 94.0 fL      MCH 32.8 pg      MCHC 34.9 g/dL      RDW 12.1 %      RDW-SD 42.2 fl      MPV 11.0 fL      Platelets 171 10*3/mm3      Neutrophil % 64.0 %      Lymphocyte % 19.5 %      Monocyte % 9.4 %      Eosinophil % 6.5 %      Basophil % 0.4 %      Immature Grans % 0.2 %      Neutrophils, Absolute 5.22 10*3/mm3      Lymphocytes, Absolute 1.59 10*3/mm3      Monocytes, Absolute 0.77 10*3/mm3      Eosinophils, Absolute 0.53 10*3/mm3      Basophils, Absolute 0.03 10*3/mm3      Immature Grans, Absolute 0.02 10*3/mm3      nRBC 0.0 /100 WBC           A/P  As  lv failure secondary to AS  Plan cath today      The indications, risks and benefits of diagnostic left heart cardiac catheterization, angiography, conscious sedation, and possible blood transfusion were discussed in detail with the patient. The potential complications of 1/2000 chance of death, 1/1000 chance of heart attack or stroke, 1/500 chance of bleeding or clotting of the femoral artery, and 1/500 chance of allergic reaction to contrast were discussed. We also reviewed possible complications of infection and kidney dysfunction. If PCI were performed and intra-coronary stents indicated, we discussed the details about  ANIL. This included a review of the risks of the infrequent, but relatively higher incidence of late thrombosis with ANIL. The importance of maintaining a consistent daily regimen of aspirin and an additional anti-platelet agent  for as long as directed after implantation was emphasized. No contraindications were found.  The patient  appeared to  understand and agree to the above.     ASA1 MAL2        This document has been electronically signed by Veronica Gonzalez MD on September 10, 2020 08:50         Part of this note may be an electronic transcription/translation of spoken language to printed text using the Dragon Dictation System.    Electronically signed by Veronica Gonzalez MD at 09/10/20 0854       Consult Notes (last 24 hours) (Notes from 09/09/20 1611 through 09/10/20 1611)    No notes of this type exist for this encounter.

## 2020-09-10 NOTE — CONSULTS
Adult Nutrition  Assessment    Patient Name:  Mark Sharma  YOB: 1960  MRN: [id number]  Admit Date:  9/5/2020    Assessment Date:  9/10/2020    Comments:  Pt is tolerating po diet with intake averaging ~75%.  He was evaluated by SLP and is currently of soft texture with chopped meats. He is currently NPO awaiting heart cath.  WT is down and pt reports a wt loss since admit but I suspect that most of this is related to diuresis with dx of acute CHF.  Wt is down approximately  18#.  Pt meets criteria for malnutrition.  Per Nutrition Focused physical assessment pt with fat loss and muscle wasting present.  Hx of Alcohol use, pt on thiamine.  RD will continue to monitor and add supplements for optimal intake.      Reason for Assessment     Row Name 09/10/20 1159          Reason for Assessment    Reason For Assessment  follow-up protocol         Nutrition/Diet History     Row Name 09/10/20 115          Nutrition/Diet History    Typical Food/Fluid Intake  Pt sitting up in chair.  He reports that he is trying to eat well.  He agrees to Boost supplments.  REports that he has lost wt since hospital admit.             Labs/Tests/Procedures/Meds     Row Name 09/10/20 1156          Labs/Procedures/Meds    Lab Results Reviewed  reviewed, pertinent     Lab Results Comments  Nun 25        Diagnostic Tests/Procedures    Diagnostic Test/Procedure Reviewed  reviewed, pertinent     Diagnostic Test/Procedures Comments  Heart Cath scheuled for today;         Medications    Pertinent Medications Reviewed  reviewed, pertinent     Pertinent Medications Comments  Abx; Lasix; Thiamine         Physical Findings     Row Name 09/10/20 115          Physical Findings    Overall Physical Appearance  loss of muscle mass;loss of subcutaneous fat;on oxygen therapy           Nutrition Prescription Ordered     Row Name 09/10/20 1158          Nutrition Prescription PO    Current PO Diet  NPO;Other (comment) But has been on soft  texture with chopped meats     Fluid Consistency  Thin         Evaluation of Received Nutrient/Fluid Intake     Row Name 09/10/20 1157          PO Evaluation    Number of Days PO Intake Evaluated  3 days     Number of Meals  3     % PO Intake  75%           Malnutrition Severity Assessment     Row Name 09/10/20 1207          Malnutrition Severity Assessment    Malnutrition Type  Chronic Disease - Related Malnutrition        Muscle Loss    Loss of Muscle Mass Findings  Severe     [place] Region  Severe - deep hollowing/scooping, lack of muscle to touch, facial bones well defined     Clavicle Bone Region  Severe - protruding prominent bone     Acromion Bone Region  Severe - squared shoulders, bones, and acromion process protrusion prominent     Scapular Bone Region  Severe - prominent bones, depressions easily visible between ribs, scapula, spine, shoulders        Fat Loss    Subcutaneous Fat Loss Findings  Severe     Orbital Region   Severe - pronounced hollowness/depression, dark circles, loose saggy skin     Thoracic & Lumbar Region  Moderate - ribs visible with mild depressions, iliac crest somewhat prominent        Fluid Accumulation (Edema)    Fluid Acumulation Findings  Severe        Criteria Met (Must meet criteria for severity in at least 2 of these categories: M Wasting, Fat Loss, Fluid, Secondary Signs, Wt. Status, Intake)    Patient meets criteria for   Severe Malnutrition           Electronically signed by:  Izabella Mathews RD  09/10/20 16:37

## 2020-09-10 NOTE — CONSULTS
Adult Nutrition  Assessment    Patient Name:  Mark Sharma  YOB: 1960  MRN: [id number]  Admit Date:  9/5/2020    Assessment Date:  9/10/2020    Comments:    Pt is tolerating po diet with intake averaging ~75%.  He was evaluated by SLP and is currently of soft texture with chopped meats. He is currently NPO awaiting heart cath.  WT is down and pt reports a wt loss since admit but I suspect that most of this is related to diuresis with dx of acute CHF.  Wt is down approximately  18#.  Pt meets criteria for malnutrition.  Per Nutrition Focused physical assessment pt with fat loss and muscle wasting present.  Hx of Alcohol use, pt on thiamine.  RD will continue to monitor and add supplements for optimal intake.      Reason for Assessment     Row Name 09/10/20 1156          Reason for Assessment    Reason For Assessment  follow-up protocol         Nutrition/Diet History     Row Name 09/10/20 1154          Nutrition/Diet History    Typical Food/Fluid Intake  Pt sitting up in chair.  He reports that he is trying to eat well.  He agrees to Boost supplments.  REports that he has lost wt since hospital admit.           Anthropometrics     Row Name 09/10/20 0455          Anthropometrics    Weight  83 kg (182 lb 15.7 oz)         Labs/Tests/Procedures/Meds     Row Name 09/10/20 2746          Labs/Procedures/Meds    Lab Results Reviewed  reviewed, pertinent     Lab Results Comments  Nun 25        Diagnostic Tests/Procedures    Diagnostic Test/Procedure Reviewed  reviewed, pertinent     Diagnostic Test/Procedures Comments  Heart Cath scheuled for today;         Medications    Pertinent Medications Reviewed  reviewed, pertinent     Pertinent Medications Comments  Abx; Lasix; Thiamine         Physical Findings     Row Name 09/10/20 7510          Physical Findings    Overall Physical Appearance  loss of muscle mass;loss of subcutaneous fat;on oxygen therapy           Nutrition Prescription Ordered     Row Name  09/10/20 1157          Nutrition Prescription PO    Current PO Diet  NPO;Other (comment) But has been on soft texture with chopped meats     Fluid Consistency  Thin         Evaluation of Received Nutrient/Fluid Intake     Row Name 09/10/20 1157          PO Evaluation    Number of Days PO Intake Evaluated  3 days     Number of Meals  3     % PO Intake  75%               Electronically signed by:  Izabella Mathews RD  09/10/20 12:03

## 2020-09-10 NOTE — PLAN OF CARE
Problem: Skin Injury Risk (Adult)  Intervention: Promote/Optimize Nutrition  Flowsheets (Taken 9/10/2020 1201)  Oral Nutrition Promotion: calorie dense foods provided;calorie dense liquids provided;nutritional therapy counseling provided     Problem: Patient Care Overview  Goal: Plan of Care Review  Outcome: Ongoing (interventions implemented as appropriate)  Flowsheets (Taken 9/10/2020 1201)  Progress: improving  Plan of Care Reviewed With: caregiver;patient  Outcome Summary: Pt tolerating po diet.  Intake is ~75% average.  Will add supplements and monitor.  Pt meets [facility] for malnutrition

## 2020-09-10 NOTE — SIGNIFICANT NOTE
09/10/20 1353   Rehab Treatment   Discipline physical therapy assistant   Reason Treatment Not Performed unavailable for treatment  (Nsg. present in room preparing to transfer down for Heart Cath. this p.m. Will check back tomorrow for treatment)

## 2020-09-10 NOTE — THERAPY TREATMENT NOTE
Acute Care - Occupational Therapy Treatment Note  [facility]     Patient Name: Mark Sharma  : 1960  MRN: [id number]  Today's Date: 9/10/2020  Onset of Illness/Injury or Date of Surgery: 20  Date of Referral to OT: 20  Referring Physician: CINDY Parker MD     Admit Date: 2020       ICD-10-CM ICD-9-CM   1. Acute respiratory failure with hypoxia (CMS/[facility]ly [facility]) J96.01 518.81   2. Pneumonia due to organism J18.9 486   3. COPD exacerbation (CMS/[facility]ly [facility]) J44.1 491.21   4. Dysphagia, unspecified type R13.10 787.20   5. Impaired functional mobility, balance, gait, and endurance Z74.09 V49.89   6. Impaired mobility and ADLs Z74.09 V49.89    Z78.9    7. Aortic stenosis, severe I35.0 424.1     Patient Active Problem List   Diagnosis   • Acute respiratory failure with hypoxia (CMS/[facility])   • Aortic stenosis, severe   • Acute systolic CHF (congestive heart failure) (CMS/Formerly [facility])     Past Medical History:   Diagnosis Date   • Hypertension      Past Surgical History:   Procedure Laterality Date   • APPENDECTOMY     • COLONOSCOPY N/A 2019    Procedure: COLONOSCOPY;  Surgeon: Dion Ambrocio DO;  Location: [facility] ENDOSCOPY;  Service: Gastroenterology       Therapy Treatment    Rehabilitation Treatment Summary     Row Name 09/10/20 0855             Treatment Time/Intention    Discipline  occupational therapy assistant  -CS      Document Type  therapy note (daily note)  -CS      Subjective Information  no complaints  -CS      Mode of Treatment  occupational therapy  -CS      Therapy Frequency (OT Eval)  other (see comments) 5-7 days per week   -CS      Patient Effort  excellent  -CS      Existing Precautions/Restrictions  fall  -CS      Recorded by [CS] Jacqueline Vale COTA/L 09/10/20 1151      Row Name 09/10/20 0855             Vital Signs    Pre Systolic BP Rehab  118  -CS      Pre Treatment Diastolic BP  74  -CS      Pretreatment Heart Rate (beats/min)  94  -CS      Posttreatment Heart Rate  (beats/min)  91  -CS      Pre SpO2 (%)  100  -CS      O2 Delivery Pre Treatment  room air  -CS      Post SpO2 (%)  96  -CS      O2 Delivery Post Treatment  room air  -CS      Pre Patient Position  Sitting  -CS      Intra Patient Position  Standing  -CS      Post Patient Position  Sitting  -CS      Recorded by [CS] Jacqueline Vale COTA/L 09/10/20 1151      Row Name 09/10/20 0855             Cognitive Assessment/Intervention- PT/OT    Affect/Mental Status (Cognitive)  WFL  -CS      Orientation Status (Cognition)  oriented x 4  -CS      Recorded by [CS] Jacqueline Vale COTA/L 09/10/20 1151      Row Name 09/10/20 0855             Functional Mobility    Functional Mobility- Ind. Level  supervision required  -CS      Functional Mobility- Device  other (see comments) none  -CS      Functional Mobility-Distance (Feet)  15  -CS      Functional Mobility- Comment  chair to bathroom sink  -CS      Recorded by [CS] Jacqueline Vale COTA/L 09/10/20 1151      Row Name 09/10/20 0855             Transfer Assessment/Treatment    Transfer Assessment/Treatment  sit-stand transfer;stand-sit transfer  -CS      Recorded by [CS] Jacqueline Vale COTA/L 09/10/20 1151      Row Name 09/10/20 0855             Sit-Stand Transfer    Sit-Stand [place] (Transfers)  supervision  -CS      Assistive Device (Sit-Stand Transfers)  -- none  -CS      Recorded by [CS] Jacqueline Vale COTA/L 09/10/20 1151      Row Name 09/10/20 0855             Stand-Sit Transfer    Stand-Sit [place] (Transfers)  supervision  -CS      Assistive Device (Stand-Sit Transfers)  -- none  -CS      Recorded by [CS] Jacqueline Vale COTA/L 09/10/20 1151      Row Name 09/10/20 0855             ADL Assessment/Intervention    BADL Assessment/Intervention  bathing;upper body dressing;lower body dressing;grooming  -CS      Recorded by [CS] Jacqueline Vale COTA/L 09/10/20 1151      Row Name 09/10/20 0855             Bathing Assessment/Intervention    Bathing  [place] Level  bathing skills;upper body;lower body;supervision  -CS      Assistive Devices (Bathing)  bath mitt  -CS      Bathing Position  supported sitting;unsupported standing  -CS      Recorded by [CS] Jacqueline Vale COTA/L 09/10/20 1151      Row Name 09/10/20 0855             Upper Body Dressing Assessment/Training    Upper Body Dressing [place] Level  doff;don;set up HG  -CS      Upper Body Dressing Position  supported sitting  -CS      Recorded by [CS] Jacqueline Vale COTA/L 09/10/20 1151      Row Name 09/10/20 0855             Lower Body Dressing Assessment/Training    Lower Body Dressing [place] Level  doff;don;socks;set up  -CS      Lower Body Dressing Position  supported sitting  -CS      Recorded by [CS] Jacqueline Vale COTA/L 09/10/20 1151      Row Name 09/10/20 0855             Grooming Assessment/Training    [place] Level (Grooming)  grooming skills;hair care, combing/brushing;oral care regimen;wash face, hands;supervision;set up apply deodorant  -CS      Grooming Position  supported sitting;unsupported standing;sink side  -CS      Recorded by [CS] Jacqueline Vale COTA/L 09/10/20 1151      Row Name 09/10/20 0855             Dynamic Standing Balance    Level of [place], Reaches Outside Midline (Standing, Dynamic Balance)  supervision  -CS      Time Able to Maintain Position, Reaches Outside Midline (Standing, Dynamic Balance)  more than 5 minutes  -CS      Assistive Device Utilized (Supported Standing, Dynamic Balance)  other (see comments) none  -CS      Recorded by [CS] Jacqueline Vale COTA/L 09/10/20 1151      Row Name 09/10/20 0855             Positioning and Restraints    Pre-Treatment Position  sitting in chair/recliner  -CS      Post Treatment Position  chair  -CS      In Chair  sitting;call light within reach;encouraged to call for assist  -CS      Recorded by [CS] Jacqueline Vale COTA/L 09/10/20 1151      Row Name 09/10/20 0855             Pain  Scale: Numbers Pre/Post-Treatment    Pain Scale: Numbers, Pretreatment  0/10 - no pain  -CS      Pain Scale: Numbers, Post-Treatment  0/10 - no pain  -CS      Recorded by [CS] Jacqueline Vale COTA/L 09/10/20 1151      Row Name 09/10/20 0855             Outcome Summary/Treatment Plan (OT)    Daily Summary of Progress (OT)  progress toward functional goals as expected  -CS      Plan for Continued Treatment (OT)  cont OT POC  -CS      Anticipated Discharge Disposition (OT)  home;home with assist  -CS      Recorded by [CS] Jacqueline Vale COTA/L 09/10/20 1151        User Key  (r) = Recorded By, (t) = Taken By, (c) = Cosigned By    Initials Name Effective Dates Discipline    CS Jacqueline Vale COTA/KELLY 03/07/18 -  OT           Rehab Goal Summary     Row Name 09/10/20 0855             Occupational Therapy Goals    Transfer Goal Selection (OT)  transfer, OT goal 1  -CS      Bathing Goal Selection (OT)  bathing, OT goal 1  -CS      Dressing Goal Selection (OT)  dressing, OT goal 1  -CS      Toileting Goal Selection (OT)  toileting, OT goal 1  -CS      Endurance Goal Selection (OT)  endurance, OT goal 1  -CS         Transfer Goal 1 (OT)    Activity/Assistive Device (Transfer Goal 1, OT)  toilet  -CS      [place] Level/Cues Needed (Transfer Goal 1, OT)  conditional independence  -CS      Time Frame (Transfer Goal 1, OT)  long term goal (LTG);by discharge  -CS      Progress/Outcome (Transfer Goal 1, OT)  goal not met  -CS         Bathing Goal 1 (OT)    Activity/Assistive Device (Bathing Goal 1, OT)  lower body bathing AD as needed   -CS      [place] Level/Cues Needed (Bathing Goal 1, OT)  conditional independence  -CS      Time Frame (Bathing Goal 1, OT)  long term goal (LTG);by discharge  -CS      Progress/Outcomes (Bathing Goal 1, OT)  goal not met;good progress toward goal  -CS         Dressing Goal 1 (OT)    Activity/Assistive Device (Dressing Goal 1, OT)  lower body dressing AD as needed   -CS       [place]/Cues Needed (Dressing Goal 1, OT)  conditional independence  -CS      Time Frame (Dressing Goal 1, OT)  long term goal (LTG);by discharge  -CS      Progress/Outcome (Dressing Goal 1, OT)  goal not met;good progress toward goal  -CS         Toileting Goal 1 (OT)    Activity/Device (Toileting Goal 1, OT)  toileting skills, all  -CS      [place] Level/Cues Needed (Toileting Goal 1, OT)  conditional independence  -CS      Time Frame (Toileting Goal 1, OT)  long term goal (LTG);by discharge  -CS      Progress/Outcome (Toileting Goal 1, OT)  goal not met  -CS          Endurance Goal 1 (OT)    Endurance Goal 1 (OT)  20 min functional activity with proper EC techniques   -CS      Time Frame (Endurance Goal 1, OT)  long term goal (LTG);by discharge  -CS      Progress/Outcome (Endurance Goal 1, OT)  goal not met  -CS        User Key  (r) = Recorded By, (t) = Taken By, (c) = Cosigned By    Initials Name Provider Type Discipline    CS Jacqueline Vale COTA/L Occupational Therapy Assistant OT        Occupational Therapy Education                 Title: PT OT SLP Therapies (In Progress)     Topic: Occupational Therapy (In Progress)     Point: ADL training (Done)     Description:   Instruct learner(s) on proper safety adaptation and remediation techniques during self care or transfers.   Instruct in proper use of assistive devices.              Learning Progress Summary           Patient Acceptance, E,TB,D, VU by CS at 9/10/2020 1153    Acceptance, E, VU,NR by ME at 9/9/2020 1014    Comment:  Educated on OT and POC. Educated to call for assistance. Educated on safety precautions. Educated on proper body mechanics for safe transfers, functional mobility, and ADLs.                   Point: Home exercise program (Not Started)     Description:   Instruct learner(s) on appropriate technique for monitoring, assisting and/or progressing therapeutic exercises/activities.              Learner Progress:   Not documented  in this visit.          Point: Precautions (Done)     Description:   Instruct learner(s) on prescribed precautions during self-care and functional transfers.              Learning Progress Summary           Patient Acceptance, E,TB,D, VU by  at 9/10/2020 1153    Acceptance, E, VU,NR by ME at 9/9/2020 1014    Comment:  Educated on OT and POC. Educated to call for assistance. Educated on safety precautions. Educated on proper body mechanics for safe transfers, functional mobility, and ADLs.                   Point: Body mechanics (Done)     Description:   Instruct learner(s) on proper positioning and spine alignment during self-care, functional mobility activities and/or exercises.              Learning Progress Summary           Patient Acceptance, E,TB,D, VU by  at 9/10/2020 1153    Acceptance, E, VU,NR by ME at 9/9/2020 1014    Comment:  Educated on OT and POC. Educated to call for assistance. Educated on safety precautions. Educated on proper body mechanics for safe transfers, functional mobility, and ADLs.                               User Key     Initials Effective Dates Name Provider Type Discipline     03/07/18 -  Jacqueline Vale COTA/KELLY Occupational Therapy Assistant OT    ME 08/24/20 -  Alexandr Steele OTR/L Occupational Therapist OT                OT Recommendation and Plan  Outcome Summary/Treatment Plan (OT)  Daily Summary of Progress (OT): progress toward functional goals as expected  Plan for Continued Treatment (OT): cont OT POC  Anticipated Discharge Disposition (OT): home, home with assist  Therapy Frequency (OT Eval): other (see comments)(5-7 days per week )  Daily Summary of Progress (OT): progress toward functional goals as expected  Outcome Summary: Pt tolerated tx well this date. Pt completed an ADL. Pt was SBA with all transfers and functional mobility. Continue OT POC.  Outcome Measures     Row Name 09/10/20 1100 09/09/20 1600 09/09/20 0902       How much help from another person do  you currently need...    Turning from your back to your side while in flat bed without using bedrails?  --  3  -TA  --    Moving from lying on back to sitting on the side of a flat bed without bedrails?  --  3  -TA  --    Moving to and from a bed to a chair (including a wheelchair)?  --  3  -TA  --    Standing up from a chair using your arms (e.g., wheelchair, bedside chair)?  --  3  -TA  --    Climbing 3-5 steps with a railing?  --  3  -TA  --    To walk in hospital room?  --  3  -TA  --    AM-PAC 6 Clicks Score (PT)  --  18  -TA  --       How much help from another is currently needed...    Putting on and taking off regular lower body clothing?  3  -CS  --  3  -ME    Bathing (including washing, rinsing, and drying)  3  -CS  --  3  -ME    Toileting (which includes using toilet bed pan or urinal)  3  -CS  --  3  -ME    Putting on and taking off regular upper body clothing  3  -CS  --  3  -ME    Taking care of personal grooming (such as brushing teeth)  3  -CS  --  3  -ME    Eating meals  4  -CS  --  3  -ME    AM-PAC 6 Clicks Score (OT)  19  -CS  --  18  -ME       Functional Assessment    Outcome Measure Options  --  AM-PAC 6 Clicks Basic Mobility (PT)  -TA  AM-PAC 6 Clicks Daily Activity (OT)  -ME      User Key  (r) = Recorded By, (t) = Taken By, (c) = Cosigned By    Initials Name Provider Type    TA Haritha Torres, MARQUIS Physical Therapy Assistant    Jacqueline Pugh COTA/KELLY Occupational Therapy Assistant    ME Alexandr Steele OTR/L Occupational Therapist           Time Calculation:   Time Calculation- OT     Row Name 09/10/20 1154             Time Calculation- OT    OT Start Time  0855  -CS      OT Stop Time  0936  -      OT Time Calculation (min)  41 min  -CS      Total Timed Code Minutes- OT  41 minute(s)  -      OT Received On  09/10/20  -        User Key  (r) = Recorded By, (t) = Taken By, (c) = Cosigned By    Initials Name Provider Type    Jacqueline Pugh, STEPHANIE/KELLY Occupational Therapy Assistant         Therapy Charges for Today     Code Description Service Date Service Provider Modifiers Qty    [id number] HC OT SELF CARE/MGMT/TRAIN EA 15 MIN 9/10/2020 Jacqueline Vale, STEPHANIE/L  3               STEPHANIE Pfeiffer/KELLY  9/10/2020

## 2020-09-10 NOTE — PROGRESS NOTES
[facility] Cardiology  INPATIENT PROGRESS NOTE    Name: Mark Sharma  Age/Sex: 59 y.o. male  :  1960        PCP: Tyler Singh MD    Vital Signs  Temp:  [97.8 °F (36.6 °C)-98.4 °F (36.9 °C)] 98.4 °F (36.9 °C)  Heart Rate:  [] 83  Resp:  [15-20] 15  BP: (102-120)/(69-83) 118/74  Body mass index is 24.81 kg/m².     Subjective   Extubated. Awake and alert  Patient Active Problem List   Diagnosis   • Acute respiratory failure with hypoxia (CMS/HCC)   • Aortic stenosis, severe   • Acute systolic CHF (congestive heart failure) (CMS/HCC)       Past Medical History:   Diagnosis Date   • Hypertension        Current Facility-Administered Medications   Medication Dose Route Frequency Provider Last Rate Last Dose   • bisoprolol (ZEBeta) half tablet 2.5 mg  2.5 mg Oral Q24H Veronica Gonzalez MD   2.5 mg at 09/10/20 0818   • budesonide-formoterol (SYMBICORT) 160-4.5 MCG/ACT inhaler 2 puff  2 puff Inhalation BID - RT Iker Parker MD   2 puff at 09/10/20 0745   • cefTRIAXone (ROCEPHIN) 1 g/100 mL 0.9% NS (MBP)  1 g Intravenous Q24H Iker Parker MD   1 g at 20 1323   • famotidine (PEPCID) injection 20 mg  20 mg Intravenous Q12H Madi Griffith MD   20 mg at 09/10/20 0819   • fentaNYL citrate (PF) (SUBLIMAZE) injection 25 mcg  25 mcg Intravenous Q2H PRN Iker Parker MD       • furosemide (LASIX) injection 40 mg  40 mg Intravenous Q12H Veronica Gonzalez MD   40 mg at 09/10/20 0818   • ipratropium-albuterol (DUO-NEB) nebulizer solution 3 mL  3 mL Nebulization 4x Daily - RT Iker Parker MD   3 mL at 09/10/20 0745   • LORazepam (ATIVAN) tablet 1 mg  1 mg Oral Q2H PRN Iker Parker MD        Or   • LORazepam (ATIVAN) injection 1 mg  1 mg Intravenous Q2H PRN Iker Parker MD        Or   • LORazepam (ATIVAN) tablet 2 mg  2 mg Oral Q1H PRN Iker Parker MD        Or   • LORazepam (ATIVAN) injection 2 mg  2 mg  Intravenous Q1H PRN Iker Parker MD        Or   • LORazepam (ATIVAN) injection 2 mg  2 mg Intravenous Q15 Min PRN Iker Parker MD        Or   • LORazepam (ATIVAN) injection 2 mg  2 mg Intramuscular Q15 Min PRN Iker Parker MD        Or   • LORazepam (ATIVAN) tablet 4 mg  4 mg Oral Q1H PRN Iker Parker MD        Or   • LORazepam (ATIVAN) injection 4 mg  4 mg Intravenous Q1H PRN Iker Parker MD       • nicotine (NICODERM CQ) 14 MG/24HR patch 1 patch  1 patch Transdermal Q24H Iker Parker MD   1 patch at 09/08/20 0811   • sodium chloride 0.9 % flush 10 mL  10 mL Intravenous PRN Vic Rodriguez MD       • sodium chloride 0.9 % flush 10 mL  10 mL Intravenous Q12H Urbano Osborn MD   10 mL at 09/10/20 0819   • sodium chloride 0.9 % flush 10 mL  10 mL Intravenous PRN Urbano Osborn MD       • thiamine (B-1) 100 mg in sodium chloride 0.9 % 100 mL IVPB  100 mg Intravenous Daily Iker Parker  mL/hr at 09/10/20 0818 100 mg at 09/10/20 0818       Past Surgical History:   Procedure Laterality Date   • APPENDECTOMY     • COLONOSCOPY N/A 12/20/2019    Procedure: COLONOSCOPY;  Surgeon: Dion Ambrocio DO;  Location: [facility] ENDOSCOPY;  Service: Gastroenterology       Social History     Socioeconomic History   • Marital status: Single     Spouse name: Not on file   • Number of children: Not on file   • Years of education: Not on file   • Highest education level: Not on file   Tobacco Use   • Smoking status: Former Smoker     Years: 2.00     Types: Cigars   • Smokeless tobacco: Never Used   Substance and Sexual Activity   • Alcohol use: Yes     Alcohol/week: 2.0 standard drinks     Types: 2 Cans of beer per week     Comment: 2 beer/wk maybe 6-8 on wknd   • Drug use: Not Currently   • Sexual activity: Defer       O/E    Neck: Supple.  No JVD, no thyroid enlargement.  Chest: Air entry equal, normal respiration.  No rhonchi or creps.  Cardiovascular system:  Regular  rate and rhythm, 2/6 murmurs.  Abdomen: Soft, no tenderness, bowel sounds present, no hepatosplenomegaly.  CNS: Alert, oriented to place and time.  No motor or sensory deficit.  Cranial nerves intact.  Musculoskeletal: No deformity of the back or spine.  Extremities:  No edema.  Pulses equal on both sides.    Lab Results (last 24 hours)     Procedure Component Value Units Date/Time    CBC & Differential [[id number]] Collected:  09/10/20 0326    Specimen:  Blood Updated:  09/10/20 0343    Narrative:       The following orders were created for panel order CBC & Differential.  Procedure                               Abnormality         Status                     ---------                               -----------         ------                     CBC Auto Differential[[id number]]        Abnormal            Final result                 Please view results for these tests on the individual orders.    Comprehensive Metabolic Panel [[id number]]  (Abnormal) Collected:  09/10/20 0326    Specimen:  Blood Updated:  09/10/20 0417     Glucose 103 mg/dL      BUN 21 mg/dL      Creatinine 0.85 mg/dL      Sodium 140 mmol/L      Potassium 3.4 mmol/L      Chloride 99 mmol/L      CO2 29.0 mmol/L      Calcium 8.9 mg/dL      Total Protein 6.0 g/dL      Albumin 3.60 g/dL      ALT (SGPT) 43 U/L      AST (SGOT) 33 U/L      Alkaline Phosphatase 69 U/L      Total Bilirubin 0.7 mg/dL      eGFR Non African Amer 92 mL/min/1.73      Globulin 2.4 gm/dL      A/G Ratio 1.5 g/dL      BUN/Creatinine Ratio 24.7     Anion Gap 12.0 mmol/L     Narrative:       GFR Normal >60  Chronic Kidney Disease <60  Kidney Failure <15      Magnesium [[id number]]  (Normal) Collected:  09/10/20 0326    Specimen:  Blood Updated:  09/10/20 0417     Magnesium 2.2 mg/dL     Phosphorus [[id number]]  (Abnormal) Collected:  09/10/20 0326    Specimen:  Blood Updated:  09/10/20 0421     Phosphorus 5.1 mg/dL     CBC Auto Differential [[id number]]  (Abnormal) Collected:  09/10/20 0326      Specimen:  Blood Updated:  09/10/20 0343     WBC 8.16 10*3/mm3      RBC 4.51 10*6/mm3      Hemoglobin 14.8 g/dL      Hematocrit 42.4 %      MCV 94.0 fL      MCH 32.8 pg      MCHC 34.9 g/dL      RDW 12.1 %      RDW-SD 42.2 fl      MPV 11.0 fL      Platelets 171 10*3/mm3      Neutrophil % 64.0 %      Lymphocyte % 19.5 %      Monocyte % 9.4 %      Eosinophil % 6.5 %      Basophil % 0.4 %      Immature Grans % 0.2 %      Neutrophils, Absolute 5.22 10*3/mm3      Lymphocytes, Absolute 1.59 10*3/mm3      Monocytes, Absolute 0.77 10*3/mm3      Eosinophils, Absolute 0.53 10*3/mm3      Basophils, Absolute 0.03 10*3/mm3      Immature Grans, Absolute 0.02 10*3/mm3      nRBC 0.0 /100 WBC           A/P  As  lv failure secondary to AS  Plan cath today      The indications, risks and benefits of diagnostic left heart cardiac catheterization, angiography, conscious sedation, and possible blood transfusion were discussed in detail with the patient. The potential complications of 1/2000 chance of death, 1/1000 chance of heart attack or stroke, 1/500 chance of bleeding or clotting of the femoral artery, and 1/500 chance of allergic reaction to contrast were discussed. We also reviewed possible complications of infection and kidney dysfunction. If PCI were performed and intra-coronary stents indicated, we discussed the details about  ANIL. This included a review of the risks of the infrequent, but relatively higher incidence of late thrombosis with ANIL. The importance of maintaining a consistent daily regimen of aspirin and an additional anti-platelet agent  for as long as directed after implantation was emphasized. No contraindications were found.  The patient  appeared to understand and agree to the above.     ASA1 MAL2        This document has been electronically signed by Veronica Gonzalez MD on September 10, 2020 08:50         Part of this note may be an electronic transcription/translation of spoken language to printed text  using the Dragon Dictation System.

## 2020-09-11 VITALS
SYSTOLIC BLOOD PRESSURE: 98 MMHG | DIASTOLIC BLOOD PRESSURE: 78 MMHG | WEIGHT: 186 LBS | HEART RATE: 80 BPM | BODY MASS INDEX: 25.19 KG/M2 | RESPIRATION RATE: 16 BRPM | TEMPERATURE: 97.8 F | HEIGHT: 72 IN | OXYGEN SATURATION: 100 %

## 2020-09-11 PROBLEM — E43 SEVERE MALNUTRITION (HCC): Status: ACTIVE | Noted: 2020-09-11

## 2020-09-11 LAB
ACT BLD: 146 SECONDS (ref 82–152)
ALBUMIN SERPL-MCNC: 3.5 G/DL (ref 3.5–5.2)
ALBUMIN/GLOB SERPL: 1.7 G/DL
ALP SERPL-CCNC: 67 U/L (ref 39–117)
ALT SERPL W P-5'-P-CCNC: 40 U/L (ref 1–41)
ANION GAP SERPL CALCULATED.3IONS-SCNC: 10 MMOL/L (ref 5–15)
AST SERPL-CCNC: 54 U/L (ref 1–40)
BASOPHILS # BLD AUTO: 0.04 10*3/MM3 (ref 0–0.2)
BASOPHILS NFR BLD AUTO: 0.4 % (ref 0–1.5)
BILIRUB SERPL-MCNC: 1.1 MG/DL (ref 0–1.2)
BUN SERPL-MCNC: 19 MG/DL (ref 6–20)
BUN/CREAT SERPL: 25.3 (ref 7–25)
CALCIUM SPEC-SCNC: 8.3 MG/DL (ref 8.6–10.5)
CHLORIDE SERPL-SCNC: 103 MMOL/L (ref 98–107)
CHOLEST SERPL-MCNC: 165 MG/DL (ref 0–200)
CO2 SERPL-SCNC: 26 MMOL/L (ref 22–29)
CREAT SERPL-MCNC: 0.75 MG/DL (ref 0.76–1.27)
DEPRECATED RDW RBC AUTO: 41.1 FL (ref 37–54)
EOSINOPHIL # BLD AUTO: 0.59 10*3/MM3 (ref 0–0.4)
EOSINOPHIL NFR BLD AUTO: 6.6 % (ref 0.3–6.2)
ERYTHROCYTE [DISTWIDTH] IN BLOOD BY AUTOMATED COUNT: 12 % (ref 12.3–15.4)
GFR SERPL CREATININE-BSD FRML MDRD: 107 ML/MIN/1.73
GLOBULIN UR ELPH-MCNC: 2.1 GM/DL
GLUCOSE SERPL-MCNC: 94 MG/DL (ref 65–99)
HCT VFR BLD AUTO: 39.8 % (ref 37.5–51)
HDLC SERPL-MCNC: 36 MG/DL (ref 40–60)
HGB BLD-MCNC: 14.5 G/DL (ref 13–17.7)
IMM GRANULOCYTES # BLD AUTO: 0.02 10*3/MM3 (ref 0–0.05)
IMM GRANULOCYTES NFR BLD AUTO: 0.2 % (ref 0–0.5)
LDLC SERPL CALC-MCNC: 106 MG/DL (ref 0–100)
LDLC/HDLC SERPL: 2.93 {RATIO}
LYMPHOCYTES # BLD AUTO: 1.47 10*3/MM3 (ref 0.7–3.1)
LYMPHOCYTES NFR BLD AUTO: 16.5 % (ref 19.6–45.3)
MAGNESIUM SERPL-MCNC: 2 MG/DL (ref 1.6–2.6)
MCH RBC QN AUTO: 33.6 PG (ref 26.6–33)
MCHC RBC AUTO-ENTMCNC: 36.4 G/DL (ref 31.5–35.7)
MCV RBC AUTO: 92.1 FL (ref 79–97)
MONOCYTES # BLD AUTO: 0.93 10*3/MM3 (ref 0.1–0.9)
MONOCYTES NFR BLD AUTO: 10.4 % (ref 5–12)
NEUTROPHILS NFR BLD AUTO: 5.87 10*3/MM3 (ref 1.7–7)
NEUTROPHILS NFR BLD AUTO: 65.9 % (ref 42.7–76)
NRBC BLD AUTO-RTO: 0 /100 WBC (ref 0–0.2)
PHOSPHATE SERPL-MCNC: 3.5 MG/DL (ref 2.5–4.5)
PLATELET # BLD AUTO: 165 10*3/MM3 (ref 140–450)
PMV BLD AUTO: 11.5 FL (ref 6–12)
POTASSIUM SERPL-SCNC: 4.2 MMOL/L (ref 3.5–5.2)
PROT SERPL-MCNC: 5.6 G/DL (ref 6–8.5)
RBC # BLD AUTO: 4.32 10*6/MM3 (ref 4.14–5.8)
SODIUM SERPL-SCNC: 139 MMOL/L (ref 136–145)
TRIGL SERPL-MCNC: 117 MG/DL (ref 0–150)
VLDLC SERPL-MCNC: 23.4 MG/DL
WBC # BLD AUTO: 8.92 10*3/MM3 (ref 3.4–10.8)

## 2020-09-11 PROCEDURE — 80061 LIPID PANEL: CPT | Performed by: INTERNAL MEDICINE

## 2020-09-11 PROCEDURE — 85025 COMPLETE CBC W/AUTO DIFF WBC: CPT | Performed by: INTERNAL MEDICINE

## 2020-09-11 PROCEDURE — 80053 COMPREHEN METABOLIC PANEL: CPT | Performed by: INTERNAL MEDICINE

## 2020-09-11 PROCEDURE — 84100 ASSAY OF PHOSPHORUS: CPT | Performed by: INTERNAL MEDICINE

## 2020-09-11 PROCEDURE — 99233 SBSQ HOSP IP/OBS HIGH 50: CPT | Performed by: INTERNAL MEDICINE

## 2020-09-11 PROCEDURE — 83735 ASSAY OF MAGNESIUM: CPT | Performed by: INTERNAL MEDICINE

## 2020-09-11 PROCEDURE — 94799 UNLISTED PULMONARY SVC/PX: CPT

## 2020-09-11 PROCEDURE — 85347 COAGULATION TIME ACTIVATED: CPT

## 2020-09-11 RX ORDER — FUROSEMIDE 40 MG/1
40 TABLET ORAL DAILY
Qty: 30 TABLET | Refills: 0 | Status: SHIPPED | OUTPATIENT
Start: 2020-09-11

## 2020-09-11 RX ORDER — POTASSIUM CHLORIDE 1500 MG/1
20 TABLET, FILM COATED, EXTENDED RELEASE ORAL DAILY
Qty: 30 TABLET | Refills: 0 | Status: SHIPPED | OUTPATIENT
Start: 2020-09-11

## 2020-09-11 RX ORDER — BISOPROLOL FUMARATE 5 MG/1
2.5 TABLET, FILM COATED ORAL
Qty: 30 TABLET | Refills: 0 | Status: SHIPPED | OUTPATIENT
Start: 2020-09-12

## 2020-09-11 RX ORDER — ATORVASTATIN CALCIUM 40 MG/1
40 TABLET, FILM COATED ORAL DAILY
Qty: 30 TABLET | Refills: 0 | Status: SHIPPED | OUTPATIENT
Start: 2020-09-11

## 2020-09-11 RX ORDER — CLOPIDOGREL BISULFATE 75 MG/1
75 TABLET ORAL DAILY
Qty: 30 TABLET | Refills: 0 | Status: SHIPPED | OUTPATIENT
Start: 2020-09-11

## 2020-09-11 RX ORDER — ASPIRIN 81 MG/1
81 TABLET ORAL DAILY
Qty: 30 TABLET | Refills: 0 | Status: SHIPPED | OUTPATIENT
Start: 2020-09-12

## 2020-09-11 RX ADMIN — SODIUM CHLORIDE 100 ML/HR: 9 INJECTION, SOLUTION INTRAVENOUS at 06:18

## 2020-09-11 RX ADMIN — IPRATROPIUM BROMIDE AND ALBUTEROL SULFATE 3 ML: 2.5; .5 SOLUTION RESPIRATORY (INHALATION) at 09:01

## 2020-09-11 RX ADMIN — Medication 100 MG: at 08:31

## 2020-09-11 RX ADMIN — PRASUGREL 10 MG: 10 TABLET, FILM COATED ORAL at 08:30

## 2020-09-11 RX ADMIN — FUROSEMIDE 40 MG: 40 TABLET ORAL at 08:31

## 2020-09-11 RX ADMIN — ASPIRIN 81 MG: 81 TABLET, COATED ORAL at 08:30

## 2020-09-11 RX ADMIN — SODIUM CHLORIDE, PRESERVATIVE FREE 10 ML: 5 INJECTION INTRAVENOUS at 08:31

## 2020-09-11 RX ADMIN — Medication 2.5 MG: at 08:30

## 2020-09-11 RX ADMIN — FAMOTIDINE 20 MG: 10 INJECTION INTRAVENOUS at 08:30

## 2020-09-11 NOTE — DISCHARGE SUMMARY
[facility] Medicine Services  DISCHARGE SUMMARY   LEFT AMA      Date of Admission: 9/5/2020  Date of Discharge:  9/11/2020  Primary Care Physician: Tyler Singh MD    Presenting Problem/History of Present Illness:  Pneumonia due to organism [J18.9]  COPD exacerbation (CMS/[facility]) [J44.1]  Acute respiratory failure with hypoxia (CMS/[facility]) [J96.01]       Final Discharge Diagnoses:  Active Hospital Problems    Diagnosis   • **Acute respiratory failure with hypoxia (CMS/[facility])   • Severe malnutrition (CMS/[facility])   • Aortic stenosis, severe   • Acute systolic CHF (congestive heart failure) (CMS/[facility])       Consults:   Consults     Date and Time Order Name Status Description    9/5/2020 1424 Inpatient Cardiology Consult Completed           Procedures Performed: Procedure(s):  Left Heart Cath                Pertinent Test Results:   Lab Results (most recent)     Procedure Component Value Units Date/Time    Phosphorus [[id number]]  (Normal) Collected:  09/11/20 0545    Specimen:  Blood Updated:  09/11/20 0642     Phosphorus 3.5 mg/dL     Comprehensive Metabolic Panel [[id number]]  (Abnormal) Collected:  09/11/20 0545    Specimen:  Blood Updated:  09/11/20 0635     Glucose 94 mg/dL      BUN 19 mg/dL      Creatinine 0.75 mg/dL      Sodium 139 mmol/L      Potassium 4.2 mmol/L      Chloride 103 mmol/L      CO2 26.0 mmol/L      Calcium 8.3 mg/dL      Total Protein 5.6 g/dL      Albumin 3.50 g/dL      ALT (SGPT) 40 U/L      AST (SGOT) 54 U/L      Alkaline Phosphatase 67 U/L      Total Bilirubin 1.1 mg/dL      eGFR Non African Amer 107 mL/min/1.73      Globulin 2.1 gm/dL      A/G Ratio 1.7 g/dL      BUN/Creatinine Ratio 25.3     Anion Gap 10.0 mmol/L     Narrative:       GFR Normal >60  Chronic Kidney Disease <60  Kidney Failure <15      Magnesium [[id number]]  (Normal) Collected:  09/11/20 0545    Specimen:  Blood Updated:  09/11/20 0635     Magnesium 2.0 mg/dL     Lipid Panel [[id number]]   (Abnormal) Collected:  09/11/20 0545    Specimen:  Blood Updated:  09/11/20 0635     Total Cholesterol 165 mg/dL      Triglycerides 117 mg/dL      HDL Cholesterol 36 mg/dL      LDL Cholesterol  106 mg/dL      VLDL Cholesterol 23.4 mg/dL      LDL/HDL Ratio 2.93    Narrative:       Cholesterol Reference Ranges  (U.S. Department of Health and Human Services ATP III Classifications)    Desirable          <200 mg/dL  Borderline High    200-239 mg/dL  High Risk          >240 mg/dL      Triglyceride Reference Ranges  (U.S. Department of Health and Human Services ATP III Classifications)    Normal           <150 mg/dL  Borderline High  150-199 mg/dL  High             200-499 mg/dL  Very High        >500 mg/dL    HDL Reference Ranges  (U.S. Department of Health and Human Services ATP III Classifcations)    Low     <40 mg/dl (major risk factor for CHD)  High    >60 mg/dl ('negative' risk factor for CHD)        LDL Reference Ranges  (U.S. Department of Health and Human Services ATP III Classifcations)    Optimal          <100 mg/dL  Near Optimal     100-129 mg/dL  Borderline High  130-159 mg/dL  High             160-189 mg/dL  Very High        >189 mg/dL    CBC & Differential [[id number]] Collected:  09/11/20 0545    Specimen:  Blood Updated:  09/11/20 0609    Narrative:       The following orders were created for panel order CBC & Differential.  Procedure                               Abnormality         Status                     ---------                               -----------         ------                     CBC Auto Differential[[id number]]        Abnormal            Final result                 Please view results for these tests on the individual orders.    CBC Auto Differential [[id number]]  (Abnormal) Collected:  09/11/20 0545    Specimen:  Blood Updated:  09/11/20 0609     WBC 8.92 10*3/mm3      RBC 4.32 10*6/mm3      Hemoglobin 14.5 g/dL      Hematocrit 39.8 %      MCV 92.1 fL      MCH 33.6 pg      MCHC 36.4 g/dL       RDW 12.0 %      RDW-SD 41.1 fl      MPV 11.5 fL      Platelets 165 10*3/mm3      Neutrophil % 65.9 %      Lymphocyte % 16.5 %      Monocyte % 10.4 %      Eosinophil % 6.6 %      Basophil % 0.4 %      Immature Grans % 0.2 %      Neutrophils, Absolute 5.87 10*3/mm3      Lymphocytes, Absolute 1.47 10*3/mm3      Monocytes, Absolute 0.93 10*3/mm3      Eosinophils, Absolute 0.59 10*3/mm3      Basophils, Absolute 0.04 10*3/mm3      Immature Grans, Absolute 0.02 10*3/mm3      nRBC 0.0 /100 WBC     POC Activated Clotting Time [[id number]]  (Normal) Collected:  09/11/20 0001    Specimen:  Blood Updated:  09/11/20 0005     Activated Clotting Time  146.0000 Seconds      Comment: Serial Number: [id number]:  [id number]       POC Activated Clotting Time [[id number]]  (Abnormal) Collected:  09/10/20 2203    Specimen:  Blood Updated:  09/10/20 2207     Activated Clotting Time  183.0000 Seconds      Comment: Serial Number: [id number]:  [id number]       Comprehensive Metabolic Panel [[id number]]  (Abnormal) Collected:  09/10/20 0931    Specimen:  Blood Updated:  09/10/20 1008     Glucose 158 mg/dL      BUN 22 mg/dL      Creatinine 0.88 mg/dL      Sodium 137 mmol/L      Potassium 3.5 mmol/L      Chloride 96 mmol/L      CO2 29.0 mmol/L      Calcium 9.4 mg/dL      Total Protein 7.0 g/dL      Albumin 4.20 g/dL      ALT (SGPT) 52 U/L      AST (SGOT) 38 U/L      Alkaline Phosphatase 81 U/L      Total Bilirubin 1.0 mg/dL      eGFR Non African Amer 89 mL/min/1.73      Globulin 2.8 gm/dL      A/G Ratio 1.5 g/dL      BUN/Creatinine Ratio 25.0     Anion Gap 12.0 mmol/L     Narrative:       GFR Normal >60  Chronic Kidney Disease <60  Kidney Failure <15      Protime-INR [[id number]]  (Normal) Collected:  09/10/20 0931    Specimen:  Blood Updated:  09/10/20 1001     Protime 14.6 Seconds      INR 1.09    Narrative:       Therapeutic range for most indications is 2.0-3.0 INR,  or 2.5-3.5 for mechanical heart valves.    CBC (No Diff)  [[id number]]  (Normal) Collected:  09/10/20 0931    Specimen:  Blood Updated:  09/10/20 0941     WBC 8.61 10*3/mm3      RBC 4.88 10*6/mm3      Hemoglobin 16.1 g/dL      Hematocrit 45.8 %      MCV 93.9 fL      MCH 33.0 pg      MCHC 35.2 g/dL      RDW 12.3 %      RDW-SD 42.5 fl      MPV 11.1 fL      Platelets 188 10*3/mm3     Blood Culture - Blood, Arm, Left [[id number]] Collected:  09/05/20 0525    Specimen:  Blood from Arm, Left Updated:  09/10/20 0545     Blood Culture No growth at 5 days    Phosphorus [[id number]]  (Abnormal) Collected:  09/10/20 0326    Specimen:  Blood Updated:  09/10/20 0421     Phosphorus 5.1 mg/dL     Magnesium [[id number]]  (Normal) Collected:  09/10/20 0326    Specimen:  Blood Updated:  09/10/20 0417     Magnesium 2.2 mg/dL     CBC & Differential [[id number]] Collected:  09/10/20 0326    Specimen:  Blood Updated:  09/10/20 0343    Narrative:       The following orders were created for panel order CBC & Differential.  Procedure                               Abnormality         Status                     ---------                               -----------         ------                     CBC Auto Differential[[id number]]        Abnormal            Final result                 Please view results for these tests on the individual orders.    CBC Auto Differential [[id number]]  (Abnormal) Collected:  09/10/20 0326    Specimen:  Blood Updated:  09/10/20 0343     WBC 8.16 10*3/mm3      RBC 4.51 10*6/mm3      Hemoglobin 14.8 g/dL      Hematocrit 42.4 %      MCV 94.0 fL      MCH 32.8 pg      MCHC 34.9 g/dL      RDW 12.1 %      RDW-SD 42.2 fl      MPV 11.0 fL      Platelets 171 10*3/mm3      Neutrophil % 64.0 %      Lymphocyte % 19.5 %      Monocyte % 9.4 %      Eosinophil % 6.5 %      Basophil % 0.4 %      Immature Grans % 0.2 %      Neutrophils, Absolute 5.22 10*3/mm3      Lymphocytes, Absolute 1.59 10*3/mm3      Monocytes, Absolute 0.77 10*3/mm3      Eosinophils, Absolute 0.53 10*3/mm3      Basophils,  Absolute 0.03 10*3/mm3      Immature Grans, Absolute 0.02 10*3/mm3      nRBC 0.0 /100 WBC     BNP [[id number]]  (Abnormal) Collected:  09/08/20 0504    Specimen:  Blood Updated:  09/08/20 1411     proBNP 4,314.0 pg/mL     Narrative:       Among patients with dyspnea, NT-proBNP is highly sensitive for the detection of acute congestive heart failure. In addition NT-proBNP of <300 pg/ml effectively rules out acute congestive heart failure with 99% negative predictive value.    Results may be falsely decreased if patient taking Biotin.      Blood Gas, Arterial [[id number]]  (Abnormal) Collected:  09/08/20 1030    Specimen:  Arterial Blood Updated:  09/08/20 1055     Site Right Radial     Lenny's Test N/A     pH, Arterial 7.473 pH units      Comment: 83 Value above reference range        pCO2, Arterial 36.4 mm Hg      pO2, Arterial 75.1 mm Hg      Comment: 84 Value below reference range        HCO3, Arterial 26.6 mmol/L      Comment: 83 Value above reference range        Base Excess, Arterial 3.1 mmol/L      Comment: 83 Value above reference range        O2 Saturation, Arterial 96.0 %      Barometric Pressure for Blood Gas 748 mmHg      Modality Room Air     Flow Rate 2.0 lpm      Ventilator Mode NA     Collected by RT     Comment: Meter: [id number]     :  [id number]       Blood Gas, Arterial [[id number]]  (Abnormal) Collected:  09/08/20 0646    Specimen:  Arterial Blood Updated:  09/08/20 0658     Site Right Radial     Lenny's Test Positive     pH, Arterial 7.478 pH units      Comment: 83 Value above reference range        pCO2, Arterial 32.8 mm Hg      Comment: 84 Value below reference range        pO2, Arterial 104.0 mm Hg      HCO3, Arterial 24.3 mmol/L      Base Excess, Arterial 1.4 mmol/L      O2 Saturation, Arterial 98.8 %      Barometric Pressure for Blood Gas 747 mmHg      Modality Ventilator     FIO2 35 %      Ventilator Mode PSV     PEEP 5.0     PSV 8.0 cmH2O      Collected by RT     Comment: Meter:  [id number]     :  [id number]       Blood Culture - Blood, Hand, Right [[id number]]  (Abnormal) Collected:  09/06/20 0545    Specimen:  Blood from Hand, Right Updated:  09/08/20 0519     Blood Culture Staphylococcus, coagulase negative     Comment: Probable contaminant requires clinical correlation, susceptibility not performed unless requested by physician.          Isolated from Aerobic Bottle     Gram Stain Aerobic Bottle Gram positive cocci in clusters     Comment: 1 positive for gram positive cocci of 4 drawn       Blood Culture ID, PCR - Blood, Hand, Right [[id number]]  (Abnormal) Collected:  09/06/20 0545    Specimen:  Blood from Hand, Right Updated:  09/07/20 0936     BCID, PCR Staphylococcus species, not aureus. mecA (methicillin resistance gene) detected. Identification by BCID PCR.    Respiratory Culture - Sputum, NT Suction [[id number]] Collected:  09/05/20 1610    Specimen:  Sputum from NT Suction Updated:  09/07/20 0651     Respiratory Culture Rare Normal Respiratory Herlinda: NO S.aureus/MRSA or Pseudomonas aeruginosa     Gram Stain Moderate (3+) WBCs per low power field      Rare (1+) Epithelial cells per low power field      Mixed bacterial herlinda    Troponin [[id number]]  (Abnormal) Collected:  09/06/20 0219    Specimen:  Blood Updated:  09/06/20 0337     Troponin T 0.237 ng/mL     Narrative:       Troponin T Reference Range:  <= 0.03 ng/mL-   Negative for AMI  >0.03 ng/mL-     Abnormal for myocardial necrosis.  Clinicians would have to utilize clinical acumen, EKG, Troponin and serial changes to determine if it is an Acute Myocardial Infarction or myocardial injury due to an underlying chronic condition.       Results may be falsely decreased if patient taking Biotin.      Urinalysis With Culture If Indicated - Urine, Catheter [[id number]]  (Abnormal) Collected:  09/05/20 1553    Specimen:  Urine, Catheter Updated:  09/05/20 1600     Color, UA Yellow     Appearance, UA Clear     pH, UA 5.5      Specific Gravity, UA 1.020     Glucose, UA Negative     Ketones, UA Trace     Bilirubin, UA Negative     Blood, UA Moderate (2+)     Protein, UA Negative     Leuk Esterase, UA Negative     Nitrite, UA Negative     Urobilinogen, UA 0.2 E.U./dL    Urinalysis, Microscopic Only - Urine, Catheter [[id number]]  (Abnormal) Collected:  09/05/20 1553    Specimen:  Urine, Catheter Updated:  09/05/20 1600     RBC, UA 21-30 /HPF      WBC, UA 0-2 /HPF      Bacteria, UA None Seen /HPF      Squamous Epithelial Cells, UA None Seen /HPF      Hyaline Casts, UA None Seen /LPF      Methodology Automated Microscopy    Troponin [[id number]]  (Abnormal) Collected:  09/05/20 1335    Specimen:  Blood Updated:  09/05/20 1416     Troponin T 0.221 ng/mL     Narrative:       Troponin T Reference Range:  <= 0.03 ng/mL-   Negative for AMI  >0.03 ng/mL-     Abnormal for myocardial necrosis.  Clinicians would have to utilize clinical acumen, EKG, Troponin and serial changes to determine if it is an Acute Myocardial Infarction or myocardial injury due to an underlying chronic condition.       Results may be falsely decreased if patient taking Biotin.      Extra Tubes [[id number]] Collected:  09/05/20 0932    Specimen:  Blood, Venous Line Updated:  09/05/20 1045    Narrative:       The following orders were created for panel order Extra Tubes.  Procedure                               Abnormality         Status                     ---------                               -----------         ------                     Lavender Top[[id number]]                                     Final result                 Please view results for these tests on the individual orders.    Lavender Top [[id number]] Collected:  09/05/20 0932    Specimen:  Blood Updated:  09/05/20 1045     Extra Tube hold for add-on     Comment: Auto resulted       Lactic Acid, Reflex [[id number]]  (Normal) Collected:  09/05/20 0932    Specimen:  Blood Updated:  09/05/20 1007     Lactate 1.0  mmol/L     COVID PRE-OP / PRE-PROCEDURE SCREENING ORDER (NO ISOLATION) - Swab, Nasopharynx [[id number]] Collected:  09/05/20 0640    Specimen:  Swab from Nasopharynx Updated:  09/05/20 1003    Narrative:       The following orders were created for panel order COVID PRE-OP / PRE-PROCEDURE SCREENING ORDER (NO ISOLATION) - Swab, Nasopharynx.  Procedure                               Abnormality         Status                     ---------                               -----------         ------                     COVID-19, [facility] IN-HOUS...[[id number]]  Normal              Final result                 Please view results for these tests on the individual orders.    COVID-19, [facility] IN-HOUSE, NP SWAB IN TRANSPORT MEDIA 8-10 HR TAT - Swab, Nasopharynx [[id number]]  (Normal) Collected:  09/05/20 0640    Specimen:  Swab from Nasopharynx Updated:  09/05/20 1003     COVID19 Not Detected    Narrative:       Testing performed by Real Time RT-PCR  This test has not been approved by the [facility] but is authorized under the Emergency Use Act (EUA)    Fact sheet for providers: https://www.fda.gov/media/447202/download    Fact sheet for patients: https://www.fda.gov/media/170201/download        Lactic Acid, Reflex Timer (This will reflex a repeat order 3-3:15 hours after ordered.) [[id number]] Collected:  09/05/20 0525    Specimen:  Blood Updated:  09/05/20 0915     Hold Tube Hold for add-ons.     Comment: Auto resulted.       Hemoglobin A1c [[id number]]  (Normal) Collected:  09/05/20 0438    Specimen:  Blood Updated:  09/05/20 0735     Hemoglobin A1C 5.30 %     Narrative:       Hemoglobin A1C Ranges:    Increased Risk for Diabetes  5.7% to 6.4%  Diabetes                     >= 6.5%  Diabetic Goal                < 7.0%    Lactic Acid, Plasma [[id number]]  (Abnormal) Collected:  09/05/20 0525    Specimen:  Blood Updated:  09/05/20 0604     Lactate 2.2 mmol/L     Iva Draw [[id number]] Collected:  09/05/20 0437    Specimen:  Blood Updated:   09/05/20 0545    Narrative:       The following orders were created for panel order [place] Draw.  Procedure                               Abnormality         Status                     ---------                               -----------         ------                     Light Blue Top[[id number]]                                   Final result               Green Top (Gel)[[id number]]                                  Final result               Lavender Top[[id number]]                                     Final result               Gold Top - SST[[id number]]                                   Final result                 Please view results for these tests on the individual orders.    Light Blue Top [[id number]] Collected:  09/05/20 0437    Specimen:  Blood Updated:  09/05/20 0545     Extra Tube hold for add-on     Comment: Auto resulted       Green Top (Gel) [[id number]] Collected:  09/05/20 0437    Specimen:  Blood Updated:  09/05/20 0545     Extra Tube Hold for add-ons.     Comment: Auto resulted.       Lavender Top [[id number]] Collected:  09/05/20 0438    Specimen:  Blood Updated:  09/05/20 0545     Extra Tube hold for add-on     Comment: Auto resulted       Gold Top - SST [[id number]] Collected:  09/05/20 0437    Specimen:  Blood Updated:  09/05/20 0545     Extra Tube Hold for add-ons.     Comment: Auto resulted.       BNP [[id number]]  (Abnormal) Collected:  09/05/20 0437    Specimen:  Blood Updated:  09/05/20 0516     proBNP 3,740.0 pg/mL     Narrative:       Among patients with dyspnea, NT-proBNP is highly sensitive for the detection of acute congestive heart failure. In addition NT-proBNP of <300 pg/ml effectively rules out acute congestive heart failure with 99% negative predictive value.    Results may be falsely decreased if patient taking Biotin.          Imaging Results (Most Recent)     Procedure Component Value Units Date/Time    XR Chest 1 View [[id number]] Collected:  09/08/20 0431     Updated:  09/08/20 0538     Narrative:         CHEST X-RAY 1 VIEW on 9/8/2020     CLINICAL INDICATION: Follow-up CHF    COMPARISON: 9/5/2020    FINDINGS: ET tube tip is in the mid thoracic trachea. Feeding  tube extends below the diaphragm and below the level of this  film. There are small left greater than right pleural effusions.  There has been improvement in bilateral opacities consistent with  improved edema and/or pneumonia. Heart is upper limits normal for  size. Hilar and mediastinal contours are within normal limits.      Impression:       Findings most consistent with improving changes of  CHF.    Electronically signed by:  Magdiel López  9/8/2020 5:37 AM CDT  Workstation: [id number]    XR Abdomen KUB [[id number]] Collected:  09/07/20 1605     Updated:  09/07/20 1633    Narrative:       PROCEDURE: XR ABDOMEN KUB    VIEWS:  1    INDICATION:Coretrak placement    COMPARISON: None    FINDINGS:      - Bowel gas pattern: Nonobstructive    - Free air: None    - Soft tissue:No gross evidence of organomegaly,      an abdominal mass,or ascites    - Calculi:None projecting over gallbladder       fossa, renal shadows, or anticipated course of the ureters.    - Osseous:Limited assessment, unremarkable for age.    - Misc: Coretrak catheter is present with tip just right of  midline, probably within the distal stomach        Impression:       Coretrak catheter terminates just right of midline, an expected  location of the distal stomach.        Electronically signed by:  Delmy Weiss MD  9/7/2020 4:32 PM CDT  Workstation: [id number]    XR Chest 1 View [[id number]] Collected:  09/05/20 0502     Updated:  09/05/20 0533    Narrative:       PORTABLE CHEST X-RAY    CLINICAL HISTORY: respiratory distress    COMPARISON: None.    FINDINGS: Portable AP view of the chest was obtained with  overlying monitor leads in place. ET tube terminates at the level  of the mid thoracic trachea. Nasogastric tube extends below the  diaphragm but the tip is not imaged.  "There are diffuse  groundglass and interstitial opacities likely severe edema.  Superimposed pneumonia cannot be entirely excluded. There are  small bilateral effusions. Borderline cardiomegaly.      Impression:       Extensive pulmonary opacities likely edema and  effusions      Electronically signed by:  Wally Orona MD  9/5/2020 5:32 AM CDT  Workstation: [id number]          Chief Complaint on Day of Discharge: left AMA, was not seen day of discharge     Hospital Course:  HPI copied from Dr. Osborn \"Briefly this is a 59-year-old gentleman who presented the ED carrying the following problem list     1.  Hypertension  2.  History of BPH  3.  History of appendectomy  4.  History of tobacco use predominately a cigar exposure no history of cigarette use     Patient presents with a week history of progressive shortness of breath.  Of note patient was seen by his PCP on 8/22 complaining of 2-week history of epigastric pain.  Regardless patient brought in with increased respiratory rate and oxygen urgently intubated.  History is obtained from ED physician.  Patient denied any history of fevers, chills cough.  Denies any viral type symptomatology.  Patient's chest x-ray with bilateral process.  His white count was mildly elevated he had no fever although the patient post intubation is requiring vasopressors.  Of note he does have some EKG changes inferiorly bilaterally with initial troponin normal\"    He was placed on a ventilator for shortness of air. He received IV diuresis for volume overload. Echo was done and showed severe aortic stenosis. He was treated for pneumonia. He was extubated. He had cardiac cath done with PCI ANIL RCA and was recommended for transfer to be evaluated by Dr. Kingsley at [facility] for TAVR. Unfortunately, he left AMA on 9/11 prior to being transferred. I sent in medications to pharmacy given his recent stent placement. [facility] to patient and make a plan for TAVR.           Physical Exam on Discharge:  He left AMA prior to being seen.       Left Against Medical Advice    Discharge Medications:     Discharge Medications      New Medications      Instructions Start Date   aspirin 81 MG EC tablet   81 mg, Oral, Daily   Start Date:  September 12, 2020     atorvastatin 40 MG tablet  Commonly known as:  LIPITOR   40 mg, Oral, Daily      bisoprolol 5 MG tablet  Commonly known as:  ZEBeta   2.5 mg, Oral, Every 24 Hours Scheduled   Start Date:  September 12, 2020     clopidogrel 75 MG tablet  Commonly known as:  Plavix   75 mg, Oral, Daily      furosemide 40 MG tablet  Commonly known as:  LASIX   40 mg, Oral, Daily      potassium chloride ER 20 MEQ tablet controlled-release ER tablet  Commonly known as:  K-TAB   20 mEq, Oral, Daily         Continue These Medications      Instructions Start Date   MULTIVITAL PO   1 tablet/day, Oral, Daily      pantoprazole 40 MG EC tablet  Commonly known as:  PROTONIX   40 mg, Oral, Daily         Stop These Medications    amLODIPine 10 MG tablet  Commonly known as:  NORVASC     irbesartan-hydrochlorothiazide 300-12.5 MG tablet  Commonly known as:  AVALIDE     naproxen sodium 220 MG tablet  Commonly known as:  ALEVE            Discharge Diet: left AMA    Activity at Discharge: left AMA    Discharge Care Plan/Instructions: left AMA    Follow-up Appointments:   No future appointments.    Test Results Pending at Discharge:   Time: 31 min          This document has been electronically signed by Shira Dunn MD on September 11, 2020 16:49

## 2020-09-11 NOTE — PROGRESS NOTES
[facility] Cardiology  INPATIENT PROGRESS NOTE    Name: Mark Sharma  Age/Sex: 59 y.o. male  :  1960        PCP: Tyler Singh MD    Vital Signs  Temp:  [97.6 °F (36.4 °C)-98.2 °F (36.8 °C)] 97.6 °F (36.4 °C)  Heart Rate:  [74-91] 80  Resp:  [14-17] 17  BP: ()/(57-89) 120/71  Arterial Line BP: (111-141)/(63-83) 117/67  Body mass index is 25.22 kg/m².     Subjective   Doing well.no cp breathing better  Patient Active Problem List   Diagnosis   • Acute respiratory failure with hypoxia (CMS/HCC)   • Aortic stenosis, severe   • Acute systolic CHF (congestive heart failure) (CMS/HCC)       Past Medical History:   Diagnosis Date   • Hypertension        Current Facility-Administered Medications   Medication Dose Route Frequency Provider Last Rate Last Dose   • acetaminophen (TYLENOL) tablet 650 mg  650 mg Oral Q4H PRN Veronica Gonzalez MD       • ALPRAZolam (XANAX) tablet 0.25 mg  0.25 mg Oral TID PRN Veronica Gonzalez MD       • aluminum-magnesium hydroxide-simethicone (MAALOX MAX) 400-400-40 MG/5ML suspension 15 mL  15 mL Oral Q6H PRN Veronica Gonzalez MD       • aspirin EC tablet 81 mg  81 mg Oral Daily Veronica Gonzalez MD   81 mg at 20 0830   • atropine sulfate injection 0.5 mg  0.5 mg Intravenous Q5 Min PRN Veronica Gonzalez MD       • bisoprolol (ZEBeta) half tablet 2.5 mg  2.5 mg Oral Q24H Veronica Gonzalez MD   2.5 mg at 20 0830   • budesonide-formoterol (SYMBICORT) 160-4.5 MCG/ACT inhaler 2 puff  2 puff Inhalation BID - RT Veronica Gonzalez MD   2 puff at 09/10/20 0745   • cefTRIAXone (ROCEPHIN) 1 g/100 mL 0.9% NS (MBP)  1 g Intravenous Q24H Iker Parker MD   1 g at 20 1323   • diphenhydrAMINE (BENADRYL) injection 25 mg  25 mg Intravenous Q6H PRN Veronica Gonzalez MD       • famotidine (PEPCID) injection 20 mg  20 mg Intravenous Q12H Veronica Gonzalez MD   20 mg at 20 0830   • fentaNYL citrate (PF) (SUBLIMAZE)  injection 25 mcg  25 mcg Intravenous Q2H PRN Veronica Gonzalez MD       • fentaNYL citrate (PF) (SUBLIMAZE) injection 25 mcg  25 mcg Intravenous Q1H PRVeronica Johnson MD        And   • naloxone (NARCAN) injection 0.4 mg  0.4 mg Intravenous Q5 Min PRN Veronica Gonzalez MD       • furosemide (LASIX) tablet 40 mg  40 mg Oral BID Veronica Gonzalez MD   40 mg at 09/11/20 0831   • HYDROcodone-acetaminophen (NORCO) 5-325 MG per tablet 1 tablet  1 tablet Oral Q4H PRN Veronica Gonzalez MD       • ipratropium-albuterol (DUO-NEB) nebulizer solution 3 mL  3 mL Nebulization 4x Daily - RT Veronica Gonzalez MD   3 mL at 09/10/20 1208   • LORazepam (ATIVAN) tablet 1 mg  1 mg Oral Q2H PRN Veronica Gonzalez MD        Or   • LORazepam (ATIVAN) injection 1 mg  1 mg Intravenous Q2H PRVeronica Johnson MD        Or   • LORazepam (ATIVAN) tablet 2 mg  2 mg Oral Q1H PRN Veronica Gonzalez MD        Or   • LORazepam (ATIVAN) injection 2 mg  2 mg Intravenous Q1H PRVeronica Johnson MD        Or   • LORazepam (ATIVAN) injection 2 mg  2 mg Intravenous Q15 Min PRVeronica Johnson MD        Or   • LORazepam (ATIVAN) injection 2 mg  2 mg Intramuscular Q15 Min PRVeronica Johnson MD        Or   • LORazepam (ATIVAN) tablet 4 mg  4 mg Oral Q1H PRN Veronica Gonzalez MD        Or   • LORazepam (ATIVAN) injection 4 mg  4 mg Intravenous Q1H PRVeronica Johnson MD       • magnesium hydroxide (MILK OF MAGNESIA) suspension 2400 mg/10mL 10 mL  10 mL Oral Daily PRN Veronica Gonzalez MD       • nicotine (NICODERM CQ) 14 MG/24HR patch 1 patch  1 patch Transdermal Q24H Veronica Gonzalez MD   1 patch at 09/08/20 0811   • ondansetron (ZOFRAN) tablet 4 mg  4 mg Oral Q6H PRN Veronica Gonzalez MD        Or   • ondansetron (ZOFRAN) injection 4 mg  4 mg Intravenous Q6H PRN Veronica Gonzalez MD       • potassium chloride (MICRO-K) CR capsule 40 mEq  40 mEq Oral PRN Veronica Gonzalez MD   40 mEq at  09/10/20 1111   • prasugrel (EFFIENT) tablet 10 mg  10 mg Oral Daily Veronica Gonzalez MD   10 mg at 09/11/20 0830   • sodium chloride 0.9 % flush 10 mL  10 mL Intravenous PRN Veronica Gonzalez MD       • sodium chloride 0.9 % flush 10 mL  10 mL Intravenous Q12H Veronica Gonzalez MD   10 mL at 09/11/20 0831   • sodium chloride 0.9 % flush 10 mL  10 mL Intravenous PRN Veronica Gonzalez MD       • sodium chloride 0.9 % infusion 250 mL  250 mL Intravenous Once PRN Veronica Gonzalez MD       • sodium chloride 0.9 % infusion  100 mL/hr Intravenous Continuous Veronica Gonzalez  mL/hr at 09/11/20 0618 100 mL/hr at 09/11/20 0618   • temazepam (RESTORIL) capsule 15 mg  15 mg Oral Nightly PRN Veronica Gonzalez MD       • thiamine (VITAMIN B-1) tablet 100 mg  100 mg Oral Daily Veronica Gonzalez MD   100 mg at 09/11/20 0831       Past Surgical History:   Procedure Laterality Date   • APPENDECTOMY     • COLONOSCOPY N/A 12/20/2019    Procedure: COLONOSCOPY;  Surgeon: Dion Ambrocio DO;  Location: [facility] ENDOSCOPY;  Service: Gastroenterology       Social History     Socioeconomic History   • Marital status: Single     Spouse name: Not on file   • Number of children: Not on file   • Years of education: Not on file   • Highest education level: Not on file   Tobacco Use   • Smoking status: Former Smoker     Years: 2.00     Types: Cigars   • Smokeless tobacco: Never Used   Substance and Sexual Activity   • Alcohol use: Yes     Alcohol/week: 2.0 standard drinks     Types: 2 Cans of beer per week     Comment: 2 beer/wk maybe 6-8 on wknd   • Drug use: Not Currently   • Sexual activity: Defer       O/E    Neck: Supple.  No JVD, no thyroid enlargement.  Chest: Air entry equal, normal respiration.  No rhonchi or creps.  Cardiovascular system:  Regular rate and rhythm, no murmurs.  Abdomen: Soft, no tenderness, bowel sounds present, no hepatosplenomegaly.  CNS: Alert, oriented to place and time.  No motor or  sensory deficit.  Cranial nerves intact.  Musculoskeletal: No deformity of the back or spine.  Extremities:  No edema.  Pulses equal on both sides.    Lab Results (last 24 hours)     Procedure Component Value Units Date/Time    CBC (No Diff) [[id number]]  (Normal) Collected:  09/10/20 0931    Specimen:  Blood Updated:  09/10/20 0941     WBC 8.61 10*3/mm3      RBC 4.88 10*6/mm3      Hemoglobin 16.1 g/dL      Hematocrit 45.8 %      MCV 93.9 fL      MCH 33.0 pg      MCHC 35.2 g/dL      RDW 12.3 %      RDW-SD 42.5 fl      MPV 11.1 fL      Platelets 188 10*3/mm3     Comprehensive Metabolic Panel [[id number]]  (Abnormal) Collected:  09/10/20 0931    Specimen:  Blood Updated:  09/10/20 1008     Glucose 158 mg/dL      BUN 22 mg/dL      Creatinine 0.88 mg/dL      Sodium 137 mmol/L      Potassium 3.5 mmol/L      Chloride 96 mmol/L      CO2 29.0 mmol/L      Calcium 9.4 mg/dL      Total Protein 7.0 g/dL      Albumin 4.20 g/dL      ALT (SGPT) 52 U/L      AST (SGOT) 38 U/L      Alkaline Phosphatase 81 U/L      Total Bilirubin 1.0 mg/dL      eGFR Non African Amer 89 mL/min/1.73      Globulin 2.8 gm/dL      A/G Ratio 1.5 g/dL      BUN/Creatinine Ratio 25.0     Anion Gap 12.0 mmol/L     Narrative:       GFR Normal >60  Chronic Kidney Disease <60  Kidney Failure <15      Protime-INR [[id number]]  (Normal) Collected:  09/10/20 0931    Specimen:  Blood Updated:  09/10/20 1001     Protime 14.6 Seconds      INR 1.09    Narrative:       Therapeutic range for most indications is 2.0-3.0 INR,  or 2.5-3.5 for mechanical heart valves.    POC Activated Clotting Time [[id number]]  (Abnormal) Collected:  09/10/20 2203    Specimen:  Blood Updated:  09/10/20 2207     Activated Clotting Time  183.0000 Seconds      Comment: Serial Number: [id number]:  [id number]       POC Activated Clotting Time [[id number]]  (Normal) Collected:  09/11/20 0001    Specimen:  Blood Updated:  09/11/20 0005     Activated Clotting Time  146.0000 Seconds      Comment:  Serial Number: [id number]:  [id number]       CBC & Differential [[id number]] Collected:  09/11/20 0545    Specimen:  Blood Updated:  09/11/20 0609    Narrative:       The following orders were created for panel order CBC & Differential.  Procedure                               Abnormality         Status                     ---------                               -----------         ------                     CBC Auto Differential[[id number]]        Abnormal            Final result                 Please view results for these tests on the individual orders.    Comprehensive Metabolic Panel [[id number]]  (Abnormal) Collected:  09/11/20 0545    Specimen:  Blood Updated:  09/11/20 0635     Glucose 94 mg/dL      BUN 19 mg/dL      Creatinine 0.75 mg/dL      Sodium 139 mmol/L      Potassium 4.2 mmol/L      Chloride 103 mmol/L      CO2 26.0 mmol/L      Calcium 8.3 mg/dL      Total Protein 5.6 g/dL      Albumin 3.50 g/dL      ALT (SGPT) 40 U/L      AST (SGOT) 54 U/L      Alkaline Phosphatase 67 U/L      Total Bilirubin 1.1 mg/dL      eGFR Non African Amer 107 mL/min/1.73      Globulin 2.1 gm/dL      A/G Ratio 1.7 g/dL      BUN/Creatinine Ratio 25.3     Anion Gap 10.0 mmol/L     Narrative:       GFR Normal >60  Chronic Kidney Disease <60  Kidney Failure <15      Magnesium [[id number]]  (Normal) Collected:  09/11/20 0545    Specimen:  Blood Updated:  09/11/20 0635     Magnesium 2.0 mg/dL     Phosphorus [[id number]]  (Normal) Collected:  09/11/20 0545    Specimen:  Blood Updated:  09/11/20 0642     Phosphorus 3.5 mg/dL     Lipid Panel [[id number]]  (Abnormal) Collected:  09/11/20 0545    Specimen:  Blood Updated:  09/11/20 0635     Total Cholesterol 165 mg/dL      Triglycerides 117 mg/dL      HDL Cholesterol 36 mg/dL      LDL Cholesterol  106 mg/dL      VLDL Cholesterol 23.4 mg/dL      LDL/HDL Ratio 2.93    Narrative:       Cholesterol Reference Ranges  (U.S. Department of Health and Human Services ATP III  Classifications)    Desirable          <200 mg/dL  Borderline High    200-239 mg/dL  High Risk          >240 mg/dL      Triglyceride Reference Ranges  (U.S. Department of Health and Human Services ATP III Classifications)    Normal           <150 mg/dL  Borderline High  150-199 mg/dL  High             200-499 mg/dL  Very High        >500 mg/dL    HDL Reference Ranges  (U.S. Department of Health and Human Services ATP III Classifcations)    Low     <40 mg/dl (major risk factor for CHD)  High    >60 mg/dl ('negative' risk factor for CHD)        LDL Reference Ranges  (U.S. Department of Health and Human Services ATP III Classifcations)    Optimal          <100 mg/dL  Near Optimal     100-129 mg/dL  Borderline High  130-159 mg/dL  High             160-189 mg/dL  Very High        >189 mg/dL    CBC Auto Differential [[id number]]  (Abnormal) Collected:  09/11/20 0545    Specimen:  Blood Updated:  09/11/20 0609     WBC 8.92 10*3/mm3      RBC 4.32 10*6/mm3      Hemoglobin 14.5 g/dL      Hematocrit 39.8 %      MCV 92.1 fL      MCH 33.6 pg      MCHC 36.4 g/dL      RDW 12.0 %      RDW-SD 41.1 fl      MPV 11.5 fL      Platelets 165 10*3/mm3      Neutrophil % 65.9 %      Lymphocyte % 16.5 %      Monocyte % 10.4 %      Eosinophil % 6.6 %      Basophil % 0.4 %      Immature Grans % 0.2 %      Neutrophils, Absolute 5.87 10*3/mm3      Lymphocytes, Absolute 1.47 10*3/mm3      Monocytes, Absolute 0.93 10*3/mm3      Eosinophils, Absolute 0.59 10*3/mm3      Basophils, Absolute 0.04 10*3/mm3      Immature Grans, Absolute 0.02 10*3/mm3      nRBC 0.0 /100 WBC           A/P  AS  NSTEMI  S/P PCI ANIL RCA    Pt to be transfer and evaluated by Dr Kingsley, [facility] for TAVR.(Pt desires to go to [place])  He is to make arrangements today for trtansfer.  DAPT for 1 year    Fu WITH ME IN ABOUT 1  MONTH.      This document has been electronically signed by Veronica Gonzalez MD on September 11, 2020 08:32         Part of  this note may be an electronic transcription/translation of spoken language to printed text using the Dragon Dictation System.

## 2020-09-11 NOTE — CONSULTS
Nutrition Services    Patient Name:  Mark Sharma  YOB: 1960  MRN: [id number]  Admit Date:  9/5/2020    Pt is s/p Heart Cath yesterday.  He is t o be transferred to [facility] for further tx.  Will monitor tx plans    Electronically signed by:  Izabella Mathews RD  09/11/20 09:28

## 2020-09-14 NOTE — PAYOR COMM NOTE
"Sandy Mckeon  [facility]  P :[phone]  F: [phone]        Mark Sharma (59 y.o. Male)     Date of Birth Social Security Number Address Home Phone MRN    1960  [address]  [facility] [address] [phone] [id number]    [employer] Status          None Single       Admission Date Admission Type Admitting Provider Attending Provider Department, Room/Bed    9/5/20 Emergency Madi Griffith MD  [facility] CRITICAL CARE STEPDOWN, 05/A    Discharge Date Discharge Disposition Discharge Destination        9/11/2020 Left Against Medical Advice              Attending Provider: (none)   Allergies: Metoprolol    Isolation: None   Infection: None   Code Status: Prior    Ht: 182.9 cm (72.01\")   Wt: 84.4 kg (186 lb)    Admission Cmt: None   Principal Problem: Acute respiratory failure with hypoxia (CMS/HCC) [J96.01]                 Active Insurance as of 9/5/2020     Primary Coverage     Payor Plan Insurance Group Employer/Plan Group    [id number] PPO [id number]     Payor Plan Address Payor Plan Phone Number Payor Plan Fax Number Effective Dates    [id number] [phone]  8/1/2019 - None Entered    [place] [address]       Subscriber Name Subscriber Birth Date Member ID       MARK SHARMA 1960 [id number]                 Emergency Contacts      (Rel.) Home Phone Work Phone Mobile Phone    Kristy Harris (Friend) [phone] -- [phone]    Ciaran Sharma (Brother) [phone] -- [phone]    Kamran Sharma (Son) [phone] -- --               Discharge Summary      Shira Dunn MD at 09/11/20 0945                  [facility] Medicine Services  DISCHARGE SUMMARY   LEFT AMA      Date of Admission: 9/5/2020  Date of Discharge:  9/11/2020  Primary Care Physician: Tyler Singh MD    Presenting " Problem/History of Present Illness:  Pneumonia due to organism [J18.9]  COPD exacerbation (CMS/[facility]) [J44.1]  Acute respiratory failure with hypoxia (CMS/HCC) [J96.01]       Final Discharge Diagnoses:  Active Hospital Problems    Diagnosis   • **Acute respiratory failure with hypoxia (CMS/[facility])   • Severe malnutrition (CMS/[facility])   • Aortic stenosis, severe   • Acute systolic CHF (congestive heart failure) (CMS/[facility])       Consults:   Consults     Date and Time Order Name Status Description    9/5/2020 1424 Inpatient Cardiology Consult Completed           Procedures Performed: Procedure(s):  Left Heart Cath                Pertinent Test Results:   Lab Results (most recent)     Procedure Component Value Units Date/Time    Phosphorus [[id number]]  (Normal) Collected:  09/11/20 0545    Specimen:  Blood Updated:  09/11/20 0642     Phosphorus 3.5 mg/dL     Comprehensive Metabolic Panel [[id number]]  (Abnormal) Collected:  09/11/20 0545    Specimen:  Blood Updated:  09/11/20 0635     Glucose 94 mg/dL      BUN 19 mg/dL      Creatinine 0.75 mg/dL      Sodium 139 mmol/L      Potassium 4.2 mmol/L      Chloride 103 mmol/L      CO2 26.0 mmol/L      Calcium 8.3 mg/dL      Total Protein 5.6 g/dL      Albumin 3.50 g/dL      ALT (SGPT) 40 U/L      AST (SGOT) 54 U/L      Alkaline Phosphatase 67 U/L      Total Bilirubin 1.1 mg/dL      eGFR Non African Amer 107 mL/min/1.73      Globulin 2.1 gm/dL      A/G Ratio 1.7 g/dL      BUN/Creatinine Ratio 25.3     Anion Gap 10.0 mmol/L     Narrative:       GFR Normal >60  Chronic Kidney Disease <60  Kidney Failure <15      Magnesium [[id number]]  (Normal) Collected:  09/11/20 0545    Specimen:  Blood Updated:  09/11/20 0635     Magnesium 2.0 mg/dL     Lipid Panel [[id number]]  (Abnormal) Collected:  09/11/20 0545    Specimen:  Blood Updated:  09/11/20 0635     Total Cholesterol 165 mg/dL      Triglycerides 117 mg/dL      HDL Cholesterol 36 mg/dL      LDL Cholesterol  106 mg/dL      VLDL Cholesterol 23.4  mg/dL      LDL/HDL Ratio 2.93    Narrative:       Cholesterol Reference Ranges  (U.S. Department of Health and Human Services ATP III Classifications)    Desirable          <200 mg/dL  Borderline High    200-239 mg/dL  High Risk          >240 mg/dL      Triglyceride Reference Ranges  (U.S. Department of Health and Human Services ATP III Classifications)    Normal           <150 mg/dL  Borderline High  150-199 mg/dL  High             200-499 mg/dL  Very High        >500 mg/dL    HDL Reference Ranges  (U.S. Department of Health and Human Services ATP III Classifcations)    Low     <40 mg/dl (major risk factor for CHD)  High    >60 mg/dl ('negative' risk factor for CHD)        LDL Reference Ranges  (U.S. Department of Health and Human Services ATP III Classifcations)    Optimal          <100 mg/dL  Near Optimal     100-129 mg/dL  Borderline High  130-159 mg/dL  High             160-189 mg/dL  Very High        >189 mg/dL    CBC & Differential [[id number]] Collected:  09/11/20 0545    Specimen:  Blood Updated:  09/11/20 0609    Narrative:       The following orders were created for panel order CBC & Differential.  Procedure                               Abnormality         Status                     ---------                               -----------         ------                     CBC Auto Differential[[id number]]        Abnormal            Final result                 Please view results for these tests on the individual orders.    CBC Auto Differential [[id number]]  (Abnormal) Collected:  09/11/20 0545    Specimen:  Blood Updated:  09/11/20 0609     WBC 8.92 10*3/mm3      RBC 4.32 10*6/mm3      Hemoglobin 14.5 g/dL      Hematocrit 39.8 %      MCV 92.1 fL      MCH 33.6 pg      MCHC 36.4 g/dL      RDW 12.0 %      RDW-SD 41.1 fl      MPV 11.5 fL      Platelets 165 10*3/mm3      Neutrophil % 65.9 %      Lymphocyte % 16.5 %      Monocyte % 10.4 %      Eosinophil % 6.6 %      Basophil % 0.4 %      Immature Grans % 0.2 %       Neutrophils, Absolute 5.87 10*3/mm3      Lymphocytes, Absolute 1.47 10*3/mm3      Monocytes, Absolute 0.93 10*3/mm3      Eosinophils, Absolute 0.59 10*3/mm3      Basophils, Absolute 0.04 10*3/mm3      Immature Grans, Absolute 0.02 10*3/mm3      nRBC 0.0 /100 WBC     POC Activated Clotting Time [[id number]]  (Normal) Collected:  09/11/20 0001    Specimen:  Blood Updated:  09/11/20 0005     Activated Clotting Time  146.0000 Seconds      Comment: Serial Number: [id number]:  [id number]       POC Activated Clotting Time [[id number]]  (Abnormal) Collected:  09/10/20 2203    Specimen:  Blood Updated:  09/10/20 2207     Activated Clotting Time  183.0000 Seconds      Comment: Serial Number: [id number]:  [id number]       Comprehensive Metabolic Panel [[id number]]  (Abnormal) Collected:  09/10/20 0931    Specimen:  Blood Updated:  09/10/20 1008     Glucose 158 mg/dL      BUN 22 mg/dL      Creatinine 0.88 mg/dL      Sodium 137 mmol/L      Potassium 3.5 mmol/L      Chloride 96 mmol/L      CO2 29.0 mmol/L      Calcium 9.4 mg/dL      Total Protein 7.0 g/dL      Albumin 4.20 g/dL      ALT (SGPT) 52 U/L      AST (SGOT) 38 U/L      Alkaline Phosphatase 81 U/L      Total Bilirubin 1.0 mg/dL      eGFR Non African Amer 89 mL/min/1.73      Globulin 2.8 gm/dL      A/G Ratio 1.5 g/dL      BUN/Creatinine Ratio 25.0     Anion Gap 12.0 mmol/L     Narrative:       GFR Normal >60  Chronic Kidney Disease <60  Kidney Failure <15      Protime-INR [[id number]]  (Normal) Collected:  09/10/20 0931    Specimen:  Blood Updated:  09/10/20 1001     Protime 14.6 Seconds      INR 1.09    Narrative:       Therapeutic range for most indications is 2.0-3.0 INR,  or 2.5-3.5 for mechanical heart valves.    CBC (No Diff) [[id number]]  (Normal) Collected:  09/10/20 0931    Specimen:  Blood Updated:  09/10/20 0941     WBC 8.61 10*3/mm3      RBC 4.88 10*6/mm3      Hemoglobin 16.1 g/dL      Hematocrit 45.8 %      MCV 93.9 fL      MCH 33.0 pg      MCHC 35.2  g/dL      RDW 12.3 %      RDW-SD 42.5 fl      MPV 11.1 fL      Platelets 188 10*3/mm3     Blood Culture - Blood, Arm, Left [[id number]] Collected:  09/05/20 0525    Specimen:  Blood from Arm, Left Updated:  09/10/20 0545     Blood Culture No growth at 5 days    Phosphorus [[id number]]  (Abnormal) Collected:  09/10/20 0326    Specimen:  Blood Updated:  09/10/20 0421     Phosphorus 5.1 mg/dL     Magnesium [[id number]]  (Normal) Collected:  09/10/20 0326    Specimen:  Blood Updated:  09/10/20 0417     Magnesium 2.2 mg/dL     CBC & Differential [[id number]] Collected:  09/10/20 0326    Specimen:  Blood Updated:  09/10/20 0343    Narrative:       The following orders were created for panel order CBC & Differential.  Procedure                               Abnormality         Status                     ---------                               -----------         ------                     CBC Auto Differential[[id number]]        Abnormal            Final result                 Please view results for these tests on the individual orders.    CBC Auto Differential [[id number]]  (Abnormal) Collected:  09/10/20 0326    Specimen:  Blood Updated:  09/10/20 0343     WBC 8.16 10*3/mm3      RBC 4.51 10*6/mm3      Hemoglobin 14.8 g/dL      Hematocrit 42.4 %      MCV 94.0 fL      MCH 32.8 pg      MCHC 34.9 g/dL      RDW 12.1 %      RDW-SD 42.2 fl      MPV 11.0 fL      Platelets 171 10*3/mm3      Neutrophil % 64.0 %      Lymphocyte % 19.5 %      Monocyte % 9.4 %      Eosinophil % 6.5 %      Basophil % 0.4 %      Immature Grans % 0.2 %      Neutrophils, Absolute 5.22 10*3/mm3      Lymphocytes, Absolute 1.59 10*3/mm3      Monocytes, Absolute 0.77 10*3/mm3      Eosinophils, Absolute 0.53 10*3/mm3      Basophils, Absolute 0.03 10*3/mm3      Immature Grans, Absolute 0.02 10*3/mm3      nRBC 0.0 /100 WBC     BNP [[id number]]  (Abnormal) Collected:  09/08/20 0504    Specimen:  Blood Updated:  09/08/20 1411     proBNP 4,314.0 pg/mL     Narrative:         Among patients with dyspnea, NT-proBNP is highly sensitive for the detection of acute congestive heart failure. In addition NT-proBNP of <300 pg/ml effectively rules out acute congestive heart failure with 99% negative predictive value.    Results may be falsely decreased if patient taking Biotin.      Blood Gas, Arterial [[id number]]  (Abnormal) Collected:  09/08/20 1030    Specimen:  Arterial Blood Updated:  09/08/20 1055     Site Right Radial     Lenny's Test N/A     pH, Arterial 7.473 pH units      Comment: 83 Value above reference range        pCO2, Arterial 36.4 mm Hg      pO2, Arterial 75.1 mm Hg      Comment: 84 Value below reference range        HCO3, Arterial 26.6 mmol/L      Comment: 83 Value above reference range        Base Excess, Arterial 3.1 mmol/L      Comment: 83 Value above reference range        O2 Saturation, Arterial 96.0 %      Barometric Pressure for Blood Gas 748 mmHg      Modality Room Air     Flow Rate 2.0 lpm      Ventilator Mode NA     Collected by RT     Comment: Meter: [id number]     :  [id number]       Blood Gas, Arterial [[id number]]  (Abnormal) Collected:  09/08/20 0646    Specimen:  Arterial Blood Updated:  09/08/20 0658     Site Right Radial     Lenny's Test Positive     pH, Arterial 7.478 pH units      Comment: 83 Value above reference range        pCO2, Arterial 32.8 mm Hg      Comment: 84 Value below reference range        pO2, Arterial 104.0 mm Hg      HCO3, Arterial 24.3 mmol/L      Base Excess, Arterial 1.4 mmol/L      O2 Saturation, Arterial 98.8 %      Barometric Pressure for Blood Gas 747 mmHg      Modality Ventilator     FIO2 35 %      Ventilator Mode PSV     PEEP 5.0     PSV 8.0 cmH2O      Collected by RT     Comment: Meter: [id number]     :  [id number]       Blood Culture - Blood, Hand, Right [[id number]]  (Abnormal) Collected:  09/06/20 0545    Specimen:  Blood from Hand, Right Updated:  09/08/20 0519     Blood Culture Staphylococcus, coagulase  negative     Comment: Probable contaminant requires clinical correlation, susceptibility not performed unless requested by physician.          Isolated from Aerobic Bottle     Gram Stain Aerobic Bottle Gram positive cocci in clusters     Comment: 1 positive for gram positive cocci of 4 drawn       Blood Culture ID, PCR - Blood, Hand, Right [[id number]]  (Abnormal) Collected:  09/06/20 0545    Specimen:  Blood from Hand, Right Updated:  09/07/20 0936     BCID, PCR Staphylococcus species, not aureus. mecA (methicillin resistance gene) detected. Identification by BCID PCR.    Respiratory Culture - Sputum, NT Suction [[id number]] Collected:  09/05/20 1610    Specimen:  Sputum from NT Suction Updated:  09/07/20 0651     Respiratory Culture Rare Normal Respiratory Herlinda: NO S.aureus/MRSA or Pseudomonas aeruginosa     Gram Stain Moderate (3+) WBCs per low power field      Rare (1+) Epithelial cells per low power field      Mixed bacterial herlinda    Troponin [[id number]]  (Abnormal) Collected:  09/06/20 0219    Specimen:  Blood Updated:  09/06/20 0337     Troponin T 0.237 ng/mL     Narrative:       Troponin T Reference Range:  <= 0.03 ng/mL-   Negative for AMI  >0.03 ng/mL-     Abnormal for myocardial necrosis.  Clinicians would have to utilize clinical acumen, EKG, Troponin and serial changes to determine if it is an Acute Myocardial Infarction or myocardial injury due to an underlying chronic condition.       Results may be falsely decreased if patient taking Biotin.      Urinalysis With Culture If Indicated - Urine, Catheter [[id number]]  (Abnormal) Collected:  09/05/20 1553    Specimen:  Urine, Catheter Updated:  09/05/20 1600     Color, UA Yellow     Appearance, UA Clear     pH, UA 5.5     Specific Gravity, UA 1.020     Glucose, UA Negative     Ketones, UA Trace     Bilirubin, UA Negative     Blood, UA Moderate (2+)     Protein, UA Negative     Leuk Esterase, UA Negative     Nitrite, UA Negative     Urobilinogen, UA 0.2  E.U./dL    Urinalysis, Microscopic Only - Urine, Catheter [[id number]]  (Abnormal) Collected:  09/05/20 1553    Specimen:  Urine, Catheter Updated:  09/05/20 1600     RBC, UA 21-30 /HPF      WBC, UA 0-2 /HPF      Bacteria, UA None Seen /HPF      Squamous Epithelial Cells, UA None Seen /HPF      Hyaline Casts, UA None Seen /LPF      Methodology Automated Microscopy    Troponin [[id number]]  (Abnormal) Collected:  09/05/20 1335    Specimen:  Blood Updated:  09/05/20 1416     Troponin T 0.221 ng/mL     Narrative:       Troponin T Reference Range:  <= 0.03 ng/mL-   Negative for AMI  >0.03 ng/mL-     Abnormal for myocardial necrosis.  Clinicians would have to utilize clinical acumen, EKG, Troponin and serial changes to determine if it is an Acute Myocardial Infarction or myocardial injury due to an underlying chronic condition.       Results may be falsely decreased if patient taking Biotin.      Extra Tubes [[id number]] Collected:  09/05/20 0932    Specimen:  Blood, Venous Line Updated:  09/05/20 1045    Narrative:       The following orders were created for panel order Extra Tubes.  Procedure                               Abnormality         Status                     ---------                               -----------         ------                     Lavender Top[[id number]]                                     Final result                 Please view results for these tests on the individual orders.    Lavender Top [[id number]] Collected:  09/05/20 0932    Specimen:  Blood Updated:  09/05/20 1045     Extra Tube hold for add-on     Comment: Auto resulted       Lactic Acid, Reflex [[id number]]  (Normal) Collected:  09/05/20 0932    Specimen:  Blood Updated:  09/05/20 1007     Lactate 1.0 mmol/L     COVID PRE-OP / PRE-PROCEDURE SCREENING ORDER (NO ISOLATION) - Swab, Nasopharynx [[id number]] Collected:  09/05/20 0640    Specimen:  Swab from Nasopharynx Updated:  09/05/20 1003    Narrative:       The following orders were  created for panel order COVID PRE-OP / PRE-PROCEDURE SCREENING ORDER (NO ISOLATION) - Swab, Nasopharynx.  Procedure                               Abnormality         Status                     ---------                               -----------         ------                     COVID-19, [facility] IN-HOUS...[[id number]]  Normal              Final result                 Please view results for these tests on the individual orders.    COVID-19, [facility] IN-HOUSE, NP SWAB IN TRANSPORT MEDIA 8-10 HR TAT - Swab, Nasopharynx [[id number]]  (Normal) Collected:  09/05/20 0640    Specimen:  Swab from Nasopharynx Updated:  09/05/20 1003     COVID19 Not Detected    Narrative:       Testing performed by Real Time RT-PCR  This test has not been approved by the [facility] but is authorized under the Emergency Use Act (EUA)    Fact sheet for providers: https://www.fda.gov/media/167988/download    Fact sheet for patients: https://www.fda.gov/media/230233/download        Lactic Acid, Reflex Timer (This will reflex a repeat order 3-3:15 hours after ordered.) [[id number]] Collected:  09/05/20 0525    Specimen:  Blood Updated:  09/05/20 0915     Hold Tube Hold for add-ons.     Comment: Auto resulted.       Hemoglobin A1c [[id number]]  (Normal) Collected:  09/05/20 0438    Specimen:  Blood Updated:  09/05/20 0735     Hemoglobin A1C 5.30 %     Narrative:       Hemoglobin A1C Ranges:    Increased Risk for Diabetes  5.7% to 6.4%  Diabetes                     >= 6.5%  Diabetic Goal                < 7.0%    Lactic Acid, Plasma [[id number]]  (Abnormal) Collected:  09/05/20 0525    Specimen:  Blood Updated:  09/05/20 0604     Lactate 2.2 mmol/L     [place] Draw [[id number]] Collected:  09/05/20 0437    Specimen:  Blood Updated:  09/05/20 0545    Narrative:       The following orders were created for panel order [place] Draw.  Procedure                               Abnormality         Status                     ---------                                -----------         ------                     Light Blue Top[[id number]]                                   Final result               Green Top (Gel)[[id number]]                                  Final result               Lavender Top[[id number]]                                     Final result               Gold Top - SST[[id number]]                                   Final result                 Please view results for these tests on the individual orders.    Light Blue Top [[id number]] Collected:  09/05/20 0437    Specimen:  Blood Updated:  09/05/20 0545     Extra Tube hold for add-on     Comment: Auto resulted       Green Top (Gel) [[id number]] Collected:  09/05/20 0437    Specimen:  Blood Updated:  09/05/20 0545     Extra Tube Hold for add-ons.     Comment: Auto resulted.       Lavender Top [[id number]] Collected:  09/05/20 0438    Specimen:  Blood Updated:  09/05/20 0545     Extra Tube hold for add-on     Comment: Auto resulted       Gold Top - SST [[id number]] Collected:  09/05/20 0437    Specimen:  Blood Updated:  09/05/20 0545     Extra Tube Hold for add-ons.     Comment: Auto resulted.       BNP [[id number]]  (Abnormal) Collected:  09/05/20 0437    Specimen:  Blood Updated:  09/05/20 0516     proBNP 3,740.0 pg/mL     Narrative:       Among patients with dyspnea, NT-proBNP is highly sensitive for the detection of acute congestive heart failure. In addition NT-proBNP of <300 pg/ml effectively rules out acute congestive heart failure with 99% negative predictive value.    Results may be falsely decreased if patient taking Biotin.          Imaging Results (Most Recent)     Procedure Component Value Units Date/Time    XR Chest 1 View [[id number]] Collected:  09/08/20 0431     Updated:  09/08/20 0538    Narrative:         CHEST X-RAY 1 VIEW on 9/8/2020     CLINICAL INDICATION: Follow-up CHF    COMPARISON: 9/5/2020    FINDINGS: ET tube tip is in the mid thoracic trachea. Feeding  tube extends below the diaphragm and below  the level of this  film. There are small left greater than right pleural effusions.  There has been improvement in bilateral opacities consistent with  improved edema and/or pneumonia. Heart is upper limits normal for  size. Hilar and mediastinal contours are within normal limits.      Impression:       Findings most consistent with improving changes of  CHF.    Electronically signed by:  Magdiel López  9/8/2020 5:37 AM CDT  Workstation: [id number]    XR Abdomen KUB [[id number]] Collected:  09/07/20 1605     Updated:  09/07/20 1633    Narrative:       PROCEDURE: XR ABDOMEN KUB    VIEWS:  1    INDICATION:Coretrak placement    COMPARISON: None    FINDINGS:      - Bowel gas pattern: Nonobstructive    - Free air: None    - Soft tissue:No gross evidence of organomegaly,      an abdominal mass,or ascites    - Calculi:None projecting over gallbladder       fossa, renal shadows, or anticipated course of the ureters.    - Osseous:Limited assessment, unremarkable for age.    - Misc: Coretrak catheter is present with tip just right of  midline, probably within the distal stomach        Impression:       Coretrak catheter terminates just right of midline, an expected  location of the distal stomach.        Electronically signed by:  Delmy Weiss MD  9/7/2020 4:32 PM CDT  Workstation: [id number]    XR Chest 1 View [[id number]] Collected:  09/05/20 0502     Updated:  09/05/20 0533    Narrative:       PORTABLE CHEST X-RAY    CLINICAL HISTORY: respiratory distress    COMPARISON: None.    FINDINGS: Portable AP view of the chest was obtained with  overlying monitor leads in place. ET tube terminates at the level  of the mid thoracic trachea. Nasogastric tube extends below the  diaphragm but the tip is not imaged. There are diffuse  groundglass and interstitial opacities likely severe edema.  Superimposed pneumonia cannot be entirely excluded. There are  small bilateral effusions. Borderline cardiomegaly.      Impression:        "Extensive pulmonary opacities likely edema and  effusions      Electronically signed by:  Wally Orona MD  9/5/2020 5:32 AM CDT  Workstation: [id number]          Chief Complaint on Day of Discharge: left AMA, was not seen day of discharge     Hospital Course:  HPI copied from Dr. Osborn \"Briefly this is a 59-year-old gentleman who presented the ED carrying the following problem list     1.  Hypertension  2.  History of BPH  3.  History of appendectomy  4.  History of tobacco use predominately a cigar exposure no history of cigarette use     Patient presents with a week history of progressive shortness of breath.  Of note patient was seen by his PCP on 8/22 complaining of 2-week history of epigastric pain.  Regardless patient brought in with increased respiratory rate and oxygen urgently intubated.  History is obtained from ED physician.  Patient denied any history of fevers, chills cough.  Denies any viral type symptomatology.  Patient's chest x-ray with bilateral process.  His white count was mildly elevated he had no fever although the patient post intubation is requiring vasopressors.  Of note he does have some EKG changes inferiorly bilaterally with initial troponin normal\"    He was placed on a ventilator for shortness of air. He received IV diuresis for volume overload. Echo was done and showed severe aortic stenosis. He was treated for pneumonia. He was extubated. He had cardiac cath done with PCI AINL RCA and was recommended for transfer to be evaluated by Dr. Kingsley at [facility] for TAVR. Unfortunately, he left AMA on 9/11 prior to being transferred. I sent in medications to pharmacy given his recent stent placement. [facility] said [facility] would reach out to patient and make a plan for TAVR.           Physical Exam on Discharge:  He left AMA prior to being seen.       Left Against Medical Advice    Discharge Medications:     Discharge Medications      "   New Medications      Instructions Start Date   aspirin 81 MG EC tablet   81 mg, Oral, Daily   Start Date:  September 12, 2020     atorvastatin 40 MG tablet  Commonly known as:  LIPITOR   40 mg, Oral, Daily      bisoprolol 5 MG tablet  Commonly known as:  ZEBeta   2.5 mg, Oral, Every 24 Hours Scheduled   Start Date:  September 12, 2020     clopidogrel 75 MG tablet  Commonly known as:  Plavix   75 mg, Oral, Daily      furosemide 40 MG tablet  Commonly known as:  LASIX   40 mg, Oral, Daily      potassium chloride ER 20 MEQ tablet controlled-release ER tablet  Commonly known as:  K-TAB   20 mEq, Oral, Daily         Continue These Medications      Instructions Start Date   MULTIVITAL PO   1 tablet/day, Oral, Daily      pantoprazole 40 MG EC tablet  Commonly known as:  PROTONIX   40 mg, Oral, Daily         Stop These Medications    amLODIPine 10 MG tablet  Commonly known as:  NORVASC     irbesartan-hydrochlorothiazide 300-12.5 MG tablet  Commonly known as:  AVALIDE     naproxen sodium 220 MG tablet  Commonly known as:  ALEVE            Discharge Diet: left AMA    Activity at Discharge: left AMA    Discharge Care Plan/Instructions: left AMA    Follow-up Appointments:   No future appointments.    Test Results Pending at Discharge:   Time: 31 min          This document has been electronically signed by Shira Dunn MD on September 11, 2020 16:49              Electronically signed by Shira Dunn MD at 09/11/20 3699       Discharge Order (From admission, onward)    None

## 2022-02-03 NOTE — SIGNIFICANT NOTE
09/09/20 1405   Rehab Treatment   Discipline physical therapy assistant   Reason Treatment Not Performed unavailable for treatment  (pt getting a breathing Tx @ present time)      Additional Notes: Patient admits to not being consistent with medication Render Risk Assessment In Note?: no Detail Level: Zone

## (undated) DEVICE — PINNACLE INTRODUCER SHEATH: Brand: PINNACLE

## (undated) DEVICE — COPILOT KIT INCLUDES BLEEDBACK CONTROL VALVE / GUIDE WIRE INTRODUCER / TORQUE DEVICE: Brand: ACCESSORIES

## (undated) DEVICE — TRAP SXN POLYP QUICKCATCH LF

## (undated) DEVICE — GW PERIPH GUIDERIGHT STD/J/TP PTFE/PCOAT SS 0.038IN 5X150CM

## (undated) DEVICE — DEV INFL MONARCH 20ML

## (undated) DEVICE — ELECTRODE,RT,STRESS,FOAM,50PK: Brand: MEDLINE

## (undated) DEVICE — SKIN PREP TRAY W/CHG: Brand: MEDLINE INDUSTRIES, INC.

## (undated) DEVICE — Device: Brand: DISPOSABLE ELECTROSURGICAL SNARE

## (undated) DEVICE — MODEL BT2000 P/N 700287-012KIT CONTENTS: MANIFOLD WITH SALINE AND CONTRAST PORTS, SALINE TUBING WITH SPIKE AND HAND SYRINGE, TRANSDUCER: Brand: BT2000 AUTOMATED MANIFOLD KIT

## (undated) DEVICE — CATH GUIDE LAUNCHER JR4.0 6F 100CM

## (undated) DEVICE — ARTERIAL NEEDLE: Brand: UNBRANDED

## (undated) DEVICE — CATH DIAG IMPULSE M/ PK ANGL 6FR

## (undated) DEVICE — SINGLE-USE BIOPSY FORCEPS: Brand: RADIAL JAW 4

## (undated) DEVICE — MODEL AT P65, P/N 701554-001KIT CONTENTS: HAND CONTROLLER, 3-WAY HIGH-PRESSURE STOPCOCK WITH ROTATING END AND PREMIUM HIGH-PRESSURE TUBING: Brand: ANGIOTOUCH® KIT

## (undated) DEVICE — Device

## (undated) DEVICE — CANN SMPL SOFTECH BIFLO ETCO2 A/M 7FT